# Patient Record
Sex: MALE | Race: WHITE | Employment: UNEMPLOYED
[De-identification: names, ages, dates, MRNs, and addresses within clinical notes are randomized per-mention and may not be internally consistent; named-entity substitution may affect disease eponyms.]

---

## 2017-02-03 DIAGNOSIS — M19.90 OSTEOARTHRITIS, UNSPECIFIED OSTEOARTHRITIS TYPE, UNSPECIFIED SITE: ICD-10-CM

## 2017-02-03 RX ORDER — GUAIFENESIN 600 MG/1
600 TABLET, EXTENDED RELEASE ORAL 2 TIMES DAILY PRN
Qty: 60 TABLET | Refills: 0 | Status: SHIPPED | OUTPATIENT
Start: 2017-02-03 | End: 2017-09-08 | Stop reason: SDUPTHER

## 2017-02-03 RX ORDER — AZITHROMYCIN 250 MG/1
TABLET, FILM COATED ORAL
Qty: 6 TABLET | Refills: 0 | Status: SHIPPED | OUTPATIENT
Start: 2017-02-03 | End: 2017-02-13

## 2017-02-03 RX ORDER — IBUPROFEN 800 MG/1
TABLET ORAL
Qty: 90 TABLET | Refills: 1 | Status: SHIPPED | OUTPATIENT
Start: 2017-02-03 | End: 2017-09-08 | Stop reason: SDUPTHER

## 2017-02-05 DIAGNOSIS — I10 ESSENTIAL HYPERTENSION: ICD-10-CM

## 2017-02-06 RX ORDER — LISINOPRIL 20 MG/1
TABLET ORAL
Qty: 30 TABLET | Refills: 5 | Status: SHIPPED | OUTPATIENT
Start: 2017-02-06 | End: 2017-02-28 | Stop reason: SDUPTHER

## 2017-02-28 ENCOUNTER — OFFICE VISIT (OUTPATIENT)
Dept: FAMILY MEDICINE CLINIC | Facility: CLINIC | Age: 57
End: 2017-02-28

## 2017-02-28 VITALS
BODY MASS INDEX: 32.89 KG/M2 | WEIGHT: 217 LBS | RESPIRATION RATE: 16 BRPM | HEIGHT: 68 IN | HEART RATE: 76 BPM | OXYGEN SATURATION: 90 % | SYSTOLIC BLOOD PRESSURE: 160 MMHG | DIASTOLIC BLOOD PRESSURE: 92 MMHG

## 2017-02-28 DIAGNOSIS — M62.08 DIASTASIS RECTI: ICD-10-CM

## 2017-02-28 DIAGNOSIS — K21.9 REFLUX LARYNGITIS: ICD-10-CM

## 2017-02-28 DIAGNOSIS — J44.9 CHRONIC OBSTRUCTIVE PULMONARY DISEASE, UNSPECIFIED COPD TYPE (HCC): ICD-10-CM

## 2017-02-28 DIAGNOSIS — J04.0 REFLUX LARYNGITIS: ICD-10-CM

## 2017-02-28 DIAGNOSIS — I10 ESSENTIAL HYPERTENSION: Primary | ICD-10-CM

## 2017-02-28 PROCEDURE — 99214 OFFICE O/P EST MOD 30 MIN: CPT | Performed by: FAMILY MEDICINE

## 2017-02-28 RX ORDER — LISINOPRIL 30 MG/1
30 TABLET ORAL DAILY
Qty: 30 TABLET | Refills: 5 | Status: SHIPPED | OUTPATIENT
Start: 2017-02-28 | End: 2017-06-02 | Stop reason: SDUPTHER

## 2017-02-28 ASSESSMENT — PATIENT HEALTH QUESTIONNAIRE - PHQ9
SUM OF ALL RESPONSES TO PHQ9 QUESTIONS 1 & 2: 0
1. LITTLE INTEREST OR PLEASURE IN DOING THINGS: 0
2. FEELING DOWN, DEPRESSED OR HOPELESS: 0
SUM OF ALL RESPONSES TO PHQ QUESTIONS 1-9: 0

## 2017-03-03 ENCOUNTER — OFFICE VISIT (OUTPATIENT)
Dept: PULMONOLOGY | Facility: CLINIC | Age: 57
End: 2017-03-03

## 2017-03-03 VITALS
OXYGEN SATURATION: 89 % | WEIGHT: 223.8 LBS | RESPIRATION RATE: 16 BRPM | HEIGHT: 68 IN | SYSTOLIC BLOOD PRESSURE: 155 MMHG | DIASTOLIC BLOOD PRESSURE: 102 MMHG | HEART RATE: 88 BPM | BODY MASS INDEX: 33.92 KG/M2

## 2017-03-03 DIAGNOSIS — G47.33 OSA ON CPAP: ICD-10-CM

## 2017-03-03 DIAGNOSIS — G47.33 CHRONIC INTERMITTENT HYPOXIA WITH OBSTRUCTIVE SLEEP APNEA: ICD-10-CM

## 2017-03-03 DIAGNOSIS — G47.34 CHRONIC INTERMITTENT HYPOXIA WITH OBSTRUCTIVE SLEEP APNEA: ICD-10-CM

## 2017-03-03 DIAGNOSIS — Z99.89 OSA ON CPAP: ICD-10-CM

## 2017-03-03 DIAGNOSIS — Z87.891 STOPPED SMOKING WITH GREATER THAN 40 PACK YEAR HISTORY: ICD-10-CM

## 2017-03-03 DIAGNOSIS — C32.9 LARYNGEAL CANCER (HCC): ICD-10-CM

## 2017-03-03 DIAGNOSIS — J44.9 MODERATE COPD (CHRONIC OBSTRUCTIVE PULMONARY DISEASE) (HCC): Primary | ICD-10-CM

## 2017-03-03 PROCEDURE — 99213 OFFICE O/P EST LOW 20 MIN: CPT | Performed by: INTERNAL MEDICINE

## 2017-03-03 ASSESSMENT — ENCOUNTER SYMPTOMS
BACK PAIN: 1
SHORTNESS OF BREATH: 1
WHEEZING: 1
COUGH: 1
EYES NEGATIVE: 1
APNEA: 1

## 2017-05-08 PROBLEM — K52.9 COLITIS: Status: ACTIVE | Noted: 2017-05-08

## 2017-05-24 ENCOUNTER — TELEPHONE (OUTPATIENT)
Dept: CASE MANAGEMENT | Age: 57
End: 2017-05-24

## 2017-05-30 ENCOUNTER — TELEPHONE (OUTPATIENT)
Dept: CASE MANAGEMENT | Age: 57
End: 2017-05-30

## 2017-06-02 ENCOUNTER — OFFICE VISIT (OUTPATIENT)
Dept: FAMILY MEDICINE CLINIC | Age: 57
End: 2017-06-02
Payer: COMMERCIAL

## 2017-06-02 VITALS
DIASTOLIC BLOOD PRESSURE: 94 MMHG | HEART RATE: 72 BPM | WEIGHT: 217.2 LBS | RESPIRATION RATE: 16 BRPM | SYSTOLIC BLOOD PRESSURE: 153 MMHG | BODY MASS INDEX: 33.03 KG/M2

## 2017-06-02 DIAGNOSIS — J04.0 REFLUX LARYNGITIS: ICD-10-CM

## 2017-06-02 DIAGNOSIS — I10 ESSENTIAL HYPERTENSION: Primary | ICD-10-CM

## 2017-06-02 DIAGNOSIS — K52.9 COLITIS: ICD-10-CM

## 2017-06-02 DIAGNOSIS — K21.9 REFLUX LARYNGITIS: ICD-10-CM

## 2017-06-02 DIAGNOSIS — K76.0 HEPATIC STEATOSIS: ICD-10-CM

## 2017-06-02 PROCEDURE — 99214 OFFICE O/P EST MOD 30 MIN: CPT | Performed by: FAMILY MEDICINE

## 2017-06-02 RX ORDER — LISINOPRIL 20 MG/1
20 TABLET ORAL DAILY
Qty: 30 TABLET | Refills: 2 | Status: SHIPPED | OUTPATIENT
Start: 2017-06-02 | End: 2017-09-08 | Stop reason: SDUPTHER

## 2017-06-07 ENCOUNTER — HOSPITAL ENCOUNTER (OUTPATIENT)
Dept: CT IMAGING | Age: 57
Discharge: HOME | End: 2017-06-07
Payer: COMMERCIAL

## 2017-06-07 DIAGNOSIS — Z87.891 PERSONAL HISTORY OF TOBACCO USE: ICD-10-CM

## 2017-06-07 PROCEDURE — G0297 LDCT FOR LUNG CA SCREEN: HCPCS

## 2017-06-21 ENCOUNTER — HOSPITAL ENCOUNTER (OUTPATIENT)
Dept: RADIATION ONCOLOGY | Facility: MEDICAL CENTER | Age: 57
Discharge: HOME | End: 2017-06-21
Payer: COMMERCIAL

## 2017-06-21 PROCEDURE — 99212 OFFICE O/P EST SF 10 MIN: CPT | Performed by: RADIOLOGY

## 2017-08-11 ENCOUNTER — ANESTHESIA (OUTPATIENT)
Dept: OPERATING ROOM | Facility: CLINIC | Age: 57
End: 2017-08-11
Payer: COMMERCIAL

## 2017-08-11 ENCOUNTER — HOSPITAL ENCOUNTER (OUTPATIENT)
Facility: CLINIC | Age: 57
Setting detail: OUTPATIENT SURGERY
Discharge: HOME | End: 2017-08-11
Attending: SURGERY | Admitting: SURGERY
Payer: COMMERCIAL

## 2017-08-11 ENCOUNTER — ANESTHESIA EVENT (OUTPATIENT)
Dept: OPERATING ROOM | Facility: CLINIC | Age: 57
End: 2017-08-11
Payer: COMMERCIAL

## 2017-08-11 VITALS
OXYGEN SATURATION: 91 % | HEART RATE: 60 BPM | HEIGHT: 68 IN | BODY MASS INDEX: 32.89 KG/M2 | DIASTOLIC BLOOD PRESSURE: 72 MMHG | RESPIRATION RATE: 15 BRPM | SYSTOLIC BLOOD PRESSURE: 100 MMHG | WEIGHT: 217 LBS | TEMPERATURE: 96.8 F

## 2017-08-11 VITALS
RESPIRATION RATE: 19 BRPM | DIASTOLIC BLOOD PRESSURE: 51 MMHG | OXYGEN SATURATION: 85 % | SYSTOLIC BLOOD PRESSURE: 75 MMHG

## 2017-08-11 PROBLEM — Z98.890 S/P COLONOSCOPY WITH POLYPECTOMY: Status: ACTIVE | Noted: 2017-08-11

## 2017-08-11 PROBLEM — K62.1 RECTAL POLYP: Status: ACTIVE | Noted: 2017-08-11

## 2017-08-11 PROCEDURE — 2580000003 HC RX 258: Performed by: ANESTHESIOLOGY

## 2017-08-11 PROCEDURE — 2580000003 HC RX 258: Performed by: SPECIALIST

## 2017-08-11 PROCEDURE — 3700000001 HC ADD 15 MINUTES (ANESTHESIA): Performed by: SURGERY

## 2017-08-11 PROCEDURE — 88305 TISSUE EXAM BY PATHOLOGIST: CPT

## 2017-08-11 PROCEDURE — 7100000011 HC PHASE II RECOVERY - ADDTL 15 MIN: Performed by: SURGERY

## 2017-08-11 PROCEDURE — 7100000000 HC PACU RECOVERY - FIRST 15 MIN: Performed by: SURGERY

## 2017-08-11 PROCEDURE — 3700000000 HC ANESTHESIA ATTENDED CARE: Performed by: SURGERY

## 2017-08-11 PROCEDURE — 7100000010 HC PHASE II RECOVERY - FIRST 15 MIN: Performed by: SURGERY

## 2017-08-11 PROCEDURE — 6360000002 HC RX W HCPCS: Performed by: SPECIALIST

## 2017-08-11 PROCEDURE — 6360000002 HC RX W HCPCS: Performed by: ANESTHESIOLOGY

## 2017-08-11 PROCEDURE — 3609010400 HC COLONOSCOPY POLYPECTOMY HOT BIOPSY: Performed by: SURGERY

## 2017-08-11 RX ORDER — ALBUTEROL SULFATE 2.5 MG/3ML
2.5 SOLUTION RESPIRATORY (INHALATION) EVERY 6 HOURS PRN
Status: DISCONTINUED | OUTPATIENT
Start: 2017-08-11 | End: 2017-08-11 | Stop reason: HOSPADM

## 2017-08-11 RX ORDER — PROPOFOL 10 MG/ML
INJECTION, EMULSION INTRAVENOUS CONTINUOUS PRN
Status: DISCONTINUED | OUTPATIENT
Start: 2017-08-11 | End: 2017-08-11 | Stop reason: SDUPTHER

## 2017-08-11 RX ORDER — PROPOFOL 10 MG/ML
INJECTION, EMULSION INTRAVENOUS PRN
Status: DISCONTINUED | OUTPATIENT
Start: 2017-08-11 | End: 2017-08-11 | Stop reason: SDUPTHER

## 2017-08-11 RX ORDER — SODIUM CHLORIDE, SODIUM LACTATE, POTASSIUM CHLORIDE, CALCIUM CHLORIDE 600; 310; 30; 20 MG/100ML; MG/100ML; MG/100ML; MG/100ML
INJECTION, SOLUTION INTRAVENOUS CONTINUOUS
Status: DISCONTINUED | OUTPATIENT
Start: 2017-08-11 | End: 2017-08-11 | Stop reason: HOSPADM

## 2017-08-11 RX ORDER — ALBUTEROL SULFATE 2.5 MG/3ML
2.5 SOLUTION RESPIRATORY (INHALATION) ONCE
Status: COMPLETED | OUTPATIENT
Start: 2017-08-11 | End: 2017-08-11

## 2017-08-11 RX ORDER — SODIUM CHLORIDE, SODIUM LACTATE, POTASSIUM CHLORIDE, CALCIUM CHLORIDE 600; 310; 30; 20 MG/100ML; MG/100ML; MG/100ML; MG/100ML
INJECTION, SOLUTION INTRAVENOUS CONTINUOUS PRN
Status: DISCONTINUED | OUTPATIENT
Start: 2017-08-11 | End: 2017-08-11 | Stop reason: SDUPTHER

## 2017-08-11 RX ORDER — SODIUM CHLORIDE, SODIUM LACTATE, POTASSIUM CHLORIDE, CALCIUM CHLORIDE 600; 310; 30; 20 MG/100ML; MG/100ML; MG/100ML; MG/100ML
INJECTION, SOLUTION INTRAVENOUS CONTINUOUS
Status: CANCELLED | OUTPATIENT
Start: 2017-08-11

## 2017-08-11 RX ORDER — LIDOCAINE HYDROCHLORIDE 10 MG/ML
1 INJECTION, SOLUTION EPIDURAL; INFILTRATION; INTRACAUDAL; PERINEURAL
Status: CANCELLED | OUTPATIENT
Start: 2017-08-11 | End: 2017-08-11

## 2017-08-11 RX ADMIN — SODIUM CHLORIDE, POTASSIUM CHLORIDE, SODIUM LACTATE AND CALCIUM CHLORIDE: 600; 310; 30; 20 INJECTION, SOLUTION INTRAVENOUS at 08:00

## 2017-08-11 RX ADMIN — PROPOFOL 50 MG: 10 INJECTION, EMULSION INTRAVENOUS at 08:04

## 2017-08-11 RX ADMIN — PROPOFOL 20 MG: 10 INJECTION, EMULSION INTRAVENOUS at 08:07

## 2017-08-11 RX ADMIN — SODIUM CHLORIDE, POTASSIUM CHLORIDE, SODIUM LACTATE AND CALCIUM CHLORIDE: 600; 310; 30; 20 INJECTION, SOLUTION INTRAVENOUS at 07:29

## 2017-08-11 RX ADMIN — ALBUTEROL SULFATE 2.5 MG: 2.5 SOLUTION RESPIRATORY (INHALATION) at 07:15

## 2017-08-11 RX ADMIN — PROPOFOL 120 MCG/KG/MIN: 10 INJECTION, EMULSION INTRAVENOUS at 08:04

## 2017-08-11 ASSESSMENT — COPD QUESTIONNAIRES: CAT_SEVERITY: SEVERE

## 2017-08-11 ASSESSMENT — ENCOUNTER SYMPTOMS: SHORTNESS OF BREATH: 1

## 2017-08-11 ASSESSMENT — PAIN - FUNCTIONAL ASSESSMENT: PAIN_FUNCTIONAL_ASSESSMENT: 0-10

## 2017-08-15 PROBLEM — D36.9 ADENOMATOUS POLYP: Status: ACTIVE | Noted: 2017-08-15

## 2017-08-15 LAB — SURGICAL PATHOLOGY REPORT: NORMAL

## 2017-09-08 ENCOUNTER — OFFICE VISIT (OUTPATIENT)
Dept: FAMILY MEDICINE CLINIC | Age: 57
End: 2017-09-08
Payer: COMMERCIAL

## 2017-09-08 VITALS
SYSTOLIC BLOOD PRESSURE: 136 MMHG | RESPIRATION RATE: 16 BRPM | BODY MASS INDEX: 33.45 KG/M2 | DIASTOLIC BLOOD PRESSURE: 89 MMHG | HEART RATE: 68 BPM | WEIGHT: 220 LBS

## 2017-09-08 DIAGNOSIS — E78.00 HIGH CHOLESTEROL: ICD-10-CM

## 2017-09-08 DIAGNOSIS — I10 ESSENTIAL HYPERTENSION: Primary | ICD-10-CM

## 2017-09-08 DIAGNOSIS — K21.9 REFLUX LARYNGITIS: ICD-10-CM

## 2017-09-08 DIAGNOSIS — D36.9 ADENOMATOUS POLYP: ICD-10-CM

## 2017-09-08 DIAGNOSIS — M19.90 OSTEOARTHRITIS, UNSPECIFIED OSTEOARTHRITIS TYPE, UNSPECIFIED SITE: ICD-10-CM

## 2017-09-08 DIAGNOSIS — R53.83 FATIGUE, UNSPECIFIED TYPE: ICD-10-CM

## 2017-09-08 DIAGNOSIS — J04.0 REFLUX LARYNGITIS: ICD-10-CM

## 2017-09-08 PROCEDURE — 99214 OFFICE O/P EST MOD 30 MIN: CPT | Performed by: FAMILY MEDICINE

## 2017-09-08 RX ORDER — LISINOPRIL 20 MG/1
20 TABLET ORAL DAILY
Qty: 30 TABLET | Refills: 2 | Status: SHIPPED | OUTPATIENT
Start: 2017-09-08 | End: 2017-09-14 | Stop reason: SDUPTHER

## 2017-09-08 RX ORDER — GUAIFENESIN 600 MG/1
600 TABLET, EXTENDED RELEASE ORAL 2 TIMES DAILY PRN
Qty: 60 TABLET | Refills: 0 | Status: SHIPPED | OUTPATIENT
Start: 2017-09-08 | End: 2018-07-03 | Stop reason: SDUPTHER

## 2017-09-08 RX ORDER — IBUPROFEN 800 MG/1
TABLET ORAL
Qty: 90 TABLET | Refills: 1 | Status: SHIPPED | OUTPATIENT
Start: 2017-09-08 | End: 2017-12-12 | Stop reason: SDUPTHER

## 2017-09-14 ENCOUNTER — HOSPITAL ENCOUNTER (OUTPATIENT)
Age: 57
Discharge: HOME | End: 2017-09-14
Payer: COMMERCIAL

## 2017-09-14 ENCOUNTER — TELEPHONE (OUTPATIENT)
Dept: FAMILY MEDICINE CLINIC | Age: 57
End: 2017-09-14

## 2017-09-14 DIAGNOSIS — E78.00 HIGH CHOLESTEROL: ICD-10-CM

## 2017-09-14 DIAGNOSIS — R53.83 FATIGUE, UNSPECIFIED TYPE: ICD-10-CM

## 2017-09-14 DIAGNOSIS — I10 ESSENTIAL HYPERTENSION: ICD-10-CM

## 2017-09-14 LAB
ALBUMIN SERPL-MCNC: 4.4 G/DL (ref 3.5–5.2)
ALBUMIN/GLOBULIN RATIO: ABNORMAL (ref 1–2.5)
ALP BLD-CCNC: 83 U/L (ref 40–129)
ALT SERPL-CCNC: 33 U/L (ref 5–41)
ANION GAP SERPL CALCULATED.3IONS-SCNC: 12 MMOL/L (ref 9–17)
AST SERPL-CCNC: 25 U/L
BILIRUB SERPL-MCNC: 0.82 MG/DL (ref 0.3–1.2)
BUN BLDV-MCNC: 27 MG/DL (ref 6–20)
BUN/CREAT BLD: 24 (ref 9–20)
CALCIUM SERPL-MCNC: 9.6 MG/DL (ref 8.6–10.4)
CHLORIDE BLD-SCNC: 100 MMOL/L (ref 98–107)
CHOLESTEROL/HDL RATIO: 5
CHOLESTEROL: 189 MG/DL
CO2: 30 MMOL/L (ref 20–31)
CREAT SERPL-MCNC: 1.11 MG/DL (ref 0.7–1.2)
GFR AFRICAN AMERICAN: >60 ML/MIN
GFR NON-AFRICAN AMERICAN: >60 ML/MIN
GFR SERPL CREATININE-BSD FRML MDRD: ABNORMAL ML/MIN/{1.73_M2}
GFR SERPL CREATININE-BSD FRML MDRD: ABNORMAL ML/MIN/{1.73_M2}
GLUCOSE BLD-MCNC: 115 MG/DL (ref 70–99)
HCT VFR BLD CALC: 46 % (ref 41–53)
HDLC SERPL-MCNC: 38 MG/DL
HEMOGLOBIN: 15 G/DL (ref 13.5–17.5)
LDL CHOLESTEROL: 116 MG/DL (ref 0–130)
MCH RBC QN AUTO: 26.1 PG (ref 26–34)
MCHC RBC AUTO-ENTMCNC: 32.5 G/DL (ref 31–37)
MCV RBC AUTO: 80.4 FL (ref 80–100)
PDW BLD-RTO: 15.9 % (ref 11.5–14.5)
PLATELET # BLD: 182 K/UL (ref 130–400)
PMV BLD AUTO: 8.8 FL (ref 6–12)
POTASSIUM SERPL-SCNC: 4.5 MMOL/L (ref 3.7–5.3)
RBC # BLD: 5.72 M/UL (ref 4.5–5.9)
SODIUM BLD-SCNC: 142 MMOL/L (ref 135–144)
TOTAL PROTEIN: 7.4 G/DL (ref 6.4–8.3)
TRIGL SERPL-MCNC: 174 MG/DL
TSH SERPL DL<=0.05 MIU/L-ACNC: 1.81 MIU/L (ref 0.3–5)
VLDLC SERPL CALC-MCNC: ABNORMAL MG/DL (ref 1–30)
WBC # BLD: 5.7 K/UL (ref 3.5–11)

## 2017-09-14 PROCEDURE — 84443 ASSAY THYROID STIM HORMONE: CPT

## 2017-09-14 PROCEDURE — 80053 COMPREHEN METABOLIC PANEL: CPT

## 2017-09-14 PROCEDURE — 80061 LIPID PANEL: CPT

## 2017-09-14 PROCEDURE — 36415 COLL VENOUS BLD VENIPUNCTURE: CPT

## 2017-09-14 PROCEDURE — 85027 COMPLETE CBC AUTOMATED: CPT

## 2017-09-14 RX ORDER — LISINOPRIL 30 MG/1
30 TABLET ORAL DAILY
Qty: 90 TABLET | Refills: 1 | Status: SHIPPED | OUTPATIENT
Start: 2017-09-14 | End: 2017-12-12 | Stop reason: SDUPTHER

## 2017-09-15 ENCOUNTER — OFFICE VISIT (OUTPATIENT)
Dept: PULMONOLOGY | Age: 57
End: 2017-09-15
Payer: COMMERCIAL

## 2017-09-15 ENCOUNTER — TELEPHONE (OUTPATIENT)
Dept: PULMONOLOGY | Age: 57
End: 2017-09-15

## 2017-09-15 VITALS
OXYGEN SATURATION: 91 % | HEART RATE: 97 BPM | RESPIRATION RATE: 22 BRPM | SYSTOLIC BLOOD PRESSURE: 123 MMHG | HEIGHT: 71 IN | BODY MASS INDEX: 30.38 KG/M2 | DIASTOLIC BLOOD PRESSURE: 82 MMHG | WEIGHT: 217 LBS

## 2017-09-15 DIAGNOSIS — G47.33 CHRONIC INTERMITTENT HYPOXIA WITH OBSTRUCTIVE SLEEP APNEA: ICD-10-CM

## 2017-09-15 DIAGNOSIS — G47.34 CHRONIC INTERMITTENT HYPOXIA WITH OBSTRUCTIVE SLEEP APNEA: ICD-10-CM

## 2017-09-15 DIAGNOSIS — C32.9 LARYNGEAL CANCER (HCC): ICD-10-CM

## 2017-09-15 DIAGNOSIS — J44.9 MODERATE COPD (CHRONIC OBSTRUCTIVE PULMONARY DISEASE) (HCC): Primary | ICD-10-CM

## 2017-09-15 DIAGNOSIS — G47.33 OSA ON CPAP: ICD-10-CM

## 2017-09-15 DIAGNOSIS — F17.219 NICOTINE DEPENDENCE, CIGARETTES, WITH UNSPECIFIED NICOTINE-INDUCED DISORDERS: ICD-10-CM

## 2017-09-15 DIAGNOSIS — Z87.891 STOPPED SMOKING WITH GREATER THAN 40 PACK YEAR HISTORY: ICD-10-CM

## 2017-09-15 DIAGNOSIS — Z99.89 OSA ON CPAP: ICD-10-CM

## 2017-09-15 PROCEDURE — 99213 OFFICE O/P EST LOW 20 MIN: CPT | Performed by: INTERNAL MEDICINE

## 2017-09-15 PROCEDURE — G0296 VISIT TO DETERM LDCT ELIG: HCPCS | Performed by: INTERNAL MEDICINE

## 2017-09-15 RX ORDER — PANTOPRAZOLE SODIUM 40 MG/1
40 TABLET, DELAYED RELEASE ORAL DAILY
Refills: 0 | COMMUNITY
Start: 2017-09-12 | End: 2019-08-20 | Stop reason: SDUPTHER

## 2017-09-15 RX ORDER — ACETAMINOPHEN/DIPHENHYDRAMINE 500MG-25MG
TABLET ORAL
Refills: 1 | COMMUNITY
Start: 2017-09-13 | End: 2017-12-12 | Stop reason: SDUPTHER

## 2017-09-15 ASSESSMENT — ENCOUNTER SYMPTOMS
EYES NEGATIVE: 1
SHORTNESS OF BREATH: 1

## 2017-09-18 ENCOUNTER — TELEPHONE (OUTPATIENT)
Dept: CASE MANAGEMENT | Age: 57
End: 2017-09-18

## 2017-10-16 ENCOUNTER — TELEPHONE (OUTPATIENT)
Dept: FAMILY MEDICINE CLINIC | Age: 57
End: 2017-10-16

## 2017-10-16 NOTE — TELEPHONE ENCOUNTER
The patient indicated that he would like his brother to establish care in our office. He indicates that he was recently diagnosed with cancer. I indicated that I would be happy to be his primary care physician.

## 2017-12-12 ENCOUNTER — OFFICE VISIT (OUTPATIENT)
Dept: FAMILY MEDICINE CLINIC | Age: 57
End: 2017-12-12
Payer: COMMERCIAL

## 2017-12-12 ENCOUNTER — HOSPITAL ENCOUNTER (OUTPATIENT)
Age: 57
Discharge: HOME | End: 2017-12-12
Payer: COMMERCIAL

## 2017-12-12 VITALS
WEIGHT: 224 LBS | RESPIRATION RATE: 20 BRPM | DIASTOLIC BLOOD PRESSURE: 95 MMHG | HEART RATE: 76 BPM | BODY MASS INDEX: 31.24 KG/M2 | OXYGEN SATURATION: 93 % | SYSTOLIC BLOOD PRESSURE: 168 MMHG

## 2017-12-12 DIAGNOSIS — M19.90 OSTEOARTHRITIS, UNSPECIFIED OSTEOARTHRITIS TYPE, UNSPECIFIED SITE: ICD-10-CM

## 2017-12-12 DIAGNOSIS — J04.0 REFLUX LARYNGITIS: ICD-10-CM

## 2017-12-12 DIAGNOSIS — K21.9 REFLUX LARYNGITIS: ICD-10-CM

## 2017-12-12 DIAGNOSIS — I10 ESSENTIAL HYPERTENSION: Primary | ICD-10-CM

## 2017-12-12 DIAGNOSIS — I10 ESSENTIAL HYPERTENSION: ICD-10-CM

## 2017-12-12 LAB
ANION GAP SERPL CALCULATED.3IONS-SCNC: 10 MMOL/L (ref 9–17)
BUN BLDV-MCNC: 20 MG/DL (ref 6–20)
BUN/CREAT BLD: NORMAL (ref 9–20)
CALCIUM SERPL-MCNC: 8.8 MG/DL (ref 8.6–10.4)
CHLORIDE BLD-SCNC: 102 MMOL/L (ref 98–107)
CO2: 29 MMOL/L (ref 20–31)
CREAT SERPL-MCNC: 0.99 MG/DL (ref 0.7–1.2)
GFR AFRICAN AMERICAN: >60 ML/MIN
GFR NON-AFRICAN AMERICAN: >60 ML/MIN
GFR SERPL CREATININE-BSD FRML MDRD: NORMAL ML/MIN/{1.73_M2}
GFR SERPL CREATININE-BSD FRML MDRD: NORMAL ML/MIN/{1.73_M2}
GLUCOSE BLD-MCNC: 99 MG/DL (ref 70–99)
MAGNESIUM: 2.3 MG/DL (ref 1.6–2.6)
POTASSIUM SERPL-SCNC: 4.2 MMOL/L (ref 3.7–5.3)
SODIUM BLD-SCNC: 141 MMOL/L (ref 135–144)

## 2017-12-12 PROCEDURE — 3017F COLORECTAL CA SCREEN DOC REV: CPT | Performed by: FAMILY MEDICINE

## 2017-12-12 PROCEDURE — G8417 CALC BMI ABV UP PARAM F/U: HCPCS | Performed by: FAMILY MEDICINE

## 2017-12-12 PROCEDURE — 80048 BASIC METABOLIC PNL TOTAL CA: CPT

## 2017-12-12 PROCEDURE — 36415 COLL VENOUS BLD VENIPUNCTURE: CPT

## 2017-12-12 PROCEDURE — G8598 ASA/ANTIPLAT THER USED: HCPCS | Performed by: FAMILY MEDICINE

## 2017-12-12 PROCEDURE — 1036F TOBACCO NON-USER: CPT | Performed by: FAMILY MEDICINE

## 2017-12-12 PROCEDURE — G8482 FLU IMMUNIZE ORDER/ADMIN: HCPCS | Performed by: FAMILY MEDICINE

## 2017-12-12 PROCEDURE — 99214 OFFICE O/P EST MOD 30 MIN: CPT | Performed by: FAMILY MEDICINE

## 2017-12-12 PROCEDURE — 83735 ASSAY OF MAGNESIUM: CPT

## 2017-12-12 PROCEDURE — G8427 DOCREV CUR MEDS BY ELIG CLIN: HCPCS | Performed by: FAMILY MEDICINE

## 2017-12-12 RX ORDER — IBUPROFEN 800 MG/1
TABLET ORAL
Qty: 90 TABLET | Refills: 1 | Status: SHIPPED | OUTPATIENT
Start: 2017-12-12 | End: 2018-11-25 | Stop reason: SDUPTHER

## 2017-12-12 RX ORDER — LISINOPRIL 40 MG/1
40 TABLET ORAL DAILY
Qty: 30 TABLET | Refills: 5 | Status: SHIPPED | OUTPATIENT
Start: 2017-12-12 | End: 2018-06-04 | Stop reason: SDUPTHER

## 2017-12-12 RX ORDER — ACETAMINOPHEN/DIPHENHYDRAMINE 500MG-25MG
81 TABLET ORAL DAILY
Qty: 30 TABLET | Refills: 3 | Status: SHIPPED | OUTPATIENT
Start: 2017-12-12 | End: 2018-03-15 | Stop reason: SDUPTHER

## 2017-12-12 NOTE — PROGRESS NOTES
Subjective:  Joann Miller is in for continued evaluation and management. His chronic medical problems include the following; high blood pressure, reflux laryngitis, and osteoarthritis. He has high blood pressure. She is currently on lisinopril 30 mg daily. He denies any adverse effects associated with use of the medication. He denies any chest pain or pressure. He denies any shortness of breath. He suffers from acid reflux and reflux laryngitis. He is currently on pantoprazole. His symptoms are relatively well controlled on this medication. Additionally, he has osteoarthritis. He takes ibuprofen 800 mg on an as-needed basis. Review of systems per HPI, otherwise negative. Allergies; medications; past medical, surgical, family, and social history; and problem list reviewed as indicated in this encounter. Objective:  Vitals: Blood pressure (!) 168/95, pulse 76, resp. rate 20, weight 224 lb (101.6 kg), SpO2 93 %. Constitutional: He is oriented to person, place, and time. He appears well-developed and well-nourished and in no acute distress. Cardiovascular: Normal rate and regular rhythm, no murmur, rub, or gallop    Pulmonary/Chest: Effort normal and breath sounds normal. No rales or wheezes. No chest retraction. Extremities: no clubbing, cyanosis, or edema  Neurological:  CN II - XII grossly intact; no focal neurological deficits  Psychiatric:  Well groomed, well dressed. The patient maintains appropriate eye contact and does not appear to be responding to internal stimuli. No agitation      Assessment/ Plan / Medical Decision Making  1. Essential hypertension     2. Reflux laryngitis     3. Osteoarthritis, unspecified osteoarthritis type, unspecified site  ibuprofen (ADVIL;MOTRIN) 800 MG tablet       Medications, laboratory testing, imaging, consultation, and follow up as documented in this encounter. His blood pressure is less than optimally controlled.   Increase lisinopril to 40 mg daily.  Check labs as indicated. Continue current treatment for reflux laryngitis. Continue current treatment for osteoarthritis. Follow up in our office in 3 months or as needed. Ivone Feliz received counseling on the following healthy behaviors: medication adherence    Patient given educational materials on high blood pressure. Was a self-tracking handout given in paper form or via DNS:Nethart? No  If yes, see orders or list here. Discussed use, benefit, and side effects of prescribed medications. Barriers to medication compliance addressed. All patient questions answered. Pt voiced understanding. This note is created with the assistance of a speech-recognition program.  While intending to generate a document that actually reflects the content of the visit, no guarantees can be provided that every mistake has been identified and corrected by editing.

## 2017-12-12 NOTE — PATIENT INSTRUCTIONS
Patient Education        High Blood Pressure: Care Instructions  Your Care Instructions    If your blood pressure is usually above 140/90, you have high blood pressure, or hypertension. That means the top number is 140 or higher or the bottom number is 90 or higher, or both. Despite what a lot of people think, high blood pressure usually doesn't cause headaches or make you feel dizzy or lightheaded. It usually has no symptoms. But it does increase your risk for heart attack, stroke, and kidney or eye damage. The higher your blood pressure, the more your risk increases. Your doctor will give you a goal for your blood pressure. Your goal will be based on your health and your age. An example of a goal is to keep your blood pressure below 140/90. Lifestyle changes, such as eating healthy and being active, are always important to help lower blood pressure. You might also take medicine to reach your blood pressure goal.  Follow-up care is a key part of your treatment and safety. Be sure to make and go to all appointments, and call your doctor if you are having problems. It's also a good idea to know your test results and keep a list of the medicines you take. How can you care for yourself at home? Medical treatment  · If you stop taking your medicine, your blood pressure will go back up. You may take one or more types of medicine to lower your blood pressure. Be safe with medicines. Take your medicine exactly as prescribed. Call your doctor if you think you are having a problem with your medicine. · Talk to your doctor before you start taking aspirin every day. Aspirin can help certain people lower their risk of a heart attack or stroke. But taking aspirin isn't right for everyone, because it can cause serious bleeding. · See your doctor regularly. You may need to see the doctor more often at first or until your blood pressure comes down.   · If you are taking blood pressure medicine, talk to your doctor before you take decongestants or anti-inflammatory medicine, such as ibuprofen. Some of these medicines can raise blood pressure. · Learn how to check your blood pressure at home. Lifestyle changes  · Stay at a healthy weight. This is especially important if you put on weight around the waist. Losing even 10 pounds can help you lower your blood pressure. · If your doctor recommends it, get more exercise. Walking is a good choice. Bit by bit, increase the amount you walk every day. Try for at least 30 minutes on most days of the week. You also may want to swim, bike, or do other activities. · Avoid or limit alcohol. Talk to your doctor about whether you can drink any alcohol. · Try to limit how much sodium you eat to less than 2,300 milligrams (mg) a day. Your doctor may ask you to try to eat less than 1,500 mg a day. · Eat plenty of fruits (such as bananas and oranges), vegetables, legumes, whole grains, and low-fat dairy products. · Lower the amount of saturated fat in your diet. Saturated fat is found in animal products such as milk, cheese, and meat. Limiting these foods may help you lose weight and also lower your risk for heart disease. · Do not smoke. Smoking increases your risk for heart attack and stroke. If you need help quitting, talk to your doctor about stop-smoking programs and medicines. These can increase your chances of quitting for good. When should you call for help? Call 911 anytime you think you may need emergency care. This may mean having symptoms that suggest that your blood pressure is causing a serious heart or blood vessel problem. Your blood pressure may be over 180/110. ? For example, call 911 if:  ? · You have symptoms of a heart attack. These may include:  ¨ Chest pain or pressure, or a strange feeling in the chest.  ¨ Sweating. ¨ Shortness of breath. ¨ Nausea or vomiting.   ¨ Pain, pressure, or a strange feeling in the back, neck, jaw, or upper belly or in one or both shoulders or arms.  ¨ Lightheadedness or sudden weakness. ¨ A fast or irregular heartbeat. ? · You have symptoms of a stroke. These may include:  ¨ Sudden numbness, tingling, weakness, or loss of movement in your face, arm, or leg, especially on only one side of your body. ¨ Sudden vision changes. ¨ Sudden trouble speaking. ¨ Sudden confusion or trouble understanding simple statements. ¨ Sudden problems with walking or balance. ¨ A sudden, severe headache that is different from past headaches. ? · You have severe back or belly pain. ?Do not wait until your blood pressure comes down on its own. Get help right away. ?Call your doctor now or seek immediate care if:  ? · Your blood pressure is much higher than normal (such as 180/110 or higher), but you don't have symptoms. ? · You think high blood pressure is causing symptoms, such as:  ¨ Severe headache. ¨ Blurry vision. ? Watch closely for changes in your health, and be sure to contact your doctor if:  ? · Your blood pressure measures 140/90 or higher at least 2 times. That means the top number is 140 or higher or the bottom number is 90 or higher, or both. ? · You think you may be having side effects from your blood pressure medicine. ? · Your blood pressure is usually normal, but it goes above normal at least 2 times. Where can you learn more? Go to https://MobileForce SoftwarepeBoxee.[email or web address] org and sign in to your [employer] account. Enter U263 in the MePIN [employer] box to learn more about \"High Blood Pressure: Care Instructions. \"     If you do not have an account, please click on the \"Sign Up Now\" link. Current as of: September 21, 2016  Content Version: 11.4  © 5870-6076 [employer]. Care instructions adapted under license by [facility] ([facility]).  If you have questions about a medical condition or this instruction, always ask your healthcare professional. Lauren Candelaria any warranty or liability for your use of this

## 2017-12-12 NOTE — PROGRESS NOTES
HYPERTENSION visit     BP Readings from Last 3 Encounters:   17 (!) 168/95   09/15/17 123/82   17 136/89       LDL Cholesterol (mg/dL)   Date Value   2017 116     HDL (mg/dL)   Date Value   2017 38 (L)     BUN (mg/dL)   Date Value   2017 27 (H)     CREATININE (mg/dL)   Date Value   2017 1.11     Glucose (mg/dL)   Date Value   2017 115 (H)   2012 160 (H)              Have you changed or started any medications since your last visit including any over-the-counter medicines, vitamins, or herbal medicines? no   Have you stopped taking any of your medications? Is so, why? -  no  Are you having any side effects from any of your medications? - no    Have you seen any other physician or provider since your last visit? yes - Dr. Cruzito Dash   Have you had any other diagnostic tests since your last visit? yes - labs   Have you been seen in the emergency room and/or had an admission in a hospital since we last saw you?  no   Have you had your routine dental cleaning in the past 6 months?  no     Do you have an active MyChart account? If no, what is the barrier?   No: declined    Patient Care Team:  Susan Hunt MD as PCP - General (Family Medicine)  Harley Nicole RN as   Laquita Valencia,  as Consulting Physician (Pulmonology)  Zoe Philip MD as Surgeon (Radiation Oncology)    Medical History Review  Past Medical, Family, and Social History reviewed and does contribute to the patient presenting condition    Health Maintenance   Topic Date Due    DTaP/Tdap/Td vaccine (1 - Tdap) 1977    Diabetes screen  2015    Lipid screen  2022    Colon cancer screen colonoscopy  2027    Flu vaccine  Completed    Pneumococcal med risk  Completed    Hepatitis C screen  Completed    HIV screen  Completed

## 2018-03-02 ENCOUNTER — OFFICE VISIT (OUTPATIENT)
Dept: PULMONOLOGY | Age: 58
End: 2018-03-02
Payer: COMMERCIAL

## 2018-03-02 VITALS
OXYGEN SATURATION: 93 % | HEIGHT: 68 IN | BODY MASS INDEX: 33.8 KG/M2 | HEART RATE: 76 BPM | TEMPERATURE: 97.1 F | SYSTOLIC BLOOD PRESSURE: 119 MMHG | WEIGHT: 223 LBS | RESPIRATION RATE: 16 BRPM | DIASTOLIC BLOOD PRESSURE: 71 MMHG

## 2018-03-02 DIAGNOSIS — Z99.89 OSA ON CPAP: Primary | ICD-10-CM

## 2018-03-02 DIAGNOSIS — G47.33 OSA ON CPAP: Primary | ICD-10-CM

## 2018-03-02 DIAGNOSIS — G47.34 CHRONIC INTERMITTENT HYPOXIA WITH OBSTRUCTIVE SLEEP APNEA: ICD-10-CM

## 2018-03-02 DIAGNOSIS — J44.9 CHRONIC OBSTRUCTIVE PULMONARY DISEASE, UNSPECIFIED COPD TYPE (HCC): ICD-10-CM

## 2018-03-02 DIAGNOSIS — C32.9 LARYNGEAL CANCER (HCC): ICD-10-CM

## 2018-03-02 DIAGNOSIS — G47.33 CHRONIC INTERMITTENT HYPOXIA WITH OBSTRUCTIVE SLEEP APNEA: ICD-10-CM

## 2018-03-02 PROCEDURE — G8926 SPIRO NO PERF OR DOC: HCPCS | Performed by: NURSE PRACTITIONER

## 2018-03-02 PROCEDURE — 1036F TOBACCO NON-USER: CPT | Performed by: NURSE PRACTITIONER

## 2018-03-02 PROCEDURE — G8482 FLU IMMUNIZE ORDER/ADMIN: HCPCS | Performed by: NURSE PRACTITIONER

## 2018-03-02 PROCEDURE — G8598 ASA/ANTIPLAT THER USED: HCPCS | Performed by: NURSE PRACTITIONER

## 2018-03-02 PROCEDURE — 3017F COLORECTAL CA SCREEN DOC REV: CPT | Performed by: NURSE PRACTITIONER

## 2018-03-02 PROCEDURE — 99213 OFFICE O/P EST LOW 20 MIN: CPT | Performed by: NURSE PRACTITIONER

## 2018-03-02 PROCEDURE — 3023F SPIROM DOC REV: CPT | Performed by: NURSE PRACTITIONER

## 2018-03-02 PROCEDURE — G8427 DOCREV CUR MEDS BY ELIG CLIN: HCPCS | Performed by: NURSE PRACTITIONER

## 2018-03-02 PROCEDURE — G8417 CALC BMI ABV UP PARAM F/U: HCPCS | Performed by: NURSE PRACTITIONER

## 2018-03-15 ENCOUNTER — OFFICE VISIT (OUTPATIENT)
Dept: FAMILY MEDICINE CLINIC | Age: 58
End: 2018-03-15
Payer: COMMERCIAL

## 2018-03-15 VITALS
BODY MASS INDEX: 34.12 KG/M2 | SYSTOLIC BLOOD PRESSURE: 138 MMHG | RESPIRATION RATE: 20 BRPM | HEART RATE: 77 BPM | OXYGEN SATURATION: 92 % | DIASTOLIC BLOOD PRESSURE: 88 MMHG | WEIGHT: 224.4 LBS

## 2018-03-15 DIAGNOSIS — R73.01 IMPAIRED FASTING GLUCOSE: ICD-10-CM

## 2018-03-15 DIAGNOSIS — J04.0 REFLUX LARYNGITIS: ICD-10-CM

## 2018-03-15 DIAGNOSIS — J44.9 CHRONIC OBSTRUCTIVE PULMONARY DISEASE, UNSPECIFIED COPD TYPE (HCC): ICD-10-CM

## 2018-03-15 DIAGNOSIS — K21.9 REFLUX LARYNGITIS: ICD-10-CM

## 2018-03-15 DIAGNOSIS — I10 ESSENTIAL HYPERTENSION: Primary | ICD-10-CM

## 2018-03-15 PROCEDURE — G8427 DOCREV CUR MEDS BY ELIG CLIN: HCPCS | Performed by: FAMILY MEDICINE

## 2018-03-15 PROCEDURE — 3017F COLORECTAL CA SCREEN DOC REV: CPT | Performed by: FAMILY MEDICINE

## 2018-03-15 PROCEDURE — 99214 OFFICE O/P EST MOD 30 MIN: CPT | Performed by: FAMILY MEDICINE

## 2018-03-15 PROCEDURE — 3023F SPIROM DOC REV: CPT | Performed by: FAMILY MEDICINE

## 2018-03-15 PROCEDURE — 1036F TOBACCO NON-USER: CPT | Performed by: FAMILY MEDICINE

## 2018-03-15 PROCEDURE — G8598 ASA/ANTIPLAT THER USED: HCPCS | Performed by: FAMILY MEDICINE

## 2018-03-15 PROCEDURE — G8482 FLU IMMUNIZE ORDER/ADMIN: HCPCS | Performed by: FAMILY MEDICINE

## 2018-03-15 PROCEDURE — G8926 SPIRO NO PERF OR DOC: HCPCS | Performed by: FAMILY MEDICINE

## 2018-03-15 PROCEDURE — G8417 CALC BMI ABV UP PARAM F/U: HCPCS | Performed by: FAMILY MEDICINE

## 2018-03-15 ASSESSMENT — PATIENT HEALTH QUESTIONNAIRE - PHQ9
SUM OF ALL RESPONSES TO PHQ9 QUESTIONS 1 & 2: 0
SUM OF ALL RESPONSES TO PHQ QUESTIONS 1-9: 0
1. LITTLE INTEREST OR PLEASURE IN DOING THINGS: 0
2. FEELING DOWN, DEPRESSED OR HOPELESS: 0

## 2018-03-15 NOTE — PATIENT INSTRUCTIONS
get more details on the specific medicines your doctor prescribes. When should you call for help? Watch closely for changes in your health, and be sure to contact your doctor if:  ? · You have any symptoms of diabetes. These may include:  ¨ Being thirsty more often. ¨ Urinating more. ¨ Being hungrier. ¨ Losing weight. ¨ Being very tired. ¨ Having blurry vision. ? · You have a wound that will not heal.   ? · You have an infection that will not go away. ? · You have problems with your blood pressure. ? · You want more information about diabetes and how you can keep from getting it. Where can you learn more? Go to [email or web address]://[email or web address].[email or web address] org and sign in to your [employer] account. Enter I222 in the "[employer]" box to learn more about \"Prediabetes: Care Instructions. \"     If you do not have an account, please click on the \"Sign Up Now\" link. Current as of: March 13, 2017  Content Version: 11.5  © 3866-3170 [employer]wise[employer] Inc[employer]. Care instructions adapted under license by [facility] ([facility]). If you have questions about a medical condition or this instruction, always ask your healthcare professional. [address] any warranty or liability for your use of this information.

## 2018-03-15 NOTE — PROGRESS NOTES
Subjective:  Julio Lambert is in for continued evaluation and management. His chronic medical problems include the following; high blood pressure, COPD,  reflux laryngitis, and impaired fasting glucose. He has high blood pressure. He is currently on lisinopril. He is doing well on the medication. He denies any adverse effects associated with these medications. He denies any chest pain or pressure. He denies any shortness of breath. He has COPD. He is on no medication for this at this time. Subsequent to his last visit in our office, he was evaluated by pulmonology. He denies any wheezing or shortness of breath. He has acid reflux. He is currently on pantoprazole. He is doing well on the medication. He denies any symptoms suggestive of dyspepsia. He has impaired fasting glucose. He denies any symptoms suggestive of hyper or hypoglycemia. I have recommended checking a hemoglobin A1c. Review of systems per HPI, otherwise negative. Allergies; medications; past medical, surgical, family, and social history; and problem list reviewed as indicated in this encounter. Objective:  Vitals: Blood pressure 138/88, pulse 77, resp. rate 20, weight 224 lb 6.4 oz (101.8 kg), SpO2 92 %. Constitutional: He is oriented to person, place, and time. He appears well-developed and well-nourished and in no acute distress. Cardiovascular: Normal rate and regular rhythm, no murmur, rub, or gallop    Pulmonary/Chest: Effort normal with diminished breath sounds. No rales or wheezes. No chest retraction. Extremities: no clubbing, cyanosis, or edema  Neurological:  CN II - XII grossly intact; no focal neurological deficits  Psychiatric:  Well groomed, well dressed. The patient maintains appropriate eye contact and does not appear to be responding to internal stimuli. No agitation      Assessment/ Plan / Medical Decision Making  1. Essential hypertension     2.  Chronic obstructive pulmonary disease, unspecified COPD type ([facility] [address]     3. Reflux laryngitis     4. Impaired fasting glucose         Medications, laboratory testing, imaging, consultation, and follow up as documented in this encounter. His blood pressure is relatively well controlled. Continue current treatment. Continue supplemental nocturnal oxygen and CPAP. Continue pantoprazole for reflux laryngitis. I have recommended checking a hemoglobin A1c for his impaired fasting glucose. Follow up in our office in 4 months or as needed. Shirin Salgado received counseling on the following healthy behaviors: medication adherence    Patient given educational materials on prediabetes. Was a self-tracking handout given in paper form or via [employer]? No  If yes, see orders or list here. Discussed use, benefit, and side effects of prescribed medications. Barriers to medication compliance addressed. All patient questions answered. Pt voiced understanding. This note is created with the assistance of a speech-recognition program.  While intending to generate a document that actually reflects the content of the visit, no guarantees can be provided that every mistake has been identified and corrected by editing.

## 2018-03-15 NOTE — PROGRESS NOTES
HYPERTENSION visit     BP Readings from Last 3 Encounters:   03/02/18 119/71   12/12/17 (!) 168/95   09/15/17 123/82       LDL Cholesterol (mg/dL)   Date Value   09/14/2017 116     HDL (mg/dL)   Date Value   09/14/2017 38 (L)     BUN (mg/dL)   Date Value   12/12/2017 20     CREATININE (mg/dL)   Date Value   12/12/2017 0.99     Glucose (mg/dL)   Date Value   12/12/2017 99   05/11/2012 160 (H)              Have you changed or started any medications since your last visit including any over-the-counter medicines, vitamins, or herbal medicines? no   Have you stopped taking any of your medications? Is so, why? -  no  Are you having any side effects from any of your medications? - no    Have you seen any other physician or provider since your last visit?  no   Have you had any other diagnostic tests since your last visit?  no   Have you been seen in the emergency room and/or had an admission in a hospital since we last saw you?  no   Have you had your routine dental cleaning in the past 6 months?  no     Do you have an active MyChart account? If no, what is the barrier?   No: Declined    Patient Care Team:  Taisha Robison MD as PCP - General (Family Medicine)  Rafa Palm RN as   Chacorta Lindsay DO as Consulting Physician (Pulmonology)  Jahaira Mariano MD as Surgeon (Radiation Oncology)    Medical History Review  Past Medical, Family, and Social History reviewed and does contribute to the patient presenting condition    Health Maintenance   Topic Date Due    DTaP/Tdap/Td vaccine (1 - Tdap) 02/25/1977    Shingles Vaccine (1 of 2 - 2 Dose Series) 02/25/2008    A1C test (Diabetic or Prediabetic)  11/07/2013    Potassium monitoring  12/12/2018    Creatinine monitoring  12/12/2018    Lipid screen  09/14/2022    Colon cancer screen colonoscopy  08/11/2027    Flu vaccine  Completed    Pneumococcal med risk  Completed    Hepatitis C screen  Completed    HIV screen  Completed

## 2018-03-28 ENCOUNTER — TELEPHONE (OUTPATIENT)
Dept: PULMONOLOGY | Age: 58
End: 2018-03-28

## 2018-03-28 NOTE — TELEPHONE ENCOUNTER
I received a fax from [facility] that they need records for a PA for this patients CPAP machine. All records were faxed. They are scanned into media.

## 2018-04-18 RX ORDER — ACETAMINOPHEN/DIPHENHYDRAMINE 500MG-25MG
TABLET ORAL
Qty: 30 TABLET | Refills: 1 | Status: SHIPPED | OUTPATIENT
Start: 2018-04-18 | End: 2018-06-14 | Stop reason: SDUPTHER

## 2018-05-17 ENCOUNTER — TELEPHONE (OUTPATIENT)
Dept: PRIMARY CARE CLINIC | Age: 58
End: 2018-05-17

## 2018-06-04 DIAGNOSIS — I10 ESSENTIAL HYPERTENSION: ICD-10-CM

## 2018-06-04 RX ORDER — LISINOPRIL 40 MG/1
40 TABLET ORAL DAILY
Qty: 30 TABLET | Refills: 5 | Status: SHIPPED | OUTPATIENT
Start: 2018-06-04 | End: 2018-12-04 | Stop reason: SDUPTHER

## 2018-06-14 RX ORDER — ASPIRIN 81 MG/1
81 TABLET ORAL DAILY
Qty: 30 TABLET | Refills: 3 | Status: SHIPPED | OUTPATIENT
Start: 2018-06-14 | End: 2018-10-08 | Stop reason: SDUPTHER

## 2018-07-03 NOTE — TELEPHONE ENCOUNTER
Last OV 3/15/18    Health Maintenance   Topic Date Due    DTaP/Tdap/Td vaccine (1 - Tdap) 02/25/1977    Shingles Vaccine (1 of 2 - 2 Dose Series) 02/25/2008    A1C test (Diabetic or Prediabetic)  11/07/2013    Flu vaccine (1) 09/01/2018    Potassium monitoring  12/12/2018    Creatinine monitoring  12/12/2018    Lipid screen  09/14/2022    Colon cancer screen colonoscopy  08/11/2027    Pneumococcal med risk  Completed    Hepatitis C screen  Completed    HIV screen  Completed             (applicable per patient's age: Cancer Screenings, Depression Screening, Fall Risk Screening, Immunizations)    Hemoglobin A1C (%)   Date Value   11/07/2012 5.3     LDL Cholesterol (mg/dL)   Date Value   09/14/2017 116     AST (U/L)   Date Value   09/14/2017 25     ALT (U/L)   Date Value   09/14/2017 33     BUN (mg/dL)   Date Value   12/12/2017 20      (goal A1C is < 7)   (goal LDL is <100) need 30-50% reduction from baseline     BP Readings from Last 3 Encounters:   03/15/18 138/88   03/02/18 119/71   12/12/17 (!) 168/95    (goal /80)      All Future Testing planned in [facility]:  Lab Frequency Next Occurrence   CT Lung Screening Once 09/18/2018   Hemoglobin A1C Once 04/23/2018       Next Visit Date:  Future Appointments  Date Time Provider Spike Carl   7/20/2018 9:20 AM Donta Quiñonez MD [facility]   9/14/2018 9:30 AM STA CT SCAN RM 1 [facility] CT SCAN [facility]   9/21/2018 4:00 PM Judith Harper DO [facility]            Patient Active Problem List:     Laryngeal cancer ([address]     Osteoarthritis     Heart burn     Colon polyp     COPD (chronic obstructive pulmonary disease) (HCC)     Headache     Neck pain     HTN (hypertension)     Atypical chest pain     Obstructive sleep apnea     Vocal cord polyp     Reflux laryngitis     Dysphagia     Coronary artery disease     High cholesterol     Chronic neck pain     Chronic headache     Hip arthritis     Chronic intermittent hypoxia with obstructive

## 2018-07-13 ENCOUNTER — HOSPITAL ENCOUNTER (OUTPATIENT)
Age: 58
Discharge: HOME | End: 2018-07-13
Payer: COMMERCIAL

## 2018-07-13 DIAGNOSIS — R73.01 IMPAIRED FASTING GLUCOSE: ICD-10-CM

## 2018-07-13 PROCEDURE — 36415 COLL VENOUS BLD VENIPUNCTURE: CPT

## 2018-07-13 PROCEDURE — 83036 HEMOGLOBIN GLYCOSYLATED A1C: CPT

## 2018-07-15 LAB
ESTIMATED AVERAGE GLUCOSE: 128 MG/DL
HBA1C MFR BLD: 6.1 % (ref 4–6)

## 2018-07-16 ENCOUNTER — TELEPHONE (OUTPATIENT)
Dept: ONCOLOGY | Age: 58
End: 2018-07-16

## 2018-07-20 ENCOUNTER — OFFICE VISIT (OUTPATIENT)
Dept: PRIMARY CARE CLINIC | Age: 58
End: 2018-07-20
Payer: COMMERCIAL

## 2018-07-20 VITALS
HEART RATE: 68 BPM | DIASTOLIC BLOOD PRESSURE: 88 MMHG | WEIGHT: 212 LBS | HEIGHT: 68 IN | BODY MASS INDEX: 32.13 KG/M2 | OXYGEN SATURATION: 92 % | SYSTOLIC BLOOD PRESSURE: 136 MMHG

## 2018-07-20 DIAGNOSIS — J04.0 REFLUX LARYNGITIS: ICD-10-CM

## 2018-07-20 DIAGNOSIS — I10 ESSENTIAL HYPERTENSION: ICD-10-CM

## 2018-07-20 DIAGNOSIS — R73.01 IMPAIRED FASTING GLUCOSE: Primary | ICD-10-CM

## 2018-07-20 DIAGNOSIS — K21.9 REFLUX LARYNGITIS: ICD-10-CM

## 2018-07-20 PROCEDURE — G8427 DOCREV CUR MEDS BY ELIG CLIN: HCPCS | Performed by: FAMILY MEDICINE

## 2018-07-20 PROCEDURE — 99214 OFFICE O/P EST MOD 30 MIN: CPT | Performed by: FAMILY MEDICINE

## 2018-07-20 PROCEDURE — 3017F COLORECTAL CA SCREEN DOC REV: CPT | Performed by: FAMILY MEDICINE

## 2018-07-20 PROCEDURE — G8417 CALC BMI ABV UP PARAM F/U: HCPCS | Performed by: FAMILY MEDICINE

## 2018-07-20 PROCEDURE — 1036F TOBACCO NON-USER: CPT | Performed by: FAMILY MEDICINE

## 2018-07-20 PROCEDURE — G8598 ASA/ANTIPLAT THER USED: HCPCS | Performed by: FAMILY MEDICINE

## 2018-09-21 ENCOUNTER — OFFICE VISIT (OUTPATIENT)
Dept: PULMONOLOGY | Age: 58
End: 2018-09-21
Payer: COMMERCIAL

## 2018-09-21 VITALS
RESPIRATION RATE: 14 BRPM | HEART RATE: 73 BPM | WEIGHT: 204 LBS | BODY MASS INDEX: 30.92 KG/M2 | HEIGHT: 68 IN | OXYGEN SATURATION: 90 % | SYSTOLIC BLOOD PRESSURE: 150 MMHG | DIASTOLIC BLOOD PRESSURE: 97 MMHG

## 2018-09-21 DIAGNOSIS — Z99.89 OSA ON CPAP: ICD-10-CM

## 2018-09-21 DIAGNOSIS — Z87.891 STOPPED SMOKING WITH GREATER THAN 40 PACK YEAR HISTORY: ICD-10-CM

## 2018-09-21 DIAGNOSIS — G47.33 OSA ON CPAP: ICD-10-CM

## 2018-09-21 DIAGNOSIS — J44.9 MODERATE COPD (CHRONIC OBSTRUCTIVE PULMONARY DISEASE) (HCC): Primary | ICD-10-CM

## 2018-09-21 DIAGNOSIS — C32.9 LARYNGEAL CANCER (HCC): ICD-10-CM

## 2018-09-21 PROCEDURE — 1036F TOBACCO NON-USER: CPT | Performed by: INTERNAL MEDICINE

## 2018-09-21 PROCEDURE — 99213 OFFICE O/P EST LOW 20 MIN: CPT | Performed by: INTERNAL MEDICINE

## 2018-09-21 PROCEDURE — G8926 SPIRO NO PERF OR DOC: HCPCS | Performed by: INTERNAL MEDICINE

## 2018-09-21 PROCEDURE — 3017F COLORECTAL CA SCREEN DOC REV: CPT | Performed by: INTERNAL MEDICINE

## 2018-09-21 PROCEDURE — 3023F SPIROM DOC REV: CPT | Performed by: INTERNAL MEDICINE

## 2018-09-21 PROCEDURE — G8598 ASA/ANTIPLAT THER USED: HCPCS | Performed by: INTERNAL MEDICINE

## 2018-09-21 PROCEDURE — G8427 DOCREV CUR MEDS BY ELIG CLIN: HCPCS | Performed by: INTERNAL MEDICINE

## 2018-09-21 PROCEDURE — G8417 CALC BMI ABV UP PARAM F/U: HCPCS | Performed by: INTERNAL MEDICINE

## 2018-09-21 ASSESSMENT — ENCOUNTER SYMPTOMS
EYES NEGATIVE: 1
RESPIRATORY NEGATIVE: 1

## 2018-09-22 NOTE — PROGRESS NOTES
Subjective:      Patient ID: Severiano Booker is a 59 y.o. male. HPI  Turns for follow-up of COPD and laryngeal cancer. Remains in remission. Low-dose screening CT scan of the chest done in June was negative (category 1). No new abnormalities. Remains off cigarettes. Actually on a diet. Has lost about 40 pounds. Feels great. His no bronchodilators. Very compliant with CPAP. Uses every night for the entire night. Reports refreshing and restorative sleep. Denies excessive daytime sleepiness. Believes he is benefiting greatly. Needs new mask tubing and supplies. Review of Systems   Constitutional: Negative. HENT: Negative. Eyes: Negative. Respiratory: Negative. Cardiovascular: Negative. All other systems reviewed and are negative. Objective:     Physical Exam   Constitutional: He is oriented to person, place, and time. He appears well-developed and well-nourished. HENT:   Nose: Nose normal.   Mouth/Throat: Oropharynx is clear and moist. No oropharyngeal exudate. Eyes: Conjunctivae are normal. No scleral icterus. Neck: Neck supple. No JVD present. No tracheal deviation present. No thyromegaly present. Cardiovascular: Normal rate, regular rhythm and normal heart sounds. Exam reveals no gallop. No murmur heard. Pulmonary/Chest: Effort normal. No respiratory distress. He has no wheezes. He has no rales. He exhibits no tenderness. AP diameter of chest increased. Thoracic expansion and diaphragmatic excursion diminished. BS diminished and expiratory phase prolonged. No dullness to percussion or tenderness to palpation. Abdominal: Soft. There is no tenderness. Musculoskeletal: He exhibits no edema. Lymphadenopathy:     He has no cervical adenopathy. Neurological: He is alert and oriented to person, place, and time. Skin: Skin is warm and dry. Nursing note and vitals reviewed.       Wt Readings from Last 3 Encounters:   09/21/18 204 lb (92.5 kg)   07/20/18

## 2018-10-08 RX ORDER — ACETAMINOPHEN/DIPHENHYDRAMINE 500MG-25MG
81 TABLET ORAL DAILY
Qty: 30 TABLET | Refills: 3 | Status: SHIPPED | OUTPATIENT
Start: 2018-10-08 | End: 2019-01-25 | Stop reason: SDUPTHER

## 2018-10-29 ENCOUNTER — TELEPHONE (OUTPATIENT)
Dept: PRIMARY CARE CLINIC | Age: 58
End: 2018-10-29

## 2018-10-29 NOTE — TELEPHONE ENCOUNTER
Patient called \"to get ball rolling\" for surgery clearance, having left hip by Dr Sang Lopes. States he spoke with you this morning. Surgery is not scheduled yet. Last seen in July, does he need appt?

## 2018-11-05 ENCOUNTER — TELEPHONE (OUTPATIENT)
Dept: PULMONOLOGY | Age: 58
End: 2018-11-05

## 2018-11-05 PROBLEM — E78.2 MIXED HYPERLIPIDEMIA: Status: ACTIVE | Noted: 2018-11-05

## 2018-11-06 ENCOUNTER — HOSPITAL ENCOUNTER (OUTPATIENT)
Age: 58
Discharge: HOME | End: 2018-11-06
Payer: COMMERCIAL

## 2018-11-06 DIAGNOSIS — E78.2 MIXED HYPERLIPIDEMIA: ICD-10-CM

## 2018-11-06 DIAGNOSIS — I25.10 CORONARY ARTERY DISEASE INVOLVING NATIVE CORONARY ARTERY OF NATIVE HEART WITHOUT ANGINA PECTORIS: ICD-10-CM

## 2018-11-06 LAB
ABSOLUTE EOS #: 0.2 K/UL (ref 0–0.44)
ABSOLUTE IMMATURE GRANULOCYTE: 0.03 K/UL (ref 0–0.3)
ABSOLUTE LYMPH #: 1.84 K/UL (ref 1.1–3.7)
ABSOLUTE MONO #: 0.6 K/UL (ref 0.1–1.2)
ALBUMIN SERPL-MCNC: 4.1 G/DL (ref 3.5–5.2)
ALBUMIN/GLOBULIN RATIO: 1.3 (ref 1–2.5)
ALP BLD-CCNC: 66 U/L (ref 40–129)
ALT SERPL-CCNC: 27 U/L (ref 5–41)
ANION GAP SERPL CALCULATED.3IONS-SCNC: 7 MMOL/L (ref 9–17)
AST SERPL-CCNC: 23 U/L
BASOPHILS # BLD: 1 % (ref 0–2)
BASOPHILS ABSOLUTE: 0.03 K/UL (ref 0–0.2)
BILIRUB SERPL-MCNC: 0.81 MG/DL (ref 0.3–1.2)
BUN BLDV-MCNC: 22 MG/DL (ref 8–23)
BUN/CREAT BLD: ABNORMAL (ref 9–20)
CALCIUM SERPL-MCNC: 9.8 MG/DL (ref 8.6–10.4)
CHLORIDE BLD-SCNC: 105 MMOL/L (ref 98–107)
CHOLESTEROL/HDL RATIO: 4.7
CHOLESTEROL: 196 MG/DL
CO2: 30 MMOL/L (ref 20–31)
CREAT SERPL-MCNC: 0.97 MG/DL (ref 0.7–1.2)
DIFFERENTIAL TYPE: ABNORMAL
EOSINOPHILS RELATIVE PERCENT: 4 % (ref 1–4)
GFR AFRICAN AMERICAN: >60 ML/MIN
GFR NON-AFRICAN AMERICAN: >60 ML/MIN
GFR SERPL CREATININE-BSD FRML MDRD: ABNORMAL ML/MIN/{1.73_M2}
GFR SERPL CREATININE-BSD FRML MDRD: ABNORMAL ML/MIN/{1.73_M2}
GLUCOSE BLD-MCNC: 115 MG/DL (ref 70–99)
HCT VFR BLD CALC: 49.9 % (ref 40.7–50.3)
HDLC SERPL-MCNC: 42 MG/DL
HEMOGLOBIN: 14.9 G/DL (ref 13–17)
IMMATURE GRANULOCYTES: 1 %
LDL CHOLESTEROL: 126 MG/DL (ref 0–130)
LYMPHOCYTES # BLD: 34 % (ref 24–43)
MAGNESIUM: 2.4 MG/DL (ref 1.6–2.6)
MCH RBC QN AUTO: 25.3 PG (ref 25.2–33.5)
MCHC RBC AUTO-ENTMCNC: 29.9 G/DL (ref 28.4–34.8)
MCV RBC AUTO: 84.6 FL (ref 82.6–102.9)
MONOCYTES # BLD: 11 % (ref 3–12)
NRBC AUTOMATED: 0 PER 100 WBC
PDW BLD-RTO: 17.2 % (ref 11.8–14.4)
PLATELET # BLD: 138 K/UL (ref 138–453)
PLATELET ESTIMATE: ABNORMAL
PMV BLD AUTO: 11.8 FL (ref 8.1–13.5)
POTASSIUM SERPL-SCNC: 4.8 MMOL/L (ref 3.7–5.3)
RBC # BLD: 5.9 M/UL (ref 4.21–5.77)
RBC # BLD: ABNORMAL 10*6/UL
SEG NEUTROPHILS: 49 % (ref 36–65)
SEGMENTED NEUTROPHILS ABSOLUTE COUNT: 2.79 K/UL (ref 1.5–8.1)
SODIUM BLD-SCNC: 142 MMOL/L (ref 135–144)
TOTAL PROTEIN: 7.3 G/DL (ref 6.4–8.3)
TRIGL SERPL-MCNC: 140 MG/DL
VLDLC SERPL CALC-MCNC: NORMAL MG/DL (ref 1–30)
WBC # BLD: 5.5 K/UL (ref 3.5–11.3)
WBC # BLD: ABNORMAL 10*3/UL

## 2018-11-06 PROCEDURE — 83735 ASSAY OF MAGNESIUM: CPT

## 2018-11-06 PROCEDURE — 80053 COMPREHEN METABOLIC PANEL: CPT

## 2018-11-06 PROCEDURE — 80061 LIPID PANEL: CPT

## 2018-11-06 PROCEDURE — 85025 COMPLETE CBC W/AUTO DIFF WBC: CPT

## 2018-11-06 PROCEDURE — 36415 COLL VENOUS BLD VENIPUNCTURE: CPT

## 2018-11-25 DIAGNOSIS — M19.90 OSTEOARTHRITIS, UNSPECIFIED OSTEOARTHRITIS TYPE, UNSPECIFIED SITE: ICD-10-CM

## 2018-11-26 RX ORDER — IBUPROFEN 800 MG/1
TABLET ORAL
Qty: 90 TABLET | Refills: 1 | Status: SHIPPED | OUTPATIENT
Start: 2018-11-26 | End: 2019-03-26 | Stop reason: SDUPTHER

## 2018-11-28 ENCOUNTER — TELEPHONE (OUTPATIENT)
Dept: PRIMARY CARE CLINIC | Age: 58
End: 2018-11-28

## 2018-11-28 RX ORDER — AZITHROMYCIN 250 MG/1
TABLET, FILM COATED ORAL
Qty: 6 TABLET | Refills: 0 | Status: SHIPPED | OUTPATIENT
Start: 2018-11-28 | End: 2018-12-08

## 2018-12-04 DIAGNOSIS — I10 ESSENTIAL HYPERTENSION: ICD-10-CM

## 2018-12-04 RX ORDER — LISINOPRIL 40 MG/1
TABLET ORAL
Qty: 30 TABLET | Refills: 5 | Status: SHIPPED | OUTPATIENT
Start: 2018-12-04 | End: 2019-06-28 | Stop reason: SDUPTHER

## 2018-12-04 NOTE — TELEPHONE ENCOUNTER
Last ov  11/05/18    Health Maintenance   Topic Date Due    DTaP/Tdap/Td vaccine (1 - Tdap) 02/25/1977    Shingles Vaccine (1 of 2 - 2 Dose Series) 02/25/2008    Low dose CT lung screening  06/07/2018    Flu vaccine (1) 09/01/2018    A1C test (Diabetic or Prediabetic)  07/13/2019    Potassium monitoring  11/06/2019    Creatinine monitoring  11/06/2019    Lipid screen  11/06/2023    Colon cancer screen colonoscopy  08/11/2027    Pneumococcal med risk  Completed    Hepatitis C screen  Completed    HIV screen  Completed             (applicable per patient's age: Cancer Screenings, Depression Screening, Fall Risk Screening, Immunizations)    Hemoglobin A1C (%)   Date Value   07/13/2018 6.1 (H)   11/07/2012 5.3     LDL Cholesterol (mg/dL)   Date Value   11/06/2018 126     AST (U/L)   Date Value   11/06/2018 23     ALT (U/L)   Date Value   11/06/2018 27     BUN (mg/dL)   Date Value   11/06/2018 22      (goal A1C is < 7)   (goal LDL is <100) need 30-50% reduction from baseline     BP Readings from Last 3 Encounters:   11/05/18 136/88   09/21/18 (!) 150/97   07/20/18 136/88    (goal /80)      All Future Testing planned in [facility]:  Lab Frequency Next Occurrence   CT Lung Screening Once 04/15/2019   ECHO Complete 2D W Doppler W Color Once 11/12/2018       Next Visit Date:  Future Appointments  Date Time Provider Spike Canalesisti   1/25/2019 9:40 AM Lisa Plaza MD [facility]   9/27/2019 1:30 PM DO Mouna Millan [facility]            Patient Active Problem List:     Laryngeal cancer [facility])     Osteoarthritis     Heart burn     Colon polyp     COPD (chronic obstructive pulmonary disease) ([address]     Headache     Neck pain     HTN (hypertension)     Atypical chest pain     Obstructive sleep apnea     Vocal cord polyp     Reflux laryngitis     Dysphagia     Coronary artery disease     High cholesterol     Chronic neck pain     Chronic headache     Hip arthritis     Chronic intermittent hypoxia with obstructive sleep apnea     Impaired fasting glucose     Leukoplakia of vocal cords     Colitis     Hepatic steatosis     S/P colonoscopy with polypectomy     Rectal polyp     Adenomatous polyp     Mixed hyperlipidemia

## 2019-01-03 ENCOUNTER — HOSPITAL ENCOUNTER (OUTPATIENT)
Dept: VASCULAR LAB | Age: 59
Discharge: HOME | End: 2019-01-03
Payer: COMMERCIAL

## 2019-01-03 PROCEDURE — 93971 EXTREMITY STUDY: CPT

## 2019-01-25 ENCOUNTER — OFFICE VISIT (OUTPATIENT)
Dept: PRIMARY CARE CLINIC | Age: 59
End: 2019-01-25
Payer: COMMERCIAL

## 2019-01-25 VITALS
SYSTOLIC BLOOD PRESSURE: 136 MMHG | OXYGEN SATURATION: 98 % | RESPIRATION RATE: 16 BRPM | WEIGHT: 211.5 LBS | HEART RATE: 62 BPM | BODY MASS INDEX: 32.16 KG/M2 | DIASTOLIC BLOOD PRESSURE: 82 MMHG

## 2019-01-25 DIAGNOSIS — K21.9 REFLUX LARYNGITIS: ICD-10-CM

## 2019-01-25 DIAGNOSIS — I10 ESSENTIAL HYPERTENSION: ICD-10-CM

## 2019-01-25 DIAGNOSIS — J44.9 CHRONIC OBSTRUCTIVE PULMONARY DISEASE, UNSPECIFIED COPD TYPE (HCC): Primary | ICD-10-CM

## 2019-01-25 DIAGNOSIS — J04.0 REFLUX LARYNGITIS: ICD-10-CM

## 2019-01-25 PROCEDURE — G8417 CALC BMI ABV UP PARAM F/U: HCPCS | Performed by: FAMILY MEDICINE

## 2019-01-25 PROCEDURE — G8484 FLU IMMUNIZE NO ADMIN: HCPCS | Performed by: FAMILY MEDICINE

## 2019-01-25 PROCEDURE — G8926 SPIRO NO PERF OR DOC: HCPCS | Performed by: FAMILY MEDICINE

## 2019-01-25 PROCEDURE — 1036F TOBACCO NON-USER: CPT | Performed by: FAMILY MEDICINE

## 2019-01-25 PROCEDURE — G8598 ASA/ANTIPLAT THER USED: HCPCS | Performed by: FAMILY MEDICINE

## 2019-01-25 PROCEDURE — 99214 OFFICE O/P EST MOD 30 MIN: CPT | Performed by: FAMILY MEDICINE

## 2019-01-25 PROCEDURE — 3023F SPIROM DOC REV: CPT | Performed by: FAMILY MEDICINE

## 2019-01-25 PROCEDURE — 3017F COLORECTAL CA SCREEN DOC REV: CPT | Performed by: FAMILY MEDICINE

## 2019-01-25 PROCEDURE — G8427 DOCREV CUR MEDS BY ELIG CLIN: HCPCS | Performed by: FAMILY MEDICINE

## 2019-01-25 RX ORDER — ACETAMINOPHEN/DIPHENHYDRAMINE 500MG-25MG
81 TABLET ORAL DAILY
Qty: 30 TABLET | Refills: 3 | Status: SHIPPED | OUTPATIENT
Start: 2019-01-25 | End: 2019-06-11 | Stop reason: SDUPTHER

## 2019-03-25 ENCOUNTER — TELEPHONE (OUTPATIENT)
Dept: PRIMARY CARE CLINIC | Age: 59
End: 2019-03-25

## 2019-03-25 DIAGNOSIS — S90.31XA CONTUSION OF RIGHT FOOT, INITIAL ENCOUNTER: Primary | ICD-10-CM

## 2019-03-26 ENCOUNTER — TELEPHONE (OUTPATIENT)
Dept: PULMONOLOGY | Age: 59
End: 2019-03-26

## 2019-03-26 ENCOUNTER — HOSPITAL ENCOUNTER (OUTPATIENT)
Dept: GENERAL RADIOLOGY | Age: 59
Discharge: HOME | End: 2019-03-28
Payer: COMMERCIAL

## 2019-03-26 ENCOUNTER — HOSPITAL ENCOUNTER (OUTPATIENT)
Age: 59
Discharge: HOME | End: 2019-03-28
Payer: COMMERCIAL

## 2019-03-26 DIAGNOSIS — S90.31XA CONTUSION OF RIGHT FOOT, INITIAL ENCOUNTER: ICD-10-CM

## 2019-03-26 DIAGNOSIS — M19.90 OSTEOARTHRITIS, UNSPECIFIED OSTEOARTHRITIS TYPE, UNSPECIFIED SITE: ICD-10-CM

## 2019-03-26 DIAGNOSIS — J44.9 CHRONIC OBSTRUCTIVE PULMONARY DISEASE, UNSPECIFIED COPD TYPE (HCC): Primary | ICD-10-CM

## 2019-03-26 PROCEDURE — 73630 X-RAY EXAM OF FOOT: CPT

## 2019-03-26 RX ORDER — IBUPROFEN 800 MG/1
800 TABLET ORAL EVERY 8 HOURS PRN
Qty: 90 TABLET | Refills: 1 | Status: SHIPPED | OUTPATIENT
Start: 2019-03-26 | End: 2020-04-03 | Stop reason: SDUPTHER

## 2019-04-16 ENCOUNTER — TELEPHONE (OUTPATIENT)
Dept: PRIMARY CARE CLINIC | Age: 59
End: 2019-04-16

## 2019-04-16 NOTE — TELEPHONE ENCOUNTER
Pt called and would like to speak with you. I asked him if there was anything I could help with and he stated no. Asked if I could put a short reason for call and he also stated no.

## 2019-06-12 RX ORDER — ACETAMINOPHEN/DIPHENHYDRAMINE 500MG-25MG
TABLET ORAL
Qty: 30 TABLET | Refills: 3 | Status: SHIPPED | OUTPATIENT
Start: 2019-06-12 | End: 2019-10-23 | Stop reason: SDUPTHER

## 2019-06-12 NOTE — TELEPHONE ENCOUNTER
Last OV 01/25/2019    Health Maintenance   Topic Date Due    DTaP/Tdap/Td vaccine (1 - Tdap) 02/25/1977    Shingles Vaccine (1 of 2) 02/25/2008    Low dose CT lung screening  06/07/2018    A1C test (Diabetic or Prediabetic)  07/13/2019    Potassium monitoring  11/06/2019    Creatinine monitoring  11/06/2019    Lipid screen  11/06/2023    Colon cancer screen colonoscopy  08/11/2027    Flu vaccine  Completed    Pneumococcal 0-64 years Vaccine  Completed    Hepatitis C screen  Completed    HIV screen  Completed             (applicable per patient's age: Cancer Screenings, Depression Screening, Fall Risk Screening, Immunizations)    Hemoglobin A1C (%)   Date Value   07/13/2018 6.1 (H)   11/07/2012 5.3     LDL Cholesterol (mg/dL)   Date Value   11/06/2018 126     AST (U/L)   Date Value   11/06/2018 23     ALT (U/L)   Date Value   11/06/2018 27     BUN (mg/dL)   Date Value   11/06/2018 22      (goal A1C is < 7)   (goal LDL is <100) need 30-50% reduction from baseline     BP Readings from Last 3 Encounters:   01/25/19 136/82   11/05/18 136/88   09/21/18 (!) 150/97    (goal /80)      All Future Testing planned in [facility]:  Lab Frequency Next Occurrence   CT Lung Screening Once 04/15/2019       Next Visit Date:  Future Appointments   Date Time Provider Spike Carl   7/16/2019  9:20 AM Javier Durham MD [facility]   9/27/2019  1:30 PM DO Mouna Clark [facility]            Patient Active Problem List:     Laryngeal cancer [facility])     Osteoarthritis     Heart burn     Colon polyp     COPD (chronic obstructive pulmonary disease) ([address]     Headache     Neck pain     HTN (hypertension)     Atypical chest pain     Obstructive sleep apnea     Vocal cord polyp     Reflux laryngitis     Dysphagia     Coronary artery disease     High cholesterol     Chronic neck pain     Chronic headache     Hip arthritis     Chronic intermittent hypoxia with obstructive sleep apnea     Impaired fasting glucose     Leukoplakia of vocal cords     Colitis     Hepatic steatosis     S/P colonoscopy with polypectomy     Rectal polyp     Adenomatous polyp     Mixed hyperlipidemia

## 2019-06-28 DIAGNOSIS — I10 ESSENTIAL HYPERTENSION: ICD-10-CM

## 2019-06-28 RX ORDER — LISINOPRIL 40 MG/1
40 TABLET ORAL DAILY
Qty: 30 TABLET | Refills: 1 | Status: SHIPPED | OUTPATIENT
Start: 2019-06-28 | End: 2019-08-17 | Stop reason: SDUPTHER

## 2019-06-28 NOTE — TELEPHONE ENCOUNTER
Medication: lisinoprol  Last visit: 01/25/19  Next visit: 7/19/2019  Last refill: 12/04/18  [facility]

## 2019-07-11 ENCOUNTER — HOSPITAL ENCOUNTER (OUTPATIENT)
Age: 59
Discharge: HOME | End: 2019-07-11
Payer: COMMERCIAL

## 2019-07-11 ENCOUNTER — OFFICE VISIT (OUTPATIENT)
Dept: UROLOGY | Age: 59
End: 2019-07-11
Payer: COMMERCIAL

## 2019-07-11 VITALS
HEIGHT: 68 IN | HEART RATE: 58 BPM | SYSTOLIC BLOOD PRESSURE: 160 MMHG | TEMPERATURE: 98.1 F | DIASTOLIC BLOOD PRESSURE: 90 MMHG | BODY MASS INDEX: 32.89 KG/M2 | WEIGHT: 217 LBS

## 2019-07-11 DIAGNOSIS — N40.1 HYPERTROPHY OF PROSTATE WITH URINARY OBSTRUCTION: Primary | ICD-10-CM

## 2019-07-11 DIAGNOSIS — N32.81 OAB (OVERACTIVE BLADDER): ICD-10-CM

## 2019-07-11 DIAGNOSIS — N13.8 HYPERTROPHY OF PROSTATE WITH URINARY OBSTRUCTION: Primary | ICD-10-CM

## 2019-07-11 DIAGNOSIS — N13.8 HYPERTROPHY OF PROSTATE WITH URINARY OBSTRUCTION: ICD-10-CM

## 2019-07-11 DIAGNOSIS — N39.41 URGE INCONTINENCE OF URINE: ICD-10-CM

## 2019-07-11 DIAGNOSIS — N40.1 HYPERTROPHY OF PROSTATE WITH URINARY OBSTRUCTION: ICD-10-CM

## 2019-07-11 LAB — PROSTATE SPECIFIC ANTIGEN: 1.9 UG/L

## 2019-07-11 PROCEDURE — 1036F TOBACCO NON-USER: CPT | Performed by: UROLOGY

## 2019-07-11 PROCEDURE — 99204 OFFICE O/P NEW MOD 45 MIN: CPT | Performed by: UROLOGY

## 2019-07-11 PROCEDURE — 36415 COLL VENOUS BLD VENIPUNCTURE: CPT

## 2019-07-11 PROCEDURE — G8598 ASA/ANTIPLAT THER USED: HCPCS | Performed by: UROLOGY

## 2019-07-11 PROCEDURE — G8417 CALC BMI ABV UP PARAM F/U: HCPCS | Performed by: UROLOGY

## 2019-07-11 PROCEDURE — 3017F COLORECTAL CA SCREEN DOC REV: CPT | Performed by: UROLOGY

## 2019-07-11 PROCEDURE — 84153 ASSAY OF PSA TOTAL: CPT

## 2019-07-11 PROCEDURE — G8427 DOCREV CUR MEDS BY ELIG CLIN: HCPCS | Performed by: UROLOGY

## 2019-07-11 RX ORDER — TAMSULOSIN HYDROCHLORIDE 0.4 MG/1
0.4 CAPSULE ORAL DAILY
Qty: 30 CAPSULE | Refills: 0 | Status: SHIPPED | OUTPATIENT
Start: 2019-07-11 | End: 2019-10-23 | Stop reason: SDUPTHER

## 2019-07-11 ASSESSMENT — ENCOUNTER SYMPTOMS
EYE REDNESS: 0
COLOR CHANGE: 0
SHORTNESS OF BREATH: 0
VOMITING: 0
ABDOMINAL PAIN: 0
EYE PAIN: 0
COUGH: 0
NAUSEA: 0
WHEEZING: 0
BACK PAIN: 0

## 2019-08-17 DIAGNOSIS — I10 ESSENTIAL HYPERTENSION: ICD-10-CM

## 2019-08-19 RX ORDER — LISINOPRIL 40 MG/1
TABLET ORAL
Qty: 30 TABLET | Refills: 1 | Status: SHIPPED | OUTPATIENT
Start: 2019-08-19 | End: 2020-06-04 | Stop reason: SDUPTHER

## 2019-08-19 NOTE — TELEPHONE ENCOUNTER
Last OV 01/26/2019    Health Maintenance   Topic Date Due    DTaP/Tdap/Td vaccine (1 - Tdap) 02/25/1977    Shingles Vaccine (1 of 2) 02/25/2008    Low dose CT lung screening  06/07/2018    A1C test (Diabetic or Prediabetic)  07/13/2019    Flu vaccine (1) 09/01/2019    Potassium monitoring  11/06/2019    Creatinine monitoring  11/06/2019    Lipid screen  11/06/2023    Colon cancer screen colonoscopy  08/11/2027    Pneumococcal 0-64 years Vaccine  Completed    Hepatitis C screen  Completed    HIV screen  Completed             (applicable per patient's age: Cancer Screenings, Depression Screening, Fall Risk Screening, Immunizations)    Hemoglobin A1C (%)   Date Value   07/13/2018 6.1 (H)   11/07/2012 5.3     LDL Cholesterol (mg/dL)   Date Value   11/06/2018 126     AST (U/L)   Date Value   11/06/2018 23     ALT (U/L)   Date Value   11/06/2018 27     BUN (mg/dL)   Date Value   11/06/2018 22      (goal A1C is < 7)   (goal LDL is <100) need 30-50% reduction from baseline     BP Readings from Last 3 Encounters:   07/11/19 (!) 160/90   01/25/19 136/82   11/05/18 136/88    (goal /80)      All Future Testing planned in [facility]:  Lab Frequency Next Occurrence       Next Visit Date:  Future Appointments   Date Time Provider Spike Carl   8/20/2019 10:00 AM Rosamaria Pack MD [facility]   8/29/2019 10:20 AM Danie Ansari MD [facility]   9/27/2019  1:30 PM DO Mouna Cano [facility]            Patient Active Problem List:     Laryngeal cancer ([facility]     Osteoarthritis     Heart burn     Colon polyp     COPD (chronic obstructive pulmonary disease) ([address]     Headache     Neck pain     HTN (hypertension)     Atypical chest pain     Obstructive sleep apnea     Vocal cord polyp     Reflux laryngitis     Dysphagia     Coronary artery disease     High cholesterol     Chronic neck pain     Chronic headache     Hip arthritis     Chronic intermittent hypoxia with obstructive sleep

## 2019-08-20 ENCOUNTER — OFFICE VISIT (OUTPATIENT)
Dept: PRIMARY CARE CLINIC | Age: 59
End: 2019-08-20
Payer: COMMERCIAL

## 2019-08-20 VITALS
OXYGEN SATURATION: 98 % | SYSTOLIC BLOOD PRESSURE: 150 MMHG | BODY MASS INDEX: 32.93 KG/M2 | WEIGHT: 216.6 LBS | RESPIRATION RATE: 16 BRPM | HEART RATE: 55 BPM | DIASTOLIC BLOOD PRESSURE: 84 MMHG

## 2019-08-20 DIAGNOSIS — J44.9 CHRONIC OBSTRUCTIVE PULMONARY DISEASE, UNSPECIFIED COPD TYPE (HCC): ICD-10-CM

## 2019-08-20 DIAGNOSIS — G47.33 CHRONIC INTERMITTENT HYPOXIA WITH OBSTRUCTIVE SLEEP APNEA: ICD-10-CM

## 2019-08-20 DIAGNOSIS — N40.1 BENIGN PROSTATIC HYPERPLASIA WITH WEAK URINARY STREAM: ICD-10-CM

## 2019-08-20 DIAGNOSIS — R39.12 BENIGN PROSTATIC HYPERPLASIA WITH WEAK URINARY STREAM: ICD-10-CM

## 2019-08-20 DIAGNOSIS — R73.01 IMPAIRED FASTING GLUCOSE: ICD-10-CM

## 2019-08-20 DIAGNOSIS — G47.34 CHRONIC INTERMITTENT HYPOXIA WITH OBSTRUCTIVE SLEEP APNEA: ICD-10-CM

## 2019-08-20 DIAGNOSIS — K21.9 REFLUX LARYNGITIS: ICD-10-CM

## 2019-08-20 DIAGNOSIS — Z99.89 OSA ON CPAP: ICD-10-CM

## 2019-08-20 DIAGNOSIS — I10 ESSENTIAL HYPERTENSION: ICD-10-CM

## 2019-08-20 DIAGNOSIS — G47.33 OSA ON CPAP: ICD-10-CM

## 2019-08-20 DIAGNOSIS — J04.0 REFLUX LARYNGITIS: ICD-10-CM

## 2019-08-20 DIAGNOSIS — F17.211 CIGARETTE NICOTINE DEPENDENCE IN REMISSION: ICD-10-CM

## 2019-08-20 DIAGNOSIS — Z76.89 ENCOUNTER TO ESTABLISH CARE WITH NEW DOCTOR: Primary | ICD-10-CM

## 2019-08-20 DIAGNOSIS — Z87.891 PERSONAL HISTORY OF TOBACCO USE: ICD-10-CM

## 2019-08-20 PROCEDURE — 99204 OFFICE O/P NEW MOD 45 MIN: CPT | Performed by: INTERNAL MEDICINE

## 2019-08-20 PROCEDURE — G8427 DOCREV CUR MEDS BY ELIG CLIN: HCPCS | Performed by: INTERNAL MEDICINE

## 2019-08-20 PROCEDURE — 3023F SPIROM DOC REV: CPT | Performed by: INTERNAL MEDICINE

## 2019-08-20 PROCEDURE — 1036F TOBACCO NON-USER: CPT | Performed by: INTERNAL MEDICINE

## 2019-08-20 PROCEDURE — G8926 SPIRO NO PERF OR DOC: HCPCS | Performed by: INTERNAL MEDICINE

## 2019-08-20 PROCEDURE — G8417 CALC BMI ABV UP PARAM F/U: HCPCS | Performed by: INTERNAL MEDICINE

## 2019-08-20 PROCEDURE — G0296 VISIT TO DETERM LDCT ELIG: HCPCS | Performed by: INTERNAL MEDICINE

## 2019-08-20 PROCEDURE — G8598 ASA/ANTIPLAT THER USED: HCPCS | Performed by: INTERNAL MEDICINE

## 2019-08-20 PROCEDURE — 3017F COLORECTAL CA SCREEN DOC REV: CPT | Performed by: INTERNAL MEDICINE

## 2019-08-20 RX ORDER — PANTOPRAZOLE SODIUM 40 MG/1
40 TABLET, DELAYED RELEASE ORAL DAILY
Qty: 90 TABLET | Refills: 1 | Status: SHIPPED | OUTPATIENT
Start: 2019-08-20 | End: 2019-10-23 | Stop reason: SDUPTHER

## 2019-08-20 RX ORDER — CHLORTHALIDONE 25 MG/1
25 TABLET ORAL DAILY
Qty: 30 TABLET | Refills: 0 | Status: SHIPPED | OUTPATIENT
Start: 2019-08-20 | End: 2019-09-17 | Stop reason: SDUPTHER

## 2019-08-20 ASSESSMENT — PATIENT HEALTH QUESTIONNAIRE - PHQ9
1. LITTLE INTEREST OR PLEASURE IN DOING THINGS: 0
SUM OF ALL RESPONSES TO PHQ QUESTIONS 1-9: 0
SUM OF ALL RESPONSES TO PHQ QUESTIONS 1-9: 0
SUM OF ALL RESPONSES TO PHQ9 QUESTIONS 1 & 2: 0
2. FEELING DOWN, DEPRESSED OR HOPELESS: 0

## 2019-08-20 NOTE — PROGRESS NOTES
History:   Procedure Laterality Date    CATARACT REMOVAL Bilateral     MULTIPLE MORE ON LT    COLONOSCOPY      5 Polyps removed    COLONOSCOPY  2017    with biopsy    HIP ARTHROPLASTY Right 14    LARYNGOSCOPY  10/17/2016    with biopsies    MICROLARYNGOSCOPY W BIOPSY      [place] COLSC FLX W/REMOVAL LESION BY HOT BX FORCEPS N/A 2017    COLONOSCOPY POLYPECTOMY HOT BIOPSY performed by Ken Jernigan MD at [facility] History   Problem Relation Age of Onset    Stomach Cancer Mother     Lung Cancer Mother     Lung Cancer Brother     Esophageal Cancer Brother        Social History     Tobacco Use    Smoking status: Former Smoker     Packs/day: 2.00     Years: 45.00     Pack years: 90.00     Types: Cigarettes     Start date: 10/4/1970     Last attempt to quit: 2013     Years since quittin.2    Smokeless tobacco: Never Used    Tobacco comment: quit 3 yrs ago   Substance Use Topics    Alcohol use: No     Alcohol/week: 0.0 standard drinks      Current Outpatient Medications   Medication Sig Dispense Refill    chlorthalidone (HYGROTON) 25 MG tablet Take 1 tablet by mouth daily 30 tablet 0    pantoprazole (PROTONIX) 40 MG tablet Take 1 tablet by mouth daily 90 tablet 1    lisinopril (PRINIVIL;ZESTRIL) 40 MG tablet take 1 tablet by mouth once daily 30 tablet 1    tamsulosin (FLOMAX) 0.4 MG capsule Take 1 capsule by mouth daily Take one capsule daily to facilitate passage of ureteral stone 30 capsule 0    RA ASPIRIN EC 81 MG EC tablet take 1 tablet by mouth once daily 30 tablet 3    ibuprofen (ADVIL;MOTRIN) 800 MG tablet Take 1 tablet by mouth every 8 hours as needed for Pain 90 tablet 1    MUCINEX 600 MG extended release tablet take 1 tablet by mouth twice a day if needed for congestion 60 tablet 0     No current facility-administered medications for this visit.       No Known Allergies    Health Maintenance   Topic Date Due    Shingles Vaccine (1 of 2) patients may require invasive procedures. Over diagnosis risk - 10% to 12% of screen-detected lung cancer cases are over diagnosed--that is, the cancer would not have been detected in the patient's lifetime without the screening. Need for follow up screens annually to continue lung cancer screening effectiveness     Risks associated with radiation from annual LDCT- Radiation exposure is about the same as for a mammogram, which is about 1/3 of the annual background radiation exposure from everyday life. Starting screening at age 54 is not likely to increase cancer risk from radiation exposure. Patients with comorbidities resulting in life expectancy of < 10 years, or that would preclude treatment of an abnormality identified on CT, should not be screened due to lack of benefit.     To obtain maximal benefit from this screening, smoking cessation and long-term abstinence from smoking is critical

## 2019-08-22 ASSESSMENT — ENCOUNTER SYMPTOMS
SORE THROAT: 0
VOMITING: 0
WHEEZING: 0
COUGH: 0
BACK PAIN: 0
CONSTIPATION: 0
ABDOMINAL PAIN: 0
SHORTNESS OF BREATH: 0
NAUSEA: 0
ABDOMINAL DISTENTION: 0
SINUS PRESSURE: 0

## 2019-08-26 DIAGNOSIS — I10 ESSENTIAL HYPERTENSION: ICD-10-CM

## 2019-08-26 RX ORDER — LISINOPRIL 40 MG/1
TABLET ORAL
Qty: 90 TABLET | Refills: 1 | Status: SHIPPED | OUTPATIENT
Start: 2019-08-26 | End: 2019-10-23 | Stop reason: SDUPTHER

## 2019-08-26 NOTE — TELEPHONE ENCOUNTER
Coronary artery disease     High cholesterol     Chronic neck pain     Chronic headache     Hip arthritis     Chronic intermittent hypoxia with obstructive sleep apnea     Impaired fasting glucose     Leukoplakia of vocal cords     Colitis     Hepatic steatosis     S/P colonoscopy with polypectomy     Rectal polyp     Adenomatous polyp     Mixed hyperlipidemia

## 2019-08-29 ENCOUNTER — OFFICE VISIT (OUTPATIENT)
Dept: UROLOGY | Age: 59
End: 2019-08-29
Payer: COMMERCIAL

## 2019-08-29 VITALS
SYSTOLIC BLOOD PRESSURE: 145 MMHG | BODY MASS INDEX: 33.04 KG/M2 | HEART RATE: 90 BPM | DIASTOLIC BLOOD PRESSURE: 84 MMHG | HEIGHT: 68 IN | WEIGHT: 218 LBS

## 2019-08-29 DIAGNOSIS — R35.1 BENIGN PROSTATIC HYPERPLASIA WITH NOCTURIA: Primary | ICD-10-CM

## 2019-08-29 DIAGNOSIS — N40.1 BENIGN PROSTATIC HYPERPLASIA WITH NOCTURIA: Primary | ICD-10-CM

## 2019-08-29 DIAGNOSIS — R39.15 URGENCY OF URINATION: ICD-10-CM

## 2019-08-29 PROCEDURE — G8427 DOCREV CUR MEDS BY ELIG CLIN: HCPCS | Performed by: UROLOGY

## 2019-08-29 PROCEDURE — G8417 CALC BMI ABV UP PARAM F/U: HCPCS | Performed by: UROLOGY

## 2019-08-29 PROCEDURE — 99213 OFFICE O/P EST LOW 20 MIN: CPT | Performed by: UROLOGY

## 2019-08-29 PROCEDURE — 1036F TOBACCO NON-USER: CPT | Performed by: UROLOGY

## 2019-08-29 PROCEDURE — G8598 ASA/ANTIPLAT THER USED: HCPCS | Performed by: UROLOGY

## 2019-08-29 PROCEDURE — 3017F COLORECTAL CA SCREEN DOC REV: CPT | Performed by: UROLOGY

## 2019-08-29 ASSESSMENT — ENCOUNTER SYMPTOMS
CONSTIPATION: 0
SHORTNESS OF BREATH: 0
BACK PAIN: 0
EYE PAIN: 0
VOMITING: 0
WHEEZING: 0
COUGH: 0
ABDOMINAL PAIN: 0
DIARRHEA: 0
EYE REDNESS: 0
NAUSEA: 0

## 2019-08-29 NOTE — PATIENT INSTRUCTIONS
urine s/s are better- wants to stay off Flomax     Call with worsening s/s.      AFTAB- normal     Repeat PSA and have AFTAB next year.

## 2019-09-04 ENCOUNTER — TELEPHONE (OUTPATIENT)
Dept: RADIATION ONCOLOGY | Facility: MEDICAL CENTER | Age: 59
End: 2019-09-04

## 2019-09-05 ENCOUNTER — HOSPITAL ENCOUNTER (OUTPATIENT)
Dept: CT IMAGING | Age: 59
Discharge: HOME | End: 2019-09-07
Payer: COMMERCIAL

## 2019-09-05 ENCOUNTER — HOSPITAL ENCOUNTER (OUTPATIENT)
Age: 59
Discharge: HOME | End: 2019-09-05
Payer: COMMERCIAL

## 2019-09-05 DIAGNOSIS — R73.01 IMPAIRED FASTING GLUCOSE: ICD-10-CM

## 2019-09-05 DIAGNOSIS — Z87.891 PERSONAL HISTORY OF TOBACCO USE: ICD-10-CM

## 2019-09-05 LAB
ESTIMATED AVERAGE GLUCOSE: 134 MG/DL
HBA1C MFR BLD: 6.3 % (ref 4–6)

## 2019-09-05 PROCEDURE — G0297 LDCT FOR LUNG CA SCREEN: HCPCS

## 2019-09-05 PROCEDURE — 83036 HEMOGLOBIN GLYCOSYLATED A1C: CPT

## 2019-09-05 PROCEDURE — 36415 COLL VENOUS BLD VENIPUNCTURE: CPT

## 2019-09-06 DIAGNOSIS — R73.03 PREDIABETES: ICD-10-CM

## 2019-09-06 RX ORDER — METFORMIN HYDROCHLORIDE 500 MG/1
500 TABLET, EXTENDED RELEASE ORAL
Qty: 90 TABLET | Refills: 1 | Status: SHIPPED | OUTPATIENT
Start: 2019-09-06 | End: 2019-10-23 | Stop reason: SDUPTHER

## 2019-09-10 ENCOUNTER — TELEPHONE (OUTPATIENT)
Dept: PULMONOLOGY | Age: 59
End: 2019-09-10

## 2019-09-10 DIAGNOSIS — J44.9 CHRONIC OBSTRUCTIVE PULMONARY DISEASE, UNSPECIFIED COPD TYPE (HCC): Primary | ICD-10-CM

## 2019-09-13 DIAGNOSIS — C32.9 LARYNGEAL CANCER (HCC): ICD-10-CM

## 2019-09-13 DIAGNOSIS — J44.9 CHRONIC OBSTRUCTIVE PULMONARY DISEASE, UNSPECIFIED COPD TYPE (HCC): Primary | ICD-10-CM

## 2019-09-13 PROCEDURE — 99999 PR DURABLE MEDICAL EQUIPMENT MI: CPT | Performed by: INTERNAL MEDICINE

## 2019-09-13 NOTE — TELEPHONE ENCOUNTER
I can, but generally a long form and most pts do not get approved with diagnosis of SOB/COPD. I will do DME orders.

## 2019-09-17 DIAGNOSIS — I10 ESSENTIAL HYPERTENSION: ICD-10-CM

## 2019-09-17 RX ORDER — CHLORTHALIDONE 25 MG/1
TABLET ORAL
Qty: 90 TABLET | Refills: 1 | Status: SHIPPED | OUTPATIENT
Start: 2019-09-17 | End: 2020-02-21

## 2019-09-27 ENCOUNTER — OFFICE VISIT (OUTPATIENT)
Dept: PULMONOLOGY | Age: 59
End: 2019-09-27
Payer: COMMERCIAL

## 2019-09-27 VITALS
RESPIRATION RATE: 20 BRPM | WEIGHT: 213 LBS | OXYGEN SATURATION: 96 % | BODY MASS INDEX: 32.28 KG/M2 | SYSTOLIC BLOOD PRESSURE: 135 MMHG | HEART RATE: 80 BPM | DIASTOLIC BLOOD PRESSURE: 85 MMHG | HEIGHT: 68 IN

## 2019-09-27 DIAGNOSIS — G47.33 OSA ON CPAP: ICD-10-CM

## 2019-09-27 DIAGNOSIS — J44.9 MODERATE COPD (CHRONIC OBSTRUCTIVE PULMONARY DISEASE) (HCC): Primary | ICD-10-CM

## 2019-09-27 DIAGNOSIS — Z87.891 PERSONAL HISTORY OF TOBACCO USE: ICD-10-CM

## 2019-09-27 DIAGNOSIS — Z87.891 STOPPED SMOKING WITH GREATER THAN 40 PACK YEAR HISTORY: ICD-10-CM

## 2019-09-27 DIAGNOSIS — Z99.89 OSA ON CPAP: ICD-10-CM

## 2019-09-27 DIAGNOSIS — C32.9 LARYNGEAL CANCER (HCC): ICD-10-CM

## 2019-09-27 PROCEDURE — 99213 OFFICE O/P EST LOW 20 MIN: CPT | Performed by: INTERNAL MEDICINE

## 2019-09-27 PROCEDURE — 3017F COLORECTAL CA SCREEN DOC REV: CPT | Performed by: INTERNAL MEDICINE

## 2019-09-27 PROCEDURE — G0296 VISIT TO DETERM LDCT ELIG: HCPCS | Performed by: INTERNAL MEDICINE

## 2019-09-27 PROCEDURE — 1036F TOBACCO NON-USER: CPT | Performed by: INTERNAL MEDICINE

## 2019-09-27 PROCEDURE — G8598 ASA/ANTIPLAT THER USED: HCPCS | Performed by: INTERNAL MEDICINE

## 2019-09-27 PROCEDURE — G8427 DOCREV CUR MEDS BY ELIG CLIN: HCPCS | Performed by: INTERNAL MEDICINE

## 2019-09-27 PROCEDURE — G8926 SPIRO NO PERF OR DOC: HCPCS | Performed by: INTERNAL MEDICINE

## 2019-09-27 PROCEDURE — G8417 CALC BMI ABV UP PARAM F/U: HCPCS | Performed by: INTERNAL MEDICINE

## 2019-09-27 PROCEDURE — 3023F SPIROM DOC REV: CPT | Performed by: INTERNAL MEDICINE

## 2019-09-27 ASSESSMENT — ENCOUNTER SYMPTOMS
EYES NEGATIVE: 1
BACK PAIN: 1
SHORTNESS OF BREATH: 1

## 2019-09-27 NOTE — PROGRESS NOTES
Subjective:      Patient ID: Margo Kaur is a 62 y.o. male. HPI  Patient returns for follow up of sleep apnea. Since last visit 12 months ago, patient has been very compliant with CPAP. Uses every night, for the entire night. Reports refreshing and restorative sleep. Denies snoring. Reports normal daytime alertness. Believes benefiting greatly. Compliance download from 6/21/19 to 9/18/19 shows 98% compliance for average of 9.5hrs per night. Residual AHI 1.5. Mean atadjezi22.5 cm H2O. Low-dose screening CT scan of the chest done on 9/5/2019 category 1. No suspicious nodules. Patient is asking for a motorized wheelchair. Advised the patient that his pulmonary function studies do not support the diagnosis of severe COPD. Review of Systems   Constitutional: Negative. HENT: Negative. Eyes: Negative. Respiratory: Positive for shortness of breath. Cardiovascular: Negative. Musculoskeletal: Positive for back pain and gait problem. All other systems reviewed and are negative. Objective:     Physical Exam   Constitutional: He is oriented to person, place, and time. He appears well-developed and well-nourished. HENT:   Large tongue , low hanging soft palate and large uvula. Overall pharyngeal orifice moderately decreased     Eyes: Conjunctivae are normal. No scleral icterus. Neck: Neck supple. No JVD present. No tracheal deviation present. No thyromegaly present. Cardiovascular: Normal rate, regular rhythm and normal heart sounds. Exam reveals no gallop. No murmur heard. Pulmonary/Chest: Effort normal. No respiratory distress. He has no wheezes. He has no rales. He exhibits no tenderness. Abdominal: Soft. There is no tenderness. Musculoskeletal: He exhibits no edema. Lymphadenopathy:     He has no cervical adenopathy. Neurological: He is alert and oriented to person, place, and time. Skin: Skin is warm and dry. Nursing note and vitals reviewed.       Wt Readings from this screening, smoking cessation and long-term abstinence from smoking is critical

## 2019-09-27 NOTE — PATIENT INSTRUCTIONS
What is lung cancer screening? Lung cancer screening is a way in which doctors check the lungs for early signs of cancer in people who have no symptoms of lung cancer. A low-dose CT scan uses much less radiation than a normal CT scan and shows a more detailed image of the lungs than a standard X-ray. The goal of lung cancer screening is to find cancer early, before it has a chance to grow, spread, or cause problems. One large study found that smokers who were screened with low-dose CT scans were less likely to die of lung cancer than those who were screened with standard X-ray. Below is a summary of the things you need to know regarding screening for lung cancer with low-dose computed tomography (LDCT). This is a screening program that involves routine annual screening with LDCT studies of the lung. The LDCTs are done using low-dose radiation that is not thought to increase your cancer risk. If you have other serious medical conditions (other cancers, congestive heart failure) that limit your life expectancy to less than 10 years, you should not undergo lung cancer screening with LDCT. The chance is 20%-60% that the LDCT result will show abnormalities. This would require additional testing which could include repeat imaging or even invasive procedures. Most (about 95%) of \"abnormal\" LDCT results are false in the sense that no lung cancer is ultimately found. Additionally, some (about 10%) of the cancers found would not affect your life expectancy, even if undetected and untreated. If you are still smoking, the single most important thing that you can do to reduce your risk of dying of lung cancer is to quit. For this screening to be covered by Medicare and most other insurers, strict criteria must be met.   If you do not meet these criteria, but still wish to undergo LDCT testing, you will be required to sign a waiver indicating your willingness to pay for the scan.    9/27/19 - Faxed DME order to

## 2019-09-30 ENCOUNTER — TELEPHONE (OUTPATIENT)
Dept: PRIMARY CARE CLINIC | Age: 59
End: 2019-09-30

## 2019-10-23 ENCOUNTER — OFFICE VISIT (OUTPATIENT)
Dept: PRIMARY CARE CLINIC | Age: 59
End: 2019-10-23
Payer: COMMERCIAL

## 2019-10-23 ENCOUNTER — TELEPHONE (OUTPATIENT)
Dept: PULMONOLOGY | Age: 59
End: 2019-10-23

## 2019-10-23 VITALS
WEIGHT: 222.8 LBS | BODY MASS INDEX: 33.88 KG/M2 | HEART RATE: 73 BPM | DIASTOLIC BLOOD PRESSURE: 88 MMHG | RESPIRATION RATE: 16 BRPM | OXYGEN SATURATION: 94 % | SYSTOLIC BLOOD PRESSURE: 138 MMHG

## 2019-10-23 DIAGNOSIS — R39.12 BENIGN PROSTATIC HYPERPLASIA WITH WEAK URINARY STREAM: ICD-10-CM

## 2019-10-23 DIAGNOSIS — J04.0 REFLUX LARYNGITIS: ICD-10-CM

## 2019-10-23 DIAGNOSIS — R73.03 PREDIABETES: ICD-10-CM

## 2019-10-23 DIAGNOSIS — K21.9 REFLUX LARYNGITIS: ICD-10-CM

## 2019-10-23 DIAGNOSIS — N40.1 BENIGN PROSTATIC HYPERPLASIA WITH WEAK URINARY STREAM: ICD-10-CM

## 2019-10-23 DIAGNOSIS — G47.33 OBSTRUCTIVE SLEEP APNEA: Primary | ICD-10-CM

## 2019-10-23 DIAGNOSIS — M19.90 OSTEOARTHRITIS, UNSPECIFIED OSTEOARTHRITIS TYPE, UNSPECIFIED SITE: ICD-10-CM

## 2019-10-23 DIAGNOSIS — I25.10 CORONARY ARTERY DISEASE INVOLVING NATIVE CORONARY ARTERY OF NATIVE HEART WITHOUT ANGINA PECTORIS: ICD-10-CM

## 2019-10-23 DIAGNOSIS — I10 ESSENTIAL HYPERTENSION: ICD-10-CM

## 2019-10-23 PROCEDURE — 1036F TOBACCO NON-USER: CPT | Performed by: INTERNAL MEDICINE

## 2019-10-23 PROCEDURE — G8484 FLU IMMUNIZE NO ADMIN: HCPCS | Performed by: INTERNAL MEDICINE

## 2019-10-23 PROCEDURE — G8598 ASA/ANTIPLAT THER USED: HCPCS | Performed by: INTERNAL MEDICINE

## 2019-10-23 PROCEDURE — 99214 OFFICE O/P EST MOD 30 MIN: CPT | Performed by: INTERNAL MEDICINE

## 2019-10-23 PROCEDURE — G8417 CALC BMI ABV UP PARAM F/U: HCPCS | Performed by: INTERNAL MEDICINE

## 2019-10-23 PROCEDURE — G8427 DOCREV CUR MEDS BY ELIG CLIN: HCPCS | Performed by: INTERNAL MEDICINE

## 2019-10-23 PROCEDURE — 3017F COLORECTAL CA SCREEN DOC REV: CPT | Performed by: INTERNAL MEDICINE

## 2019-10-23 RX ORDER — TAMSULOSIN HYDROCHLORIDE 0.4 MG/1
0.4 CAPSULE ORAL DAILY
Qty: 90 CAPSULE | Refills: 1 | Status: SHIPPED | OUTPATIENT
Start: 2019-10-23 | End: 2020-03-23

## 2019-10-23 RX ORDER — LISINOPRIL 40 MG/1
TABLET ORAL
Qty: 90 TABLET | Refills: 1 | Status: SHIPPED | OUTPATIENT
Start: 2019-10-23 | End: 2020-10-06

## 2019-10-23 RX ORDER — METFORMIN HYDROCHLORIDE 500 MG/1
500 TABLET, EXTENDED RELEASE ORAL
Qty: 90 TABLET | Refills: 1 | Status: SHIPPED | OUTPATIENT
Start: 2019-10-23 | End: 2020-03-30

## 2019-10-23 RX ORDER — PANTOPRAZOLE SODIUM 40 MG/1
40 TABLET, DELAYED RELEASE ORAL DAILY
Qty: 90 TABLET | Refills: 1 | Status: SHIPPED | OUTPATIENT
Start: 2019-10-23 | End: 2020-08-04

## 2019-10-23 RX ORDER — ASPIRIN 81 MG/1
TABLET ORAL
Qty: 180 TABLET | Refills: 1 | Status: SHIPPED | OUTPATIENT
Start: 2019-10-23 | End: 2020-10-01

## 2019-10-27 ASSESSMENT — ENCOUNTER SYMPTOMS
SINUS PRESSURE: 0
ABDOMINAL PAIN: 0
COUGH: 0
CONSTIPATION: 0
VOMITING: 0
ABDOMINAL DISTENTION: 0
SHORTNESS OF BREATH: 0
WHEEZING: 0
BACK PAIN: 0
NAUSEA: 0
SORE THROAT: 0

## 2019-11-26 ENCOUNTER — TELEPHONE (OUTPATIENT)
Dept: PULMONOLOGY | Age: 59
End: 2019-11-26

## 2020-02-13 ENCOUNTER — TELEPHONE (OUTPATIENT)
Dept: PULMONOLOGY | Age: 60
End: 2020-02-13

## 2020-02-13 NOTE — TELEPHONE ENCOUNTER
Patient needs a renewal for his handicap placard.  I put the order in, please sign ((while in office so it prints here))    Thank you

## 2020-02-18 ENCOUNTER — TELEPHONE (OUTPATIENT)
Dept: PRIMARY CARE CLINIC | Age: 60
End: 2020-02-18

## 2020-02-18 RX ORDER — AZITHROMYCIN 250 MG/1
TABLET, FILM COATED ORAL
Qty: 6 TABLET | Refills: 0 | Status: SHIPPED | OUTPATIENT
Start: 2020-02-18 | End: 2020-02-28

## 2020-02-21 RX ORDER — CHLORTHALIDONE 25 MG/1
TABLET ORAL
Qty: 90 TABLET | Refills: 1 | Status: SHIPPED | OUTPATIENT
Start: 2020-02-21 | End: 2020-03-16

## 2020-03-16 RX ORDER — CHLORTHALIDONE 25 MG/1
TABLET ORAL
Qty: 90 TABLET | Refills: 1 | Status: SHIPPED | OUTPATIENT
Start: 2020-03-16 | End: 2020-06-04

## 2020-03-16 NOTE — TELEPHONE ENCOUNTER
(hypertension)     Atypical chest pain     Obstructive sleep apnea     Vocal cord polyp     Reflux laryngitis     Dysphagia     Coronary artery disease     High cholesterol     Chronic neck pain     Chronic headache     Hip arthritis     Chronic intermittent hypoxia with obstructive sleep apnea     Impaired fasting glucose     Leukoplakia of vocal cords     Colitis     Hepatic steatosis     S/P colonoscopy with polypectomy     Rectal polyp     Adenomatous polyp     Mixed hyperlipidemia     Prediabetes

## 2020-03-17 ENCOUNTER — TELEPHONE (OUTPATIENT)
Dept: PRIMARY CARE CLINIC | Age: 60
End: 2020-03-17

## 2020-03-17 RX ORDER — AZITHROMYCIN 250 MG/1
TABLET, FILM COATED ORAL
Qty: 6 TABLET | Refills: 0 | Status: SHIPPED | OUTPATIENT
Start: 2020-03-17 | End: 2021-11-17 | Stop reason: SDUPTHER

## 2020-03-23 RX ORDER — TAMSULOSIN HYDROCHLORIDE 0.4 MG/1
CAPSULE ORAL
Qty: 90 CAPSULE | Refills: 1 | Status: SHIPPED | OUTPATIENT
Start: 2020-03-23 | End: 2020-04-13

## 2020-03-30 RX ORDER — METFORMIN HYDROCHLORIDE 500 MG/1
500 TABLET, EXTENDED RELEASE ORAL
Qty: 90 TABLET | Refills: 1 | Status: SHIPPED | OUTPATIENT
Start: 2020-03-30 | End: 2020-04-21

## 2020-03-30 NOTE — TELEPHONE ENCOUNTER
LOV 10/23/2019    Health Maintenance   Topic Date Due    Shingles Vaccine (1 of 2) 02/25/2008    Potassium monitoring  11/06/2019    Creatinine monitoring  11/06/2019    DTaP/Tdap/Td vaccine (1 - Tdap) 08/20/2020 (Originally 2/25/1977)    A1C test (Diabetic or Prediabetic)  09/05/2020    Low dose CT lung screening  09/05/2020    Lipid screen  11/06/2023    Colon cancer screen colonoscopy  08/11/2027    Flu vaccine  Completed    Pneumococcal 0-64 years Vaccine  Completed    Hepatitis C screen  Completed    HIV screen  Completed    Hepatitis A vaccine  Aged Out    Hepatitis B vaccine  Aged Out    Hib vaccine  Aged Out    Meningococcal (ACWY) vaccine  Aged Out             (applicable per patient's age: Cancer Screenings, Depression Screening, Fall Risk Screening, Immunizations)    Hemoglobin A1C (%)   Date Value   09/05/2019 6.3 (H)   07/13/2018 6.1 (H)   11/07/2012 5.3     LDL Cholesterol (mg/dL)   Date Value   11/06/2018 126     AST (U/L)   Date Value   11/06/2018 23     ALT (U/L)   Date Value   11/06/2018 27     BUN (mg/dL)   Date Value   11/06/2018 22      (goal A1C is < 7)   (goal LDL is <100) need 30-50% reduction from baseline     BP Readings from Last 3 Encounters:   10/23/19 138/88   09/27/19 135/85   08/29/19 (!) 145/84    (goal /80)      All Future Testing planned in [facility]:  Lab Frequency Next Occurrence   PSA, Diagnostic Once 08/20/2020   CT LUNG SCREENING Once 09/27/2020   CT Lung Screen (Annual) Once 09/10/2020       Next Visit Date:  Future Appointments   Date Time Provider Spike Carl   4/8/2020 10:20 AM Adam Rubi MD [facility]   9/7/2020  8:30 AM STA CT SCAN RM 1 STAZ CT SCAN [facility]   9/25/2020  1:30 PM DO oMuna Shah [facility]            Patient Active Problem List:     Laryngeal cancer ([facility] [address]     Osteoarthritis     Heart burn     Colon polyp     COPD (chronic obstructive pulmonary disease) ([facility] [address]     Headache     Neck pain     HTN (hypertension)     Atypical chest pain     Obstructive sleep apnea     Vocal cord polyp     Reflux laryngitis     Dysphagia     Coronary artery disease     High cholesterol     Chronic neck pain     Chronic headache     Hip arthritis     Chronic intermittent hypoxia with obstructive sleep apnea     Impaired fasting glucose     Leukoplakia of vocal cords     Colitis     Hepatic steatosis     S/P colonoscopy with polypectomy     Rectal polyp     Adenomatous polyp     Mixed hyperlipidemia     Prediabetes

## 2020-04-03 RX ORDER — IBUPROFEN 800 MG/1
800 TABLET ORAL EVERY 8 HOURS PRN
Qty: 90 TABLET | Refills: 1 | Status: SHIPPED | OUTPATIENT
Start: 2020-04-03 | End: 2020-06-04 | Stop reason: SDUPTHER

## 2020-04-07 ENCOUNTER — TELEPHONE (OUTPATIENT)
Dept: PULMONOLOGY | Age: 60
End: 2020-04-07

## 2020-04-13 RX ORDER — TAMSULOSIN HYDROCHLORIDE 0.4 MG/1
CAPSULE ORAL
Qty: 90 CAPSULE | Refills: 1 | Status: SHIPPED | OUTPATIENT
Start: 2020-04-13 | End: 2020-07-31 | Stop reason: SDUPTHER

## 2020-04-14 ENCOUNTER — OFFICE VISIT (OUTPATIENT)
Dept: PULMONOLOGY | Age: 60
End: 2020-04-14
Payer: COMMERCIAL

## 2020-04-14 VITALS
BODY MASS INDEX: 32.43 KG/M2 | WEIGHT: 214 LBS | DIASTOLIC BLOOD PRESSURE: 105 MMHG | OXYGEN SATURATION: 92 % | SYSTOLIC BLOOD PRESSURE: 160 MMHG | HEIGHT: 68 IN | HEART RATE: 80 BPM | RESPIRATION RATE: 20 BRPM

## 2020-04-14 PROCEDURE — 99213 OFFICE O/P EST LOW 20 MIN: CPT | Performed by: NURSE PRACTITIONER

## 2020-04-14 PROCEDURE — 94618 PULMONARY STRESS TESTING: CPT | Performed by: INTERNAL MEDICINE

## 2020-04-14 PROCEDURE — 3017F COLORECTAL CA SCREEN DOC REV: CPT | Performed by: NURSE PRACTITIONER

## 2020-04-14 PROCEDURE — 3023F SPIROM DOC REV: CPT | Performed by: NURSE PRACTITIONER

## 2020-04-14 PROCEDURE — G8417 CALC BMI ABV UP PARAM F/U: HCPCS | Performed by: NURSE PRACTITIONER

## 2020-04-14 PROCEDURE — G8926 SPIRO NO PERF OR DOC: HCPCS | Performed by: NURSE PRACTITIONER

## 2020-04-14 PROCEDURE — G8427 DOCREV CUR MEDS BY ELIG CLIN: HCPCS | Performed by: NURSE PRACTITIONER

## 2020-04-14 PROCEDURE — 1036F TOBACCO NON-USER: CPT | Performed by: NURSE PRACTITIONER

## 2020-04-14 ASSESSMENT — ENCOUNTER SYMPTOMS
ALLERGIC/IMMUNOLOGIC NEGATIVE: 1
GASTROINTESTINAL NEGATIVE: 1
EYES NEGATIVE: 1

## 2020-04-14 NOTE — PROGRESS NOTES
Subjective:      Patient ID: Rachel Andersen is a 56 y.o. male. HPI    Patient is here for follow-up for sleep apnea, COPD and history of laryngeal cancer. He completed a 6-minute walk test today to requalify for O2 as he is transitioning to [facility] for his future DME needs. Patient was last seen in the office in September 2019 per Dr. Norma Walls. CT lung screening completed 9/5/2019: Negative for discrete nodule or acute processes. Category 1 repeat CT imaging in 1 year, will be due in September 2020. Patient quit smoking 7 years ago with a 90-pack-year history. 6-minute walk test was completed in office today: The patient walked for 6 minutes at a normal pace. He walked for 100 ft. on room air and his oxygen saturation 76%. He was started an oxygen at flow of 2 with a conserving device. He was titrated to a flow of 3 pulse dose of oxygen to keep his saturation above 90%. He  did not exhibit signs of dyspnea and did not complain. Patient has been using and benefiting from his oxygen. He is on portable oxygen concentrator as well as stationary oxygen concentrator bleed and for his CPAP. Last PFT 8/25/2015: FVC 2.61 (66% predicted 3.96), FEV1 was 1.62 (56% predicted 2.89), FEV1/FVC 61.89 (85% predicted 73.05) RV 3.44 (174% predicted 1.98), TLC 6.35 (109% predicted 5.94) Dsb 19.34 (74% predicted 26.09) Moderate COPD no significant bronchospastic component, mild decreased in diffusion capacity. Last compliance report avail for review looked at 6/21/2019 thru 9/18/2019 and noted 88/90 days compliant with 87 days for more than 4 hours on auto titrating CPAP 14-20 cmH20 with average use of 9 hours and 23 min. AHI 1.5    Medications:  No pulmonary medications.     Vaccines:   Influenza: 8/26/2019  PNA 23: 11/6/2013  Prevnar 13: 9/26/2016    Sleep Medicine 8/23/2016   Sitting and reading 0   Watching TV 0   Sitting, inactive in a public place (e.g. a theatre or a meeting) 0   As a passenger in a car for an hour without a break 0   Lying down to rest in the afternoon when circumstances permit 0   Sitting and talking to someone 0   Sitting quietly after a lunch without alcohol 0   In a car, while stopped for a few minutes in traffic 0   Total score 0       BP (!) 160/105 (Site: Right Lower Arm)   Pulse 80   Resp 20   Ht 5' 8\" (1.727 m)   Wt 214 lb (97.1 kg)   SpO2 92% Comment: Room Air at rest  BMI 32.54 kg/m²     Past Medical History:   Diagnosis Date    Chronic intermittent hypoxia with obstructive sleep apnea     COPD [facility] obstructive [facility] disease) ([facility] [address] [facility]     Full dentures     Upper & Lower    Heart burn     GERD    Hyperlipidemia     Hypertension     On Meds    Larynx cancer ([facility] [address] 2007    On Rad.  Tx.    Migraines     On home oxygen therapy 2013    2L/NC nightly    Osteoarthritis     rt. hip pain    Sleep apnea 2013    CPAP with Oxygen nightly    Wears eyeglasses      Past Surgical History:   Procedure Laterality Date    CATARACT REMOVAL Bilateral     MULTIPLE MORE ON LT    COLONOSCOPY  2011    5 Polyps removed    COLONOSCOPY  08/11/2017    with biopsy    HIP ARTHROPLASTY Right 11/7/14    LARYNGOSCOPY  10/17/2016    with biopsies    MICROLARYNGOSCOPY W BIOPSY      [place] COLSC FLX W/REMOVAL LESION BY HOT BX FORCEPS N/A 8/11/2017    COLONOSCOPY POLYPECTOMY HOT BIOPSY performed by Ivy Tucker MD at [address] [place] [address]        Family History   Problem Relation Age of Onset    Stomach Cancer Mother     Lung Cancer Mother     Lung Cancer Brother     Esophageal Cancer Brother        Social History     Socioeconomic History    Marital status: Single     Spouse name: Not on file    Number of children: 2    Years of education: Not on file    Highest education level: Not on file   Occupational History    Occupation: SSI   Social Needs    Financial resource strain: Not on file    Food insecurity     Worry: Not on file     Inability: Not on file    Transportation needs     Medical: Not on

## 2020-04-21 RX ORDER — METFORMIN HYDROCHLORIDE 500 MG/1
500 TABLET, EXTENDED RELEASE ORAL
Qty: 90 TABLET | Refills: 1 | Status: SHIPPED | OUTPATIENT
Start: 2020-04-21 | End: 2020-06-04

## 2020-06-04 ENCOUNTER — OFFICE VISIT (OUTPATIENT)
Dept: PRIMARY CARE CLINIC | Age: 60
End: 2020-06-04
Payer: COMMERCIAL

## 2020-06-04 VITALS
BODY MASS INDEX: 32.72 KG/M2 | WEIGHT: 215.2 LBS | DIASTOLIC BLOOD PRESSURE: 72 MMHG | HEART RATE: 70 BPM | RESPIRATION RATE: 16 BRPM | OXYGEN SATURATION: 96 % | TEMPERATURE: 97.4 F | SYSTOLIC BLOOD PRESSURE: 132 MMHG

## 2020-06-04 PROCEDURE — 3023F SPIROM DOC REV: CPT | Performed by: INTERNAL MEDICINE

## 2020-06-04 PROCEDURE — G8427 DOCREV CUR MEDS BY ELIG CLIN: HCPCS | Performed by: INTERNAL MEDICINE

## 2020-06-04 PROCEDURE — 99214 OFFICE O/P EST MOD 30 MIN: CPT | Performed by: INTERNAL MEDICINE

## 2020-06-04 PROCEDURE — 1036F TOBACCO NON-USER: CPT | Performed by: INTERNAL MEDICINE

## 2020-06-04 PROCEDURE — G8417 CALC BMI ABV UP PARAM F/U: HCPCS | Performed by: INTERNAL MEDICINE

## 2020-06-04 PROCEDURE — G8926 SPIRO NO PERF OR DOC: HCPCS | Performed by: INTERNAL MEDICINE

## 2020-06-04 PROCEDURE — 3017F COLORECTAL CA SCREEN DOC REV: CPT | Performed by: INTERNAL MEDICINE

## 2020-06-04 RX ORDER — GUAIFENESIN 600 MG/1
TABLET, EXTENDED RELEASE ORAL
Qty: 60 TABLET | Refills: 0 | Status: SHIPPED | OUTPATIENT
Start: 2020-06-04

## 2020-06-04 RX ORDER — IBUPROFEN 800 MG/1
800 TABLET ORAL EVERY 8 HOURS PRN
Qty: 90 TABLET | Refills: 1 | Status: SHIPPED | OUTPATIENT
Start: 2020-06-04 | End: 2020-12-11 | Stop reason: ALTCHOICE

## 2020-06-04 ASSESSMENT — PATIENT HEALTH QUESTIONNAIRE - PHQ9
2. FEELING DOWN, DEPRESSED OR HOPELESS: 0
SUM OF ALL RESPONSES TO PHQ9 QUESTIONS 1 & 2: 0
1. LITTLE INTEREST OR PLEASURE IN DOING THINGS: 0
SUM OF ALL RESPONSES TO PHQ QUESTIONS 1-9: 0
SUM OF ALL RESPONSES TO PHQ QUESTIONS 1-9: 0

## 2020-06-07 ASSESSMENT — ENCOUNTER SYMPTOMS
CONSTIPATION: 0
SORE THROAT: 0
VOMITING: 0
ABDOMINAL DISTENTION: 0
ABDOMINAL PAIN: 0
WHEEZING: 0
SINUS PRESSURE: 0
COUGH: 0
NAUSEA: 0
SHORTNESS OF BREATH: 0
BACK PAIN: 0

## 2020-06-07 NOTE — PROGRESS NOTES
with biopsies    MICROLARYNGOSCOPY W BIOPSY      [place] COLSC FLX W/REMOVAL LESION BY HOT BX FORCEPS N/A 2017    COLONOSCOPY POLYPECTOMY HOT BIOPSY performed by Denny Officer, MD at [address] [place] [address] History   Problem Relation Age of Onset    Stomach Cancer Mother     Lung Cancer Mother     Lung Cancer Brother     Esophageal Cancer Brother        Social History     Tobacco Use    Smoking status: Former Smoker     Packs/day: 2.00     Years: 45.00     Pack years: 90.00     Types: Cigarettes     Start date: 10/4/1970     Last attempt to quit: 2013     Years since quittin.0    Smokeless tobacco: Never Used    Tobacco comment: quit 3 yrs ago   Substance Use Topics    Alcohol use: No     Alcohol/week: 0.0 standard drinks      Current Outpatient Medications   Medication Sig Dispense Refill    guaiFENesin (MUCINEX) 600 MG extended release tablet take 1 tablet by mouth twice a day if needed for congestion 60 tablet 0    ibuprofen (ADVIL;MOTRIN) 800 MG tablet Take 1 tablet by mouth every 8 hours as needed for Pain 90 tablet 1    Handicap Placard MISC by Does not apply route Expires 2025  Diagnosis: COPD 1 each 0    aspirin (RA ASPIRIN EC) 81 MG EC tablet take 1 tablet by mouth once daily 180 tablet 1    pantoprazole (PROTONIX) 40 MG tablet Take 1 tablet by mouth daily 90 tablet 1    lisinopril (PRINIVIL;ZESTRIL) 40 MG tablet take 1 tablet by mouth once daily 90 tablet 1    tamsulosin (FLOMAX) 0.4 MG capsule take 1 capsule by mouth daily TO FACILITATE PASSGE OF URETERAL STONE (Patient not taking: Reported on 2020) 90 capsule 1     No current facility-administered medications for this visit.       No Known Allergies    Health Maintenance   Topic Date Due    Shingles Vaccine (1 of 2) 2008    Potassium monitoring  2019    Creatinine monitoring  2019    DTaP/Tdap/Td vaccine (1 - Tdap) 2020 (Originally 1979)    A1C test (Diabetic or Prediabetic)

## 2020-07-31 RX ORDER — TAMSULOSIN HYDROCHLORIDE 0.4 MG/1
0.4 CAPSULE ORAL DAILY
Qty: 90 CAPSULE | Refills: 1 | Status: SHIPPED | OUTPATIENT
Start: 2020-07-31 | End: 2020-09-04 | Stop reason: SDUPTHER

## 2020-07-31 NOTE — TELEPHONE ENCOUNTER
Patient calling asking if he can be put back on flomax, he said that previously he thought it was causing him gas so he stopped it but he has figured out that the metformin was what was actually causing the gas he would like a refill of the flomax because it helped with his prostate? He \"thinks\" this was the medication that he was previously on but not 925% certain because he threw away the bottle. please advise.

## 2020-08-04 RX ORDER — PANTOPRAZOLE SODIUM 40 MG/1
40 TABLET, DELAYED RELEASE ORAL DAILY
Qty: 90 TABLET | Refills: 1 | Status: SHIPPED | OUTPATIENT
Start: 2020-08-04 | End: 2021-01-29

## 2020-08-04 NOTE — TELEPHONE ENCOUNTER
HTN (hypertension)     Atypical chest pain     Obstructive sleep apnea     Vocal cord polyp     Reflux laryngitis     Dysphagia     Coronary artery disease     High cholesterol     Chronic neck pain     Chronic headache     Hip arthritis     Chronic intermittent hypoxia with obstructive sleep apnea     Impaired fasting glucose     Leukoplakia of vocal cords     Colitis     Hepatic steatosis     S/P colonoscopy with polypectomy     Rectal polyp     Adenomatous polyp     Mixed hyperlipidemia     Prediabetes

## 2020-08-31 ENCOUNTER — TELEPHONE (OUTPATIENT)
Dept: ONCOLOGY | Age: 60
End: 2020-08-31

## 2020-08-31 NOTE — LETTER
8/31/2020        [address]  [facility] [address]    Dear Vandana Hogan: Your healthcare provider has ordered a low dose CT scan of the chest for lung cancer screening. You will find enclosed, information about CT lung screening. Please review the statement of understanding, you will be asked to sign a copy of this at the time of your CT scan    If you have not already been contacted to make the appointment for your scan, please call our scheduling department at [phone]    Keep in mind that CT lung screening does not take the place of smoking cessation. If you are a current smoker, you will find enclosed smoking cessation resources. Please do not hesitate to contact me if you have any questions or concerns.     [address],      [address] [facility] Lung Screening Program  [phone]

## 2020-09-04 ENCOUNTER — HOSPITAL ENCOUNTER (OUTPATIENT)
Age: 60
Setting detail: SPECIMEN
Discharge: HOME | End: 2020-09-04
Payer: COMMERCIAL

## 2020-09-04 ENCOUNTER — OFFICE VISIT (OUTPATIENT)
Dept: PRIMARY CARE CLINIC | Age: 60
End: 2020-09-04
Payer: COMMERCIAL

## 2020-09-04 VITALS
BODY MASS INDEX: 33.24 KG/M2 | WEIGHT: 218.6 LBS | SYSTOLIC BLOOD PRESSURE: 128 MMHG | OXYGEN SATURATION: 95 % | TEMPERATURE: 98.2 F | HEART RATE: 79 BPM | RESPIRATION RATE: 16 BRPM | DIASTOLIC BLOOD PRESSURE: 82 MMHG

## 2020-09-04 LAB
ABSOLUTE EOS #: 0.18 K/UL (ref 0–0.44)
ABSOLUTE IMMATURE GRANULOCYTE: <0.03 K/UL (ref 0–0.3)
ABSOLUTE LYMPH #: 1.34 K/UL (ref 1.1–3.7)
ABSOLUTE MONO #: 0.69 K/UL (ref 0.1–1.2)
ALBUMIN SERPL-MCNC: 4.4 G/DL (ref 3.5–5.2)
ALBUMIN/GLOBULIN RATIO: 1.3 (ref 1–2.5)
ALP BLD-CCNC: 75 U/L (ref 40–129)
ALT SERPL-CCNC: 27 U/L (ref 5–41)
ANION GAP SERPL CALCULATED.3IONS-SCNC: 10 MMOL/L (ref 9–17)
AST SERPL-CCNC: 23 U/L
BASOPHILS # BLD: 1 % (ref 0–2)
BASOPHILS ABSOLUTE: 0.04 K/UL (ref 0–0.2)
BILIRUB SERPL-MCNC: 0.56 MG/DL (ref 0.3–1.2)
BUN BLDV-MCNC: 33 MG/DL (ref 8–23)
BUN/CREAT BLD: ABNORMAL (ref 9–20)
CALCIUM SERPL-MCNC: 10.1 MG/DL (ref 8.6–10.4)
CHLORIDE BLD-SCNC: 99 MMOL/L (ref 98–107)
CHOLESTEROL/HDL RATIO: 4.6
CHOLESTEROL: 187 MG/DL
CO2: 27 MMOL/L (ref 20–31)
CREAT SERPL-MCNC: 1.41 MG/DL (ref 0.7–1.2)
DIFFERENTIAL TYPE: ABNORMAL
EOSINOPHILS RELATIVE PERCENT: 3 % (ref 1–4)
ESTIMATED AVERAGE GLUCOSE: 148 MG/DL
FOLATE: 18.7 NG/ML
GFR AFRICAN AMERICAN: >60 ML/MIN
GFR NON-AFRICAN AMERICAN: 51 ML/MIN
GFR SERPL CREATININE-BSD FRML MDRD: ABNORMAL ML/MIN/{1.73_M2}
GFR SERPL CREATININE-BSD FRML MDRD: ABNORMAL ML/MIN/{1.73_M2}
GLUCOSE BLD-MCNC: 96 MG/DL (ref 70–99)
HBA1C MFR BLD: 6.8 % (ref 4–6)
HCT VFR BLD CALC: 48.7 % (ref 40.7–50.3)
HDLC SERPL-MCNC: 41 MG/DL
HEMOGLOBIN: 14.8 G/DL (ref 13–17)
IMMATURE GRANULOCYTES: 0 %
LDL CHOLESTEROL: 127 MG/DL (ref 0–130)
LYMPHOCYTES # BLD: 24 % (ref 24–43)
MCH RBC QN AUTO: 26 PG (ref 25.2–33.5)
MCHC RBC AUTO-ENTMCNC: 30.4 G/DL (ref 28.4–34.8)
MCV RBC AUTO: 85.4 FL (ref 82.6–102.9)
MONOCYTES # BLD: 12 % (ref 3–12)
NRBC AUTOMATED: 0 PER 100 WBC
PDW BLD-RTO: 16.5 % (ref 11.8–14.4)
PLATELET # BLD: 167 K/UL (ref 138–453)
PLATELET ESTIMATE: ABNORMAL
PMV BLD AUTO: 12.2 FL (ref 8.1–13.5)
POTASSIUM SERPL-SCNC: 5.1 MMOL/L (ref 3.7–5.3)
RBC # BLD: 5.7 M/UL (ref 4.21–5.77)
RBC # BLD: ABNORMAL 10*6/UL
SEG NEUTROPHILS: 60 % (ref 36–65)
SEGMENTED NEUTROPHILS ABSOLUTE COUNT: 3.43 K/UL (ref 1.5–8.1)
SODIUM BLD-SCNC: 136 MMOL/L (ref 135–144)
TOTAL PROTEIN: 7.7 G/DL (ref 6.4–8.3)
TRIGL SERPL-MCNC: 95 MG/DL
TSH SERPL DL<=0.05 MIU/L-ACNC: 2.04 MIU/L (ref 0.3–5)
VITAMIN B-12: 817 PG/ML (ref 232–1245)
VITAMIN D 25-HYDROXY: 46.7 NG/ML (ref 30–100)
VLDLC SERPL CALC-MCNC: NORMAL MG/DL (ref 1–30)
WBC # BLD: 5.7 K/UL (ref 3.5–11.3)
WBC # BLD: ABNORMAL 10*3/UL

## 2020-09-04 PROCEDURE — 99396 PREV VISIT EST AGE 40-64: CPT | Performed by: INTERNAL MEDICINE

## 2020-09-04 RX ORDER — TAMSULOSIN HYDROCHLORIDE 0.4 MG/1
0.4 CAPSULE ORAL DAILY
Qty: 90 CAPSULE | Refills: 1 | Status: SHIPPED | OUTPATIENT
Start: 2020-09-04 | End: 2020-12-11

## 2020-09-04 RX ORDER — AMLODIPINE BESYLATE 5 MG/1
5 TABLET ORAL DAILY
Qty: 30 TABLET | Refills: 1 | Status: SHIPPED | OUTPATIENT
Start: 2020-09-04 | End: 2020-10-27

## 2020-09-08 RX ORDER — ATORVASTATIN CALCIUM 20 MG/1
TABLET, FILM COATED ORAL
Qty: 90 TABLET | Refills: 0 | Status: SHIPPED | OUTPATIENT
Start: 2020-09-08 | End: 2020-12-01

## 2020-09-08 NOTE — TELEPHONE ENCOUNTER
Last OV 09/04/2020    Next OV 12/11/2020    Health Maintenance   Topic Date Due    Diabetic foot exam  02/25/1968    Diabetic retinal exam  02/25/1968    Diabetic microalbuminuria test  02/25/1976    Shingles Vaccine (1 of 2) 02/25/2008    Low dose CT lung screening  09/05/2020    DTaP/Tdap/Td vaccine (1 - Tdap) 09/04/2021 (Originally 2/25/1977)    Flu vaccine (1) 09/04/2021 (Originally 9/1/2020)    A1C test (Diabetic or Prediabetic)  09/04/2021    Lipid screen  09/04/2021    Potassium monitoring  09/04/2021    Creatinine monitoring  09/04/2021    Colon cancer screen colonoscopy  08/11/2027    Pneumococcal 0-64 years Vaccine  Completed    Hepatitis C screen  Completed    HIV screen  Completed    Hepatitis A vaccine  Aged Out    Hepatitis B vaccine  Aged Out    Hib vaccine  Aged Out    Meningococcal (ACWY) vaccine  Aged Out             (applicable per patient's age: Cancer Screenings, Depression Screening, Fall Risk Screening, Immunizations)    Hemoglobin A1C (%)   Date Value   09/04/2020 6.8 (H)   09/05/2019 6.3 (H)   07/13/2018 6.1 (H)     LDL Cholesterol (mg/dL)   Date Value   09/04/2020 127     AST (U/L)   Date Value   09/04/2020 23     ALT (U/L)   Date Value   09/04/2020 27     BUN (mg/dL)   Date Value   09/04/2020 33 (H)      (goal A1C is < 7)   (goal LDL is <100) need 30-50% reduction from baseline     BP Readings from Last 3 Encounters:   09/04/20 128/82   06/04/20 132/72   04/14/20 (!) 160/105    (goal /80)      All Future Testing planned in [facility]:  Lab Frequency Next Occurrence   CT LUNG SCREENING Once 09/27/2020   CT Lung Screen (Annual) Once 09/10/2020       Next Visit Date:  Future Appointments   Date Time Provider Spike Carl   9/9/2020 11:00 AM [facility] [place] [facility] RM 2 [facility] PB CT [facility] [place]   10/9/2020  1:15 PM Chang Ambrosio, DO Resp [address]   12/11/2020  9:45 AM Bronwyn Pablo MD [facility]            Patient Active Problem List:     Laryngeal cancer ([facility] [address]     Osteoarthritis     Heart burn     Colon polyp     COPD (chronic obstructive pulmonary disease) (HCC)     Headache     Neck pain     HTN (hypertension)     Atypical chest pain     Obstructive sleep apnea     Vocal cord polyp     Reflux laryngitis     Dysphagia     Coronary artery disease     High cholesterol     Chronic neck pain     Chronic headache     Hip arthritis     Chronic intermittent hypoxia with obstructive sleep apnea     Impaired fasting glucose     Leukoplakia of vocal cords     Colitis     Hepatic steatosis     S/P colonoscopy with polypectomy     Rectal polyp     Adenomatous polyp     Mixed hyperlipidemia     Prediabetes

## 2020-09-10 ENCOUNTER — HOSPITAL ENCOUNTER (OUTPATIENT)
Dept: CT IMAGING | Age: 60
Discharge: HOME | End: 2020-09-12
Payer: COMMERCIAL

## 2020-09-10 PROCEDURE — G0297 LDCT FOR LUNG CA SCREEN: HCPCS

## 2020-09-10 ASSESSMENT — ENCOUNTER SYMPTOMS
COUGH: 0
SORE THROAT: 0
WHEEZING: 0
SHORTNESS OF BREATH: 0
ABDOMINAL DISTENTION: 0
CONSTIPATION: 0
VOMITING: 0
ABDOMINAL PAIN: 0
NAUSEA: 0
SINUS PRESSURE: 0
BACK PAIN: 0

## 2020-09-10 NOTE — PROGRESS NOTES
[address] PRIMARY CARE  [facility] [address] [facility] [address]  [address] [address]  Dept: [phone]  Dept Fax: [phone]    Ling Asif is a 56 y.o. male who presents today for his medical conditions/complaints as noted below. Chief Complaint   Patient presents with    Follow-up     Routine, Discuss Medication       HPI:     This is a 25-year-old male who is here for annual physical.  He is due for blood work. He needs refills on his tamsulosin. Reviewed all his meds and updated the list.  Blood pressures well controlled. Hemoglobin A1C (%)   Date Value   2020 6.8 (H)   2019 6.3 (H)   2018 6.1 (H)             ( goal A1C is < 7)   No results found for: LABMICR  LDL Cholesterol (mg/dL)   Date Value   2020 127   2018 126   2017 116       (goal LDL is <100)   AST (U/L)   Date Value   2020 23     ALT (U/L)   Date Value   2020 27     BUN (mg/dL)   Date Value   2020 33 (H)     BP Readings from Last 3 Encounters:   20 128/82   20 132/72   20 (!) 160/105          (goal 120/80)    Past Medical History:   Diagnosis Date    Chronic intermittent hypoxia with obstructive sleep apnea     COPD (chronic obstructive pulmonary disease) (HCC)     Full dentures     Upper & Lower    Heart burn     GERD    Hyperlipidemia     Hypertension     On Meds    Larynx cancer ([address]     On Rad.  Tx.    Migraines     On home oxygen therapy 2013    2L/NC nightly    Osteoarthritis     rt. hip pain    Sleep apnea     CPAP with Oxygen nightly    Wears eyeglasses       Past Surgical History:   Procedure Laterality Date    CATARACT REMOVAL Bilateral     MULTIPLE MORE ON LT    COLONOSCOPY      5 Polyps removed    COLONOSCOPY  2017    with biopsy    HIP ARTHROPLASTY Right 14    LARYNGOSCOPY  10/17/2016    with biopsies    MICROLARYNGOSCOPY W BIOPSY      UT COLSC FLX W/REMOVAL LESION BY HOT BX FORCEPS N/A 2017    COLONOSCOPY POLYPECTOMY HOT BIOPSY performed by Pal Hanna MD at [address] History   Problem Relation Age of Onset    Stomach Cancer Mother     Lung Cancer Mother     Lung Cancer Brother     Esophageal Cancer Brother        Social History     Tobacco Use    Smoking status: Former Smoker     Packs/day: 2.00     Years: 45.00     Pack years: 90.00     Types: Cigarettes     Start date: 10/4/1970     Last attempt to quit: 2013     Years since quittin.3    Smokeless tobacco: Never Used    Tobacco comment: quit 3 yrs ago   Substance Use Topics    Alcohol use: No     Alcohol/week: 0.0 standard drinks      Current Outpatient Medications   Medication Sig Dispense Refill    tamsulosin (FLOMAX) 0.4 MG capsule Take 1 capsule by mouth daily 90 capsule 1    amLODIPine (NORVASC) 5 MG tablet Take 1 tablet by mouth daily 30 tablet 1    pantoprazole (PROTONIX) 40 MG tablet take 1 tablet by mouth daily 90 tablet 1    guaiFENesin (MUCINEX) 600 MG extended release tablet take 1 tablet by mouth twice a day if needed for congestion 60 tablet 0    ibuprofen (ADVIL;MOTRIN) 800 MG tablet Take 1 tablet by mouth every 8 hours as needed for Pain 90 tablet 1    Handicap Placard MISC by Does not apply route Expires 2025  Diagnosis: COPD 1 each 0    aspirin (RA ASPIRIN EC) 81 MG EC tablet take 1 tablet by mouth once daily 180 tablet 1    lisinopril (PRINIVIL;ZESTRIL) 40 MG tablet take 1 tablet by mouth once daily 90 tablet 1    atorvastatin (LIPITOR) 20 MG tablet take 1 tablet by mouth at bedtime -PT HAS BEEN TAKING  MEDS FROM PREVIOUS MD (2015. ..) 90 tablet 0     No current facility-administered medications for this visit.       No Known Allergies    Health Maintenance   Topic Date Due    Diabetic foot exam  1968    Diabetic retinal exam  1968    Diabetic microalbuminuria test  1976    Shingles Vaccine (1 of 2) 2008    Low dose CT lung screening  09/05/2020    DTaP/Tdap/Td vaccine (1 - Tdap) 09/04/2021 (Originally 2/25/1977)    Flu vaccine (1) 09/04/2021 (Originally 9/1/2020)    A1C test (Diabetic or Prediabetic)  09/04/2021    Lipid screen  09/04/2021    Potassium monitoring  09/04/2021    Creatinine monitoring  09/04/2021    Colon cancer screen colonoscopy  08/11/2027    Pneumococcal 0-64 years Vaccine  Completed    Hepatitis C screen  Completed    HIV screen  Completed    Hepatitis A vaccine  Aged Out    Hepatitis B vaccine  Aged Out    Hib vaccine  Aged Out    Meningococcal (ACWY) vaccine  Aged Out       Subjective:      Review of Systems   Constitutional: Negative for appetite change, chills, fatigue and fever. HENT: Negative for congestion, sinus pressure, sneezing and sore throat. Eyes: Negative for visual disturbance. Respiratory: Negative for cough, shortness of breath and wheezing. Cardiovascular: Negative for chest pain and palpitations. Gastrointestinal: Negative for abdominal distention, abdominal pain, constipation, nausea and vomiting. Endocrine: Negative for cold intolerance, heat intolerance, polydipsia, polyphagia and polyuria. Genitourinary: Negative for decreased urine volume, difficulty urinating and urgency. Musculoskeletal: Negative for back pain and joint swelling. Skin: Negative for rash. Neurological: Negative for dizziness and headaches. Psychiatric/Behavioral: Negative for sleep disturbance. The patient is not nervous/anxious. All other systems reviewed and are negative. Objective:     Physical Exam  Vitals signs and nursing note reviewed. Constitutional:       General: He is not in acute distress. Appearance: Normal appearance. He is well-developed. He is not diaphoretic. HENT:      Head: Normocephalic and atraumatic. Right Ear: Hearing normal.      Left Ear: Hearing normal.      Mouth/Throat:      Pharynx: Uvula midline.    Eyes:      General: No scleral Dispense: 90 capsule; Refill: 1    3. Essential hypertension    - amLODIPine (NORVASC) 5 MG tablet; Take 1 tablet by mouth daily  Dispense: 30 tablet; Refill: 1    4. Vitamin D deficiency    - Vitamin D 25 Hydroxy; Future            Diagnosis Orders   1. Annual physical exam  CBC Auto Differential    TSH with Reflex    Lipid Panel    Comprehensive Metabolic Panel    Vitamin B12 & Folate    Hemoglobin A1C   2. Benign prostatic hyperplasia with weak urinary stream  tamsulosin (FLOMAX) 0.4 MG capsule   3. Essential hypertension  amLODIPine (NORVASC) 5 MG tablet   4. Vitamin D deficiency  Vitamin D 25 Hydroxy           Plan:      Return in about 3 months (around 12/4/2020) for Routine follow-up. Orders Placed This Encounter   Procedures    CBC Auto Differential     Standing Status:   Future     Number of Occurrences:   1     Standing Expiration Date:   9/4/2021    TSH with Reflex     Standing Status:   Future     Number of Occurrences:   1     Standing Expiration Date:   9/4/2021    Lipid Panel     Standing Status:   Future     Number of Occurrences:   1     Standing Expiration Date:   12/4/2020     Order Specific Question:   Is Patient Fasting?/# of Hours     Answer:    Fast 8-10 hours    Comprehensive Metabolic Panel     Standing Status:   Future     Number of Occurrences:   1     Standing Expiration Date:   9/4/2021    Vitamin B12 & Folate     Standing Status:   Future     Number of Occurrences:   1     Standing Expiration Date:   9/4/2021    Vitamin D 25 Hydroxy     Standing Status:   Future     Number of Occurrences:   1     Standing Expiration Date:   9/4/2021    Hemoglobin A1C     Standing Status:   Future     Number of Occurrences:   1     Standing Expiration Date:   9/4/2021     Orders Placed This Encounter   Medications    tamsulosin (FLOMAX) 0.4 MG capsule     Sig: Take 1 capsule by mouth daily     Dispense:  90 capsule     Refill:  1    amLODIPine (NORVASC) 5 MG tablet     Sig: Take 1 tablet by mouth daily     Dispense:  30 tablet     Refill:  1         Patient given educational materials - see patient instructions. Discussed use, benefit, and side effects of prescribedmedications. All patient questions answered. Pt voiced understanding. Reviewed health maintenance. Instructed to continue current medications, diet and exercise. Patient agreed with treatment plan. Follow up as directed. I spent a total of 25 minutes face to face with this patient. Over 50% of that time was spent on counseling and care coordination. Please see assessment and plan for details. Electronically signed by Eva Velazquez MD on 9/10/2020 at 10:22 AM      Please note that this chart was generated using voice recognition Dragon dictation software. Although every effort was made to ensure the accuracy of this automatedtranscription, some errors in transcription may have occurred.

## 2020-10-01 RX ORDER — ASPIRIN 81 MG/1
TABLET ORAL
Qty: 90 TABLET | Refills: 0 | Status: SHIPPED | OUTPATIENT
Start: 2020-10-01 | End: 2021-01-05

## 2020-10-06 RX ORDER — LISINOPRIL 40 MG/1
TABLET ORAL
Qty: 90 TABLET | Refills: 1 | Status: SHIPPED | OUTPATIENT
Start: 2020-10-06 | End: 2021-03-19 | Stop reason: SDUPTHER

## 2020-10-06 NOTE — TELEPHONE ENCOUNTER
LOV 9/4/20  NOV 12/11/2020    Health Maintenance   Topic Date Due    Diabetic foot exam  02/25/1968    Diabetic retinal exam  02/25/1968    Diabetic microalbuminuria test  02/25/1976    Shingles Vaccine (1 of 2) 02/25/2008    DTaP/Tdap/Td vaccine (1 - Tdap) 09/04/2021 (Originally 2/25/1977)    Flu vaccine (1) 09/04/2021 (Originally 9/1/2020)    A1C test (Diabetic or Prediabetic)  09/04/2021    Lipid screen  09/04/2021    Potassium monitoring  09/04/2021    Creatinine monitoring  09/04/2021    Low dose CT lung screening  09/10/2021    Colon cancer screen colonoscopy  08/11/2027    Pneumococcal 0-64 years Vaccine  Completed    Hepatitis C screen  Completed    HIV screen  Completed    Hepatitis A vaccine  Aged Out    Hepatitis B vaccine  Aged Out    Hib vaccine  Aged Out    Meningococcal (ACWY) vaccine  Aged Out             (applicable per patient's age: Cancer Screenings, Depression Screening, Fall Risk Screening, Immunizations)    Hemoglobin A1C (%)   Date Value   09/04/2020 6.8 (H)   09/05/2019 6.3 (H)   07/13/2018 6.1 (H)     LDL Cholesterol (mg/dL)   Date Value   09/04/2020 127     AST (U/L)   Date Value   09/04/2020 23     ALT (U/L)   Date Value   09/04/2020 27     BUN (mg/dL)   Date Value   09/04/2020 33 (H)      (goal A1C is < 7)   (goal LDL is <100) need 30-50% reduction from baseline     BP Readings from Last 3 Encounters:   09/04/20 128/82   06/04/20 132/72   04/14/20 (!) 160/105    (goal /80)      All Future Testing planned in [facility]:  Lab Frequency Next Occurrence   CT Lung Screen (Annual) Once 09/10/2020       Next Visit Date:  Future Appointments   Date Time Provider Spike Carl   10/9/2020  1:15 PM Bessy Schmidt[address]   12/11/2020  9:45 AM Avinash Last MD [facility]            Patient Active Problem List:     Laryngeal cancer [facility])     Osteoarthritis     Heart burn     Colon polyp     COPD (chronic obstructive pulmonary disease) ([facility] [address] Headache     Neck pain     HTN (hypertension)     Atypical chest pain     Obstructive sleep apnea     Vocal cord polyp     Reflux laryngitis     Dysphagia     Coronary artery disease     High cholesterol     Chronic neck pain     Chronic headache     Hip arthritis     Chronic intermittent hypoxia with obstructive sleep apnea     Impaired fasting glucose     Leukoplakia of vocal cords     Colitis     Hepatic steatosis     S/P colonoscopy with polypectomy     Rectal polyp     Adenomatous polyp     Mixed hyperlipidemia     Prediabetes

## 2020-10-09 ENCOUNTER — OFFICE VISIT (OUTPATIENT)
Dept: PULMONOLOGY | Age: 60
End: 2020-10-09
Payer: COMMERCIAL

## 2020-10-09 VITALS
HEIGHT: 68 IN | RESPIRATION RATE: 16 BRPM | WEIGHT: 218 LBS | BODY MASS INDEX: 33.04 KG/M2 | OXYGEN SATURATION: 90 % | SYSTOLIC BLOOD PRESSURE: 139 MMHG | DIASTOLIC BLOOD PRESSURE: 89 MMHG | HEART RATE: 68 BPM

## 2020-10-09 PROCEDURE — 3017F COLORECTAL CA SCREEN DOC REV: CPT | Performed by: INTERNAL MEDICINE

## 2020-10-09 PROCEDURE — 3023F SPIROM DOC REV: CPT | Performed by: INTERNAL MEDICINE

## 2020-10-09 PROCEDURE — G8427 DOCREV CUR MEDS BY ELIG CLIN: HCPCS | Performed by: INTERNAL MEDICINE

## 2020-10-09 PROCEDURE — G8484 FLU IMMUNIZE NO ADMIN: HCPCS | Performed by: INTERNAL MEDICINE

## 2020-10-09 PROCEDURE — 1036F TOBACCO NON-USER: CPT | Performed by: INTERNAL MEDICINE

## 2020-10-09 PROCEDURE — G8926 SPIRO NO PERF OR DOC: HCPCS | Performed by: INTERNAL MEDICINE

## 2020-10-09 PROCEDURE — G8417 CALC BMI ABV UP PARAM F/U: HCPCS | Performed by: INTERNAL MEDICINE

## 2020-10-09 PROCEDURE — 99213 OFFICE O/P EST LOW 20 MIN: CPT | Performed by: INTERNAL MEDICINE

## 2020-10-09 ASSESSMENT — ENCOUNTER SYMPTOMS
RESPIRATORY NEGATIVE: 1
EYES NEGATIVE: 1

## 2020-10-09 NOTE — PROGRESS NOTES
Subjective:      Patient ID: Purvi Edmond is a 56 y.o. male. HPI  Patient returns for follow up of sleep apnea. Since last visit 12 months ago, patient has been very compliant with CPAP. Uses every night, for the entire night. Reports refreshing and restorative sleep. Denies snoring. Reports normal daytime alertness. Believes benefiting greatly. Compliance download from 6/27/20 to 9/24/20 shows 100% compliance for average of 9.8hrs per night. Residual AHI 1.5. Mean [id number] cm H2O. The patient's auto titrating CPAP is set on 14-20. I suggested that we decrease his lower pressure as his median is at this level. Patient already received his flu shot. Dose lung screening 9/5/2020 is category 1. Repeat in 1 year. Denies shortness of breath. Not currently on bronchodilators. Review of Systems   Constitutional: Negative. HENT: Negative. Eyes: Negative. Respiratory: Negative. Cardiovascular: Negative. All other systems reviewed and are negative. Objective:     Physical Exam  Vitals signs and nursing note reviewed. Constitutional:       Appearance: He is well-developed. He is obese. HENT:      Mouth/Throat:      Comments: Large tongue , low hanging soft palate and large uvula. Overall pharyngeal orifice moderately decreased    Eyes:      General: No scleral icterus. Conjunctiva/sclera: Conjunctivae normal.   Neck:      Musculoskeletal: Neck supple. Thyroid: No thyromegaly. Vascular: No JVD. Trachea: No tracheal deviation. Cardiovascular:      Rate and Rhythm: Normal rate and regular rhythm. Heart sounds: Normal heart sounds. No murmur. No gallop. Pulmonary:      Effort: Pulmonary effort is normal. No respiratory distress. Breath sounds: No wheezing or rales. Chest:      Chest wall: No tenderness. Abdominal:      Palpations: Abdomen is soft. Tenderness: There is no abdominal tenderness.    Lymphadenopathy:      Cervical: No cervical adenopathy. Skin:     General: Skin is warm and dry. Neurological:      Mental Status: He is alert and oriented to person, place, and time.          Wt Readings from Last 3 Encounters:   10/09/20 218 lb (98.9 kg)   09/04/20 218 lb 9.6 oz (99.2 kg)   06/04/20 215 lb 3.2 oz (97.6 kg)          Results for orders placed or performed during the hospital encounter of 09/04/20   Vitamin D 25 Hydroxy   Result Value Ref Range    Vit D, 25-Hydroxy 46.7 30.0 - 100.0 ng/mL   Vitamin B12 & Folate   Result Value Ref Range    Vitamin B-12 817 232 - 1245 pg/mL    Folate 18.7 >4.8 ng/mL   Comprehensive Metabolic Panel   Result Value Ref Range    Glucose 96 70 - 99 mg/dL    BUN 33 (H) 8 - 23 mg/dL    CREATININE 1.41 (H) 0.70 - 1.20 mg/dL    Bun/Cre Ratio NOT REPORTED 9 - 20    Calcium 10.1 8.6 - 10.4 mg/dL    Sodium 136 135 - 144 mmol/L    Potassium 5.1 3.7 - 5.3 mmol/L    Chloride 99 98 - 107 mmol/L    CO2 27 20 - 31 mmol/L    Anion Gap 10 9 - 17 mmol/L    Alkaline Phosphatase 75 40 - 129 U/L    ALT 27 5 - 41 U/L    AST 23 <40 U/L    Total Bilirubin 0.56 0.3 - 1.2 mg/dL    Total Protein 7.7 6.4 - 8.3 g/dL    Alb 4.4 3.5 - 5.2 g/dL    Albumin/Globulin Ratio 1.3 1.0 - 2.5    GFR Non- 51 (L) >60 mL/min    GFR African American >60 >60 mL/min    GFR Comment          GFR Staging NOT REPORTED    Lipid Panel   Result Value Ref Range    Cholesterol 187 <200 mg/dL    HDL 41 >40 mg/dL    LDL Cholesterol 127 0 - 130 mg/dL    Chol/HDL Ratio 4.6 <5    Triglycerides 95 <150 mg/dL    VLDL NOT REPORTED 1 - 30 mg/dL   TSH with Reflex   Result Value Ref Range    TSH 2.04 0.30 - 5.00 mIU/L   CBC Auto Differential   Result Value Ref Range    WBC 5.7 3.5 - 11.3 k/uL    RBC 5.70 4.21 - 5.77 m/uL    Hemoglobin 14.8 13.0 - 17.0 g/dL    Hematocrit 48.7 40.7 - 50.3 %    MCV 85.4 82.6 - 102.9 fL    MCH 26.0 25.2 - 33.5 pg    MCHC 30.4 28.4 - 34.8 g/dL    RDW 16.5 (H) 11.8 - 14.4 %    Platelets 503 365 - 833 k/uL    MPV 12.2 8.1 - 13.5 fL NRBC Automated 0.0 0.0 per 100 WBC    Differential Type NOT REPORTED     Seg Neutrophils 60 36 - 65 %    Lymphocytes 24 24 - 43 %    Monocytes 12 3 - 12 %    Eosinophils % 3 1 - 4 %    Basophils 1 0 - 2 %    Immature Granulocytes 0 0 %    Segs Absolute 3.43 1.50 - 8.10 k/uL    Absolute Lymph # 1.34 1.10 - 3.70 k/uL    Absolute Mono # 0.69 0.10 - 1.20 k/uL    Absolute Eos # 0.18 0.00 - 0.44 k/uL    Basophils Absolute 0.04 0.00 - 0.20 k/uL    Absolute Immature Granulocyte <0.03 0.00 - 0.30 k/uL    WBC Morphology NOT REPORTED     RBC Morphology ANISOCYTOSIS PRESENT     Platelet Estimate NOT REPORTED    Hemoglobin A1C   Result Value Ref Range    Hemoglobin A1C 6.8 (H) 4.0 - 6.0 %    Estimated Avg Glucose 148 mg/dL       Assessment:         1. SHAMAR on CPAP    2. Moderate COPD (chronic obstructive pulmonary disease) (HCC)    3. Personal history of tobacco use    4. Stopped smoking with greater than 40 pack year history    5. Laryngeal cancer ([facility] [address]    6. Erectile disorder due to medical condition in male          Plan:      1. Auto titrating CPAP 10/20.  2. Weight loss. 3. Avoid sedative hypnotics and alcohol at bedtime. 4. Discussed strategies to mitigate risk of COVID-19 infection. 5. Repeat low-dose screening CT scan of the chest next summer. 6. Patient already received his flu shot.   7. Return in 1

## 2020-10-09 NOTE — PATIENT INSTRUCTIONS
Office will fax Cpap order to your DME   ls   10/12/20 faxed order and office note to [address] [place] [address]

## 2020-10-27 RX ORDER — AMLODIPINE BESYLATE 5 MG/1
5 TABLET ORAL DAILY
Qty: 90 TABLET | Refills: 0 | Status: SHIPPED | OUTPATIENT
Start: 2020-10-27 | End: 2020-12-11 | Stop reason: SDUPTHER

## 2020-11-05 ENCOUNTER — TELEPHONE (OUTPATIENT)
Dept: UROLOGY | Age: 60
End: 2020-11-05

## 2020-11-05 NOTE — TELEPHONE ENCOUNTER
Pt was contacted for over due yearly check up. Pt stated that he has not taken his medication in over a year and would not like to be seen.

## 2020-11-29 ENCOUNTER — TELEPHONE (OUTPATIENT)
Dept: PRIMARY CARE CLINIC | Age: 60
End: 2020-11-29

## 2020-12-01 RX ORDER — ATORVASTATIN CALCIUM 20 MG/1
40 TABLET, FILM COATED ORAL DAILY
Qty: 90 TABLET | Refills: 0 | Status: SHIPPED | OUTPATIENT
Start: 2020-12-01 | End: 2021-01-28

## 2020-12-11 ENCOUNTER — OFFICE VISIT (OUTPATIENT)
Dept: PRIMARY CARE CLINIC | Age: 60
End: 2020-12-11
Payer: COMMERCIAL

## 2020-12-11 VITALS
TEMPERATURE: 97.2 F | HEART RATE: 76 BPM | SYSTOLIC BLOOD PRESSURE: 136 MMHG | WEIGHT: 226 LBS | DIASTOLIC BLOOD PRESSURE: 82 MMHG | BODY MASS INDEX: 34.36 KG/M2 | OXYGEN SATURATION: 97 % | RESPIRATION RATE: 16 BRPM

## 2020-12-11 LAB — HBA1C MFR BLD: 5.9 %

## 2020-12-11 PROCEDURE — 3044F HG A1C LEVEL LT 7.0%: CPT | Performed by: INTERNAL MEDICINE

## 2020-12-11 PROCEDURE — G8417 CALC BMI ABV UP PARAM F/U: HCPCS | Performed by: INTERNAL MEDICINE

## 2020-12-11 PROCEDURE — 3017F COLORECTAL CA SCREEN DOC REV: CPT | Performed by: INTERNAL MEDICINE

## 2020-12-11 PROCEDURE — 1036F TOBACCO NON-USER: CPT | Performed by: INTERNAL MEDICINE

## 2020-12-11 PROCEDURE — 99214 OFFICE O/P EST MOD 30 MIN: CPT | Performed by: INTERNAL MEDICINE

## 2020-12-11 PROCEDURE — 83036 HEMOGLOBIN GLYCOSYLATED A1C: CPT | Performed by: INTERNAL MEDICINE

## 2020-12-11 PROCEDURE — G8427 DOCREV CUR MEDS BY ELIG CLIN: HCPCS | Performed by: INTERNAL MEDICINE

## 2020-12-11 PROCEDURE — G8484 FLU IMMUNIZE NO ADMIN: HCPCS | Performed by: INTERNAL MEDICINE

## 2020-12-11 PROCEDURE — 2022F DILAT RTA XM EVC RTNOPTHY: CPT | Performed by: INTERNAL MEDICINE

## 2020-12-11 RX ORDER — AMLODIPINE BESYLATE 5 MG/1
5 TABLET ORAL DAILY
Qty: 90 TABLET | Refills: 0 | Status: SHIPPED | OUTPATIENT
Start: 2020-12-11 | End: 2021-03-19 | Stop reason: SDUPTHER

## 2020-12-11 RX ORDER — OXYCODONE HYDROCHLORIDE AND ACETAMINOPHEN 5; 325 MG/1; MG/1
1 TABLET ORAL EVERY 6 HOURS PRN
Qty: 12 TABLET | Refills: 0 | Status: SHIPPED | OUTPATIENT
Start: 2020-12-11 | End: 2020-12-26

## 2020-12-16 ENCOUNTER — HOSPITAL ENCOUNTER (OUTPATIENT)
Dept: ULTRASOUND IMAGING | Age: 60
Discharge: HOME | End: 2020-12-18
Payer: COMMERCIAL

## 2020-12-16 ENCOUNTER — HOSPITAL ENCOUNTER (OUTPATIENT)
Age: 60
End: 2020-12-16
Payer: COMMERCIAL

## 2020-12-16 PROCEDURE — 76705 ECHO EXAM OF ABDOMEN: CPT

## 2020-12-19 ASSESSMENT — ENCOUNTER SYMPTOMS
ABDOMINAL DISTENTION: 0
COUGH: 0
WHEEZING: 0
VOMITING: 0
SHORTNESS OF BREATH: 0
SORE THROAT: 0
ABDOMINAL PAIN: 1
NAUSEA: 0
BACK PAIN: 0
CONSTIPATION: 0
SINUS PRESSURE: 0

## 2020-12-19 NOTE — PROGRESS NOTES
[facility] PRIMARY CARE  [facility] [address] [facility] [address]  [address] [address]  Dept: [phone]  Dept Fax: [phone]    Marsha Stevenson is a 56 y.o. male who presents today for his medical conditions/complaints as noted below. Chief Complaint   Patient presents with    Follow-up       HPI:     This is a 68-year-old male who is here for regular follow-up. He has past medical history of essential hypertension, type 2 diabetes, right upper quadrant abdominal pain, GERD history of laryngeal cancer. Currently has been doing well and blood pressures well controlled. He needs refill on his Norvasc. His wife was recently diagnosed with H. pylori infection and he has been also having epigastric pain and discussed about getting tested as well. He has his gallbladder intact and discussed about getting a right upper quadrant ultrasound to rule out cholelithiasis. It usually associated with food no diarrhea or constipation. No other complaints. Hemoglobin A1C (%)   Date Value   2020 5.9   2020 6.8 (H)   2019 6.3 (H)             ( goal A1C is < 7)   No results found for: LABMICR  LDL Cholesterol (mg/dL)   Date Value   2020 127   2018 126   2017 116       (goal LDL is <100)   AST (U/L)   Date Value   2020 23     ALT (U/L)   Date Value   2020 27     BUN (mg/dL)   Date Value   2020 33 (H)     BP Readings from Last 3 Encounters:   20 136/82   10/09/20 139/89   20 128/82          (goal 120/80)    Past Medical History:   Diagnosis Date    Chronic intermittent hypoxia with obstructive sleep apnea     COPD (chronic obstructive pulmonary disease) (HCC)     Full dentures     Upper & Lower    Heart burn     GERD    Hyperlipidemia     Hypertension     On Meds    Larynx cancer ([address] 2007    On Rad.  Tx.    Migraines     On home oxygen therapy 2013    2L/NC nightly    Osteoarthritis     rt. hip pain  Sleep apnea     CPAP with Oxygen nightly    Wears eyeglasses       Past Surgical History:   Procedure Laterality Date    CATARACT REMOVAL Bilateral     MULTIPLE MORE ON LT    COLONOSCOPY      5 Polyps removed    COLONOSCOPY  2017    with biopsy    HIP ARTHROPLASTY Right 14    LARYNGOSCOPY  10/17/2016    with biopsies    MICROLARYNGOSCOPY W BIOPSY      SD COLSC FLX W/REMOVAL LESION BY HOT BX FORCEPS N/A 2017    COLONOSCOPY POLYPECTOMY HOT BIOPSY performed by Zehra Calhoun MD at [facility] History   Problem Relation Age of Onset    Stomach Cancer Mother     Lung Cancer Mother     Lung Cancer Brother     Esophageal Cancer Brother        Social History     Tobacco Use    Smoking status: Former Smoker     Packs/day: 2.00     Years: 45.00     Pack years: 90.00     Types: Cigarettes     Start date: 10/4/1970     Quit date: 2013     Years since quittin.5    Smokeless tobacco: Never Used    Tobacco comment: quit 3 yrs ago   Substance Use Topics    Alcohol use: No     Alcohol/week: 0.0 standard drinks      Current Outpatient Medications   Medication Sig Dispense Refill    amLODIPine (NORVASC) 5 MG tablet Take 1 tablet by mouth daily 90 tablet 0    oxyCODONE-acetaminophen (PERCOCET) 5-325 MG per tablet Take 1 tablet by mouth every 6 hours as needed for Pain for up to 15 days. Intended supply: 3 days.  Take lowest dose possible to manage pain 12 tablet 0    lisinopril (PRINIVIL;ZESTRIL) 40 MG tablet take 1 tablet by mouth once daily 90 tablet 1    aspirin (ASPIRIN LOW DOSE) 81 MG EC tablet take 1 tablet by mouth once daily 90 tablet 0    pantoprazole (PROTONIX) 40 MG tablet take 1 tablet by mouth daily 90 tablet 1    guaiFENesin (MUCINEX) 600 MG extended release tablet take 1 tablet by mouth twice a day if needed for congestion 60 tablet 0    Handicap Placard MISC by Does not apply route Expires 2025  Diagnosis: COPD 1 each 0 Constitutional:       General: He is not in acute distress. Appearance: Normal appearance. He is well-developed. He is not diaphoretic. HENT:      Head: Normocephalic and atraumatic. Right Ear: Hearing normal.      Left Ear: Hearing normal.      Mouth/Throat:      Pharynx: Uvula midline. Eyes:      General: No scleral icterus. Conjunctiva/sclera: Conjunctivae normal.      Pupils: Pupils are equal, round, and reactive to light. Neck:      Musculoskeletal: Full passive range of motion without pain and normal range of motion. Thyroid: No thyromegaly. Vascular: No JVD. Trachea: Phonation normal.   Cardiovascular:      Rate and Rhythm: Normal rate and regular rhythm. Pulses: Normal pulses. Carotid pulses are 2+ on the right side and 2+ on the left side. Radial pulses are 2+ on the right side and 2+ on the left side. Heart sounds: Normal heart sounds. No murmur. Pulmonary:      Effort: Pulmonary effort is normal. No accessory muscle usage or respiratory distress. Breath sounds: Normal breath sounds. No wheezing or rales. Abdominal:      General: Bowel sounds are normal. There is no distension. Palpations: Abdomen is soft. Tenderness: There is no abdominal tenderness. There is no rebound. Musculoskeletal: Normal range of motion. General: No deformity. Lymphadenopathy:      Cervical: No cervical adenopathy. Skin:     General: Skin is warm. Capillary Refill: Capillary refill takes less than 2 seconds. Findings: No rash. Nails: There is no clubbing. Neurological:      Mental Status: He is alert and oriented to person, place, and time. Sensory: No sensory deficit.    Psychiatric:         Speech: Speech normal.         Behavior: Behavior normal.       /82   Pulse 76   Temp 97.2 °F (36.2 °C) (Temporal)   Resp 16   Wt 226 lb (102.5 kg)   SpO2 97%   BMI 34.36 kg/m²     Assessment:          1. RUQ pain - oxyCODONE-acetaminophen (PERCOCET) 5-325 MG per tablet; Take 1 tablet by mouth every 6 hours as needed for Pain for up to 15 days. Intended supply: 3 days. Take lowest dose possible to manage pain  Dispense: 12 tablet; Refill: 0  - US GALLBLADDER RUQ; Future    2. Type 2 diabetes mellitus with other specified complication, unspecified whether long term insulin use (HCC)    - POCT glycosylated hemoglobin (Hb A1C)    3. Essential hypertension  - amLODIPine (NORVASC) 5 MG tablet; Take 1 tablet by mouth daily  Dispense: 90 tablet; Refill: 0    4. Gastroesophageal reflux disease, unspecified whether esophagitis present  - H. Pylori Breath Test; Future            Diagnosis Orders   1. RUQ pain  oxyCODONE-acetaminophen (PERCOCET) 5-325 MG per tablet    US GALLBLADDER RUQ   2. Type 2 diabetes mellitus with other specified complication, unspecified whether long term insulin use (HCC)  POCT glycosylated hemoglobin (Hb A1C)   3. Essential hypertension  amLODIPine (NORVASC) 5 MG tablet   4. Gastroesophageal reflux disease, unspecified whether esophagitis present  H. Pylori Breath Test           Plan:      Return in about 3 months (around 3/11/2021) for Routine follow-up. Orders Placed This Encounter   Procedures    US GALLBLADDER RUQ     Standing Status:   Future     Number of Occurrences:   1     Standing Expiration Date:   6/11/2021     Order Specific Question:   Reason for exam:     Answer:   RUQ pain consistent with biliary colic    H.  Pylori Breath Test     Standing Status:   Future     Standing Expiration Date:   12/11/2021    POCT glycosylated hemoglobin (Hb A1C)     Orders Placed This Encounter   Medications    amLODIPine (NORVASC) 5 MG tablet     Sig: Take 1 tablet by mouth daily     Dispense:  90 tablet     Refill:  0    oxyCODONE-acetaminophen (PERCOCET) 5-325 MG per tablet Sig: Take 1 tablet by mouth every 6 hours as needed for Pain for up to 15 days. Intended supply: 3 days. Take lowest dose possible to manage pain     Dispense:  12 tablet     Refill:  0     Reduce doses taken as pain becomes manageable         Patient given educational materials - see patient instructions. Discussed use, benefit, and side effects of prescribedmedications. All patient questions answered. Pt voiced understanding. Reviewed health maintenance. Instructed to continue current medications, diet and exercise. Patient agreed with treatment plan. Follow up as directed. I spent a total of 25 minutes face to face with this patient. Over 50% of that time was spent on counseling and care coordination. Please see assessment and plan for details. Electronically signed by Royal Tejal MD on 12/19/2020 at 9:57 AM      Please note that this chart was generated using voice recognition Dragon dictation software. Although every effort was made to ensure the accuracy of this automatedtranscription, some errors in transcription may have occurred.

## 2020-12-31 DIAGNOSIS — I25.10 CORONARY ARTERY DISEASE INVOLVING NATIVE CORONARY ARTERY OF NATIVE HEART WITHOUT ANGINA PECTORIS: ICD-10-CM

## 2020-12-31 NOTE — TELEPHONE ENCOUNTER
LOV 12/11/2020    Next appt 3/19/2021    Health Maintenance   Topic Date Due    Shingles Vaccine (1 of 2) 02/25/2008    DTaP/Tdap/Td vaccine (1 - Tdap) 09/04/2021 (Originally 2/25/1979)    Lipid screen  09/04/2021    Potassium monitoring  09/04/2021    Creatinine monitoring  09/04/2021    Low dose CT lung screening  09/10/2021    A1C test (Diabetic or Prediabetic)  12/11/2021    Colon cancer screen colonoscopy  08/11/2027    Flu vaccine  Completed    Pneumococcal 0-64 years Vaccine  Completed    Hepatitis C screen  Completed    HIV screen  Completed    Hepatitis A vaccine  Aged Out    Hepatitis B vaccine  Aged Out    Hib vaccine  Aged Out    Meningococcal (ACWY) vaccine  Aged Out             (applicable per patient's age: Cancer Screenings, Depression Screening, Fall Risk Screening, Immunizations)    Hemoglobin A1C (%)   Date Value   12/11/2020 5.9   09/04/2020 6.8 (H)   09/05/2019 6.3 (H)     LDL Cholesterol (mg/dL)   Date Value   09/04/2020 127     AST (U/L)   Date Value   09/04/2020 23     ALT (U/L)   Date Value   09/04/2020 27     BUN (mg/dL)   Date Value   09/04/2020 33 (H)      (goal A1C is < 7)   (goal LDL is <100) need 30-50% reduction from baseline     BP Readings from Last 3 Encounters:   12/11/20 136/82   10/09/20 139/89   09/04/20 128/82    (goal /80)      All Future Testing planned in [facility]:  Lab Frequency Next Occurrence   CT Lung Screen (Annual) Once 09/10/2020   H.  Pylori Breath Test Once 12/11/2020       Next Visit Date:  Future Appointments   Date Time Provider Spike Carl   3/19/2021  9:45 AM Pan Bobby MD [facility]   4/9/2021  1:00 PM Gerald Cormier DO Resp [address]            Patient Active Problem List:     Laryngeal cancer [facility])     Osteoarthritis     Heart burn     Colon polyp     COPD (chronic obstructive pulmonary disease) ([address]     Headache     Neck pain     HTN (hypertension)     Atypical chest pain     Obstructive sleep apnea Vocal cord polyp     Reflux laryngitis     Dysphagia     Coronary artery disease     High cholesterol     Chronic neck pain     Chronic headache     Hip arthritis     Chronic intermittent hypoxia with obstructive sleep apnea     Impaired fasting glucose     Leukoplakia of vocal cords     Colitis     Hepatic steatosis     S/P colonoscopy with polypectomy     Rectal polyp     Adenomatous polyp     Mixed hyperlipidemia     Prediabetes

## 2021-01-05 RX ORDER — ASPIRIN 81 MG/1
TABLET ORAL
Qty: 90 TABLET | Refills: 0 | Status: SHIPPED | OUTPATIENT
Start: 2021-01-05 | End: 2021-03-19 | Stop reason: SDUPTHER

## 2021-01-28 RX ORDER — ATORVASTATIN CALCIUM 20 MG/1
40 TABLET, FILM COATED ORAL DAILY
Qty: 180 TABLET | Refills: 1 | Status: SHIPPED | OUTPATIENT
Start: 2021-01-28 | End: 2021-03-19 | Stop reason: SDUPTHER

## 2021-01-28 NOTE — TELEPHONE ENCOUNTER
Last OV 12/11/20  Health Maintenance   Topic Date Due    Shingles Vaccine (1 of 2) 02/25/2008    DTaP/Tdap/Td vaccine (1 - Tdap) 09/04/2021 (Originally [birth year]25/1979)    Lipid screen  09/04/2021    Potassium monitoring  09/04/2021    Creatinine monitoring  09/04/2021    Low dose CT lung screening  09/10/2021    A1C test (Diabetic or Prediabetic)  12/11/2021    Colon cancer screen colonoscopy  08/11/2027    Flu vaccine  Completed    Pneumococcal 0-64 years Vaccine  Completed    Hepatitis C screen  Completed    HIV screen  Completed    Hepatitis A vaccine  Aged Out    Hepatitis B vaccine  Aged Out    Hib vaccine  Aged Out    Meningococcal (ACWY) vaccine  Aged Out             (applicable per patient's age: Cancer Screenings, Depression Screening, Fall Risk Screening, Immunizations)    Hemoglobin A1C (%)   Date Value   12/11/2020 5.9   09/04/2020 6.8 (H)   09/05/2019 6.3 (H)     LDL Cholesterol (mg/dL)   Date Value   09/04/2020 127     AST (U/L)   Date Value   09/04/2020 23     ALT (U/L)   Date Value   09/04/2020 27     BUN (mg/dL)   Date Value   09/04/2020 33 (H)      (goal A1C is < 7)   (goal LDL is <100) need 30-50% reduction from baseline     BP Readings from Last 3 Encounters:   12/11/20 136/82   10/09/20 139/89   09/04/20 128/82    (goal /80)      All Future Testing planned in [facility]:  Lab Frequency Next Occurrence   CT Lung Screen (Annual) Once 09/10/2020   H.  Pylori Breath Test Once 12/11/2020       Next Visit Date:  Future Appointments   Date Time Provider Spike Carl   3/19/2021  9:45 AM Zbigniew Troncoso MD [facility]   4/9/2021  1:00 PM Jv Gomez DO Resp [address]            Patient Active Problem List:     Laryngeal cancer [facility])     Osteoarthritis     Heart burn     Colon polyp     COPD (chronic obstructive pulmonary disease) ([facility] [address]     Headache     Neck pain     HTN (hypertension)     Atypical chest pain     Obstructive sleep apnea     Vocal cord polyp Reflux laryngitis     Dysphagia     Coronary artery disease     High cholesterol     Chronic neck pain     Chronic headache     Hip arthritis     Chronic intermittent hypoxia with obstructive sleep apnea     Impaired fasting glucose     Leukoplakia of vocal cords     Colitis     Hepatic steatosis     S/P colonoscopy with polypectomy     Rectal polyp     Adenomatous polyp     Mixed hyperlipidemia     Prediabetes

## 2021-01-29 DIAGNOSIS — K21.9 REFLUX LARYNGITIS: ICD-10-CM

## 2021-01-29 DIAGNOSIS — J04.0 REFLUX LARYNGITIS: ICD-10-CM

## 2021-01-29 RX ORDER — PANTOPRAZOLE SODIUM 40 MG/1
40 TABLET, DELAYED RELEASE ORAL DAILY
Qty: 90 TABLET | Refills: 1 | Status: SHIPPED | OUTPATIENT
Start: 2021-01-29 | End: 2021-12-08

## 2021-02-03 ENCOUNTER — TELEPHONE (OUTPATIENT)
Dept: PRIMARY CARE CLINIC | Age: 61
End: 2021-02-03

## 2021-02-03 DIAGNOSIS — J06.9 URTI (ACUTE UPPER RESPIRATORY INFECTION): Primary | ICD-10-CM

## 2021-02-03 NOTE — TELEPHONE ENCOUNTER
Patient calling stating his most recent script for atorvastatin that was sent says to take 2 tablets by mouth daily, he previously was only taking 1 tablet daily. Please advise correct dose patient should be taking.

## 2021-02-04 RX ORDER — AZITHROMYCIN 250 MG/1
TABLET, FILM COATED ORAL
Qty: 6 TABLET | Refills: 0 | Status: SHIPPED | OUTPATIENT
Start: 2021-02-04 | End: 2021-02-14

## 2021-02-04 NOTE — TELEPHONE ENCOUNTER
Patient doesn't understand why he has to take 40mg now when he has always been on the 20mg daily. Please advise. He is also asking for a Zpack, suffering from a cold right now, slight cough and mucus he states. Please advise on this as well.

## 2021-02-04 NOTE — TELEPHONE ENCOUNTER
Because it is correct intensity for hyperlipiemia but if he is not comfortable taking 40 he can go back to 20 mg as he is always been taking no issues with that.   Sent a Z-Carroll to pharmacy

## 2021-03-19 ENCOUNTER — OFFICE VISIT (OUTPATIENT)
Dept: PRIMARY CARE CLINIC | Age: 61
End: 2021-03-19
Payer: COMMERCIAL

## 2021-03-19 VITALS
HEART RATE: 85 BPM | WEIGHT: 233.6 LBS | BODY MASS INDEX: 35.52 KG/M2 | DIASTOLIC BLOOD PRESSURE: 98 MMHG | RESPIRATION RATE: 20 BRPM | SYSTOLIC BLOOD PRESSURE: 142 MMHG | OXYGEN SATURATION: 86 %

## 2021-03-19 DIAGNOSIS — E78.00 HIGH CHOLESTEROL: ICD-10-CM

## 2021-03-19 DIAGNOSIS — J44.9 CHRONIC OBSTRUCTIVE PULMONARY DISEASE, UNSPECIFIED COPD TYPE (HCC): ICD-10-CM

## 2021-03-19 DIAGNOSIS — I10 ESSENTIAL HYPERTENSION: Primary | ICD-10-CM

## 2021-03-19 DIAGNOSIS — I25.10 CORONARY ARTERY DISEASE INVOLVING NATIVE CORONARY ARTERY OF NATIVE HEART WITHOUT ANGINA PECTORIS: ICD-10-CM

## 2021-03-19 PROCEDURE — G8926 SPIRO NO PERF OR DOC: HCPCS | Performed by: INTERNAL MEDICINE

## 2021-03-19 PROCEDURE — G8427 DOCREV CUR MEDS BY ELIG CLIN: HCPCS | Performed by: INTERNAL MEDICINE

## 2021-03-19 PROCEDURE — G8482 FLU IMMUNIZE ORDER/ADMIN: HCPCS | Performed by: INTERNAL MEDICINE

## 2021-03-19 PROCEDURE — 3017F COLORECTAL CA SCREEN DOC REV: CPT | Performed by: INTERNAL MEDICINE

## 2021-03-19 PROCEDURE — 1036F TOBACCO NON-USER: CPT | Performed by: INTERNAL MEDICINE

## 2021-03-19 PROCEDURE — 3023F SPIROM DOC REV: CPT | Performed by: INTERNAL MEDICINE

## 2021-03-19 PROCEDURE — 99214 OFFICE O/P EST MOD 30 MIN: CPT | Performed by: INTERNAL MEDICINE

## 2021-03-19 PROCEDURE — G8417 CALC BMI ABV UP PARAM F/U: HCPCS | Performed by: INTERNAL MEDICINE

## 2021-03-19 RX ORDER — ATORVASTATIN CALCIUM 40 MG/1
40 TABLET, FILM COATED ORAL DAILY
Qty: 90 TABLET | Refills: 1 | Status: SHIPPED | OUTPATIENT
Start: 2021-03-19 | End: 2021-09-30

## 2021-03-19 RX ORDER — ASPIRIN 81 MG/1
TABLET ORAL
Qty: 90 TABLET | Refills: 0 | Status: SHIPPED | OUTPATIENT
Start: 2021-03-19 | End: 2021-06-18 | Stop reason: SDUPTHER

## 2021-03-19 RX ORDER — OXYCODONE HYDROCHLORIDE AND ACETAMINOPHEN 5; 325 MG/1; MG/1
TABLET ORAL
COMMUNITY
Start: 2020-12-11 | End: 2021-04-09 | Stop reason: SDUPTHER

## 2021-03-19 RX ORDER — LISINOPRIL 40 MG/1
TABLET ORAL
Qty: 90 TABLET | Refills: 1 | Status: SHIPPED | OUTPATIENT
Start: 2021-03-19 | End: 2021-05-13 | Stop reason: ALTCHOICE

## 2021-03-19 RX ORDER — AMLODIPINE BESYLATE 5 MG/1
5 TABLET ORAL DAILY
Qty: 90 TABLET | Refills: 0 | Status: SHIPPED | OUTPATIENT
Start: 2021-03-19 | End: 2021-04-29 | Stop reason: SDUPTHER

## 2021-03-19 ASSESSMENT — PATIENT HEALTH QUESTIONNAIRE - PHQ9
SUM OF ALL RESPONSES TO PHQ9 QUESTIONS 1 & 2: 0
2. FEELING DOWN, DEPRESSED OR HOPELESS: 0
SUM OF ALL RESPONSES TO PHQ QUESTIONS 1-9: 0
SUM OF ALL RESPONSES TO PHQ QUESTIONS 1-9: 0
1. LITTLE INTEREST OR PLEASURE IN DOING THINGS: 0

## 2021-03-20 ENCOUNTER — HOSPITAL ENCOUNTER (EMERGENCY)
Age: 61
Discharge: HOME | End: 2021-03-20
Attending: EMERGENCY MEDICINE
Payer: COMMERCIAL

## 2021-03-20 ENCOUNTER — APPOINTMENT (OUTPATIENT)
Dept: CT IMAGING | Age: 61
End: 2021-03-20
Payer: COMMERCIAL

## 2021-03-20 VITALS
BODY MASS INDEX: 35.31 KG/M2 | RESPIRATION RATE: 20 BRPM | SYSTOLIC BLOOD PRESSURE: 122 MMHG | WEIGHT: 233 LBS | HEART RATE: 93 BPM | HEIGHT: 68 IN | DIASTOLIC BLOOD PRESSURE: 81 MMHG | OXYGEN SATURATION: 92 % | TEMPERATURE: 98.7 F

## 2021-03-20 DIAGNOSIS — I10 ESSENTIAL HYPERTENSION: ICD-10-CM

## 2021-03-20 DIAGNOSIS — R51.9 NONINTRACTABLE EPISODIC HEADACHE, UNSPECIFIED HEADACHE TYPE: Primary | ICD-10-CM

## 2021-03-20 PROCEDURE — 96372 THER/PROPH/DIAG INJ SC/IM: CPT

## 2021-03-20 PROCEDURE — 6360000002 HC RX W HCPCS: Performed by: EMERGENCY MEDICINE

## 2021-03-20 PROCEDURE — 70450 CT HEAD/BRAIN W/O DYE: CPT

## 2021-03-20 PROCEDURE — 99283 EMERGENCY DEPT VISIT LOW MDM: CPT

## 2021-03-20 RX ORDER — HYDROCODONE BITARTRATE AND ACETAMINOPHEN 5; 325 MG/1; MG/1
1 TABLET ORAL EVERY 6 HOURS PRN
Qty: 9 TABLET | Refills: 0 | Status: SHIPPED | OUTPATIENT
Start: 2021-03-20 | End: 2021-03-23

## 2021-03-20 RX ORDER — MORPHINE SULFATE 10 MG/ML
10 INJECTION, SOLUTION INTRAMUSCULAR; INTRAVENOUS ONCE
Status: COMPLETED | OUTPATIENT
Start: 2021-03-20 | End: 2021-03-20

## 2021-03-20 RX ADMIN — MORPHINE SULFATE 10 MG: 10 INJECTION INTRAVENOUS at 18:50

## 2021-03-20 ASSESSMENT — PAIN DESCRIPTION - ORIENTATION: ORIENTATION: RIGHT

## 2021-03-20 ASSESSMENT — PAIN DESCRIPTION - LOCATION: LOCATION: HEAD

## 2021-03-20 ASSESSMENT — PAIN DESCRIPTION - PAIN TYPE: TYPE: ACUTE PAIN

## 2021-03-20 NOTE — ED PROVIDER NOTES
[address]      Pt Name: Aggie Perez  MRN: [id number]  Armsdominguezgfmavis 1960  Date of evaluation: 3/20/2021      CHIEF COMPLAINT       Chief Complaint   Patient presents with    Headache     for about 2 weeks, saw his family doctor yesterday, right occipital area throbbing pain, certain positions make it better         HISTORY OF PRESENT ILLNESS      The patient presents with a headache off and on for 2 weeks. He saw his doctor yesterday who told him he needed to get back on his other medication for blood pressure that he was no longer taking. He was taking lisinopril, but had stopped taking amlodipine on his own. The patient says the headache seems to coincide with his blood pressure being elevated. He is concerned and wanted to be evaluated. He says the pain is always in the back of his head on the right side. It seems to be there seems to be a little bit of tightness of the musculature and it seems to be almost tension type headache. He denies vision change. He denies focal weakness or numbness. He denies fever. He denies trauma. Nothing makes his symptoms better or worse otherwise. REVIEW OF SYSTEMS       All systems reviewed and negative unless noted in HPI. The patient denies fever or constitutional symptoms. Denies vision change. Denies any sore throat or rhinorrhea. Denies any neck pain or stiffness. Denies chest pain or shortness of breath. History of COPD. No nausea,  vomiting or diarrhea. Denies any dysuria. Denies urinary frequency or hematuria. Denies musculoskeletal injury or pain. Denies any weakness, numbness or focal neurologic deficit. Headache as noted in HPI. Denies any skin rash or edema. No recent psychiatric issues. No easy bruising or bleeding. Denies any polyuria, polydypsia or history of immunocompromise.        PAST MEDICAL HISTORY    has a past medical history of Chronic intermittent hypoxia with obstructive sleep apnea, COPD (chronic obstructive pulmonary disease) ([facility] [address] Full dentures, Heart burn, Hyperlipidemia, Hypertension, Larynx cancer ([facility] [address] Migraines, On home oxygen therapy, Osteoarthritis, Sleep apnea, and Wears eyeglasses. SURGICAL HISTORY      has a past surgical history that includes MICROLARYNGOSCOPY W BIOPSY; Colonoscopy (); Hip Arthroplasty (Right, 14); Cataract removal (Bilateral); laryngoscopy (10/17/2016); Colonoscopy (2017); and pr colsc flx w/removal lesion by hot bx forceps (N/A, 2017). CURRENT MEDICATIONS       Previous Medications    AMLODIPINE (NORVASC) 5 MG TABLET    Take 1 tablet by mouth daily    ASPIRIN (ASPIRIN LOW DOSE) 81 MG EC TABLET    take 1 tablet by mouth once daily    ATORVASTATIN (LIPITOR) 40 MG TABLET    Take 1 tablet by mouth daily    GUAIFENESIN (MUCINEX) 600 MG EXTENDED RELEASE TABLET    take 1 tablet by mouth twice a day if needed for congestion    HANDICAP PLACARD MISC    by Does not apply route Expires 2025  Diagnosis: COPD    LISINOPRIL (PRINIVIL;ZESTRIL) 40 MG TABLET    take 1 tablet by mouth once daily    OXYCODONE-ACETAMINOPHEN (PERCOCET) 5-325 MG PER TABLET    take 1 tablet by mouth every 6 hours if needed for pain for UP TO. ..  (REFER TO PRESCRIPTION NOTES). PANTOPRAZOLE (PROTONIX) 40 MG TABLET    take 1 tablet by mouth daily       ALLERGIES     has No Known Allergies. FAMILY HISTORY     He indicated that his mother is . He indicated that his father is . He indicated that his sister is alive. He indicated that one of his two brothers is . He indicated that his maternal grandmother is . He indicated that his maternal grandfather is . He indicated that his paternal grandmother is . He indicated that his paternal grandfather is .      family history includes Esophageal Cancer in his brother; [employer] in his brother and mother; Stomach Cancer in his mother. SOCIAL HISTORY      reports that he quit smoking about 7 years ago. His smoking use included cigarettes. He started smoking about 50 years ago. He has a 90.00 pack-year smoking history. He has never used smokeless tobacco. He reports that he does not drink alcohol or use drugs. PHYSICAL EXAM     INITIAL VITALS:  height is 5' 8\" (1.727 m) and weight is 105.7 kg (233 lb). His oral temperature is 98.7 °F (37.1 °C). His blood pressure is 148/99 (abnormal) and his pulse is 93. His respiration is 20 and oxygen saturation is 93%. The patient is alert and oriented, in no apparent distress. HEENT is atraumatic. Pupils are PERRL at 4 mm with normal extraocular motion. Mucous membranes moist.    Neck is supple with no lymphadenopathy. No JVD. No meningismus. Heart sounds regular rate and rhythm with no gallops, murmurs, or rubs. Lungs diminished bilat. Abdomen: soft, nontender with no pain to palpation. Musculoskeletal exam shows no evidence of trauma. Normal distal pulses in all extremities. Skin: trace edema in legs bilat. Neurological exam reveals cranial nerves 2 through 12 grossly intact. Patient has equal  and normal deep tendon reflexes. Psychiatric: no hallucinations or suicidal ideation. Lymphatics.:  No lymphadenopathy. DIFFERENTIAL DIAGNOSIS/ MDM:     Hypertension, CVA, hypoxia    DIAGNOSTIC RESULTS       RADIOLOGY:   I reviewed the radiologist interpretations:  CT HEAD WO CONTRAST   Final Result   No acute intracranial abnormality.               CT HEAD WO CONTRAST (Final result)  Result time 03/20/21 18:35:28  Final result by Jose Juan Torres MD (03/20/21 18:35:28)                Impression:    No acute intracranial abnormality.              Narrative:    EXAMINATION:   CT OF THE HEAD WITHOUT CONTRAST  3/20/2021 6:12 pm     TECHNIQUE:   CT of the head was performed without the administration of intravenous   contrast. Dose modulation, iterative reconstruction, and/or weight based   adjustment of the mA/kV was utilized to reduce the radiation dose to as low   as reasonably achievable. COMPARISON:   None. HISTORY:   ORDERING SYSTEM PROVIDED HISTORY: headache   TECHNOLOGIST PROVIDED HISTORY:     headache   Decision Support Exception->Emergency Medical Condition (MA)   Reason for Exam: Posterior headache per 2 weeks   Acuity: Acute   Type of Exam: Initial     FINDINGS:   BRAIN/VENTRICLES: There is no acute intracranial hemorrhage, mass effect or   midline shift.  No abnormal extra-axial fluid collection.  The gray-white   differentiation is maintained without evidence of an acute infarct.  There is   no evidence of hydrocephalus. ORBITS: The visualized portion of the orbits demonstrate no acute abnormality. SINUSES: The visualized paranasal sinuses and mastoid air cells demonstrate   no acute abnormality. SOFT TISSUES/SKULL:  No acute abnormality of the visualized skull or soft   tissues.                       EMERGENCY DEPARTMENT COURSE:   Vitals:    Vitals:    03/20/21 1726 03/20/21 1730   BP: (!) 181/95 (!) 148/99   Pulse: 93    Resp: 20    Temp: 98.7 °F (37.1 °C)    TempSrc: Oral    SpO2: (!) 80% 93%   Weight: 105.7 kg (233 lb)    Height: 5' 8\" (1.727 m)      -------------------------  BP: (!) 148/99, Temp: 98.7 °F (37.1 °C), Pulse: 93, Resp: 20      Re-evaluation Notes    The patient's oxygen saturation was low when he arrived because he supposed to be on oxygen when he exerts himself. We put him on nasal cannula oxygen and his sat has been fine. He has a history of COPD. The patient's headaches seem to coincide with his hypertension. He is asked to continue his blood pressure medicine. I see no evidence of CVA. I will write him for some medication for pain in the short-term. He is discharged in good condition. Controlled Substance Monitoring:    Acute and Chronic Pain Monitoring:   No flowsheet data found.           FINAL IMPRESSION      1. Nonintractable episodic headache, unspecified headache type    2. Essential hypertension          DISPOSITION/PLAN   DISPOSITION Decision To Discharge 03/20/2021 07:03:05 PM      Condition on Disposition    good    PATIENT REFERRED TO:  María Goncalves MD  [address] [address]  [phone]    In 3 days        DISCHARGE MEDICATIONS:  New Prescriptions    HYDROCODONE-ACETAMINOPHEN (NORCO) 5-325 MG PER TABLET    Take 1 tablet by mouth every 6 hours as needed for Pain for up to 3 days.        (Please note that portions of this note were completed with a voice recognition program.  Efforts were made to edit the dictations but occasionally words are mis-transcribed.)    Caballero MD   Attending Emergency Physician         Blane Rockwell MD  03/20/21 0885

## 2021-03-20 NOTE — ED NOTES
Patient provided with discharge instructions, prescriptions, and follow up information. Verbalized understanding. No IV access to discontinue. A&OX3. Steady gait noted at discharge. Wheelchair declined by patient.       Tessa Russell RN  03/20/21 [id number]

## 2021-03-22 ENCOUNTER — CARE COORDINATION (OUTPATIENT)
Dept: CARE COORDINATION | Age: 61
End: 2021-03-22

## 2021-03-26 ENCOUNTER — TELEPHONE (OUTPATIENT)
Dept: PRIMARY CARE CLINIC | Age: 61
End: 2021-03-26

## 2021-03-26 ASSESSMENT — ENCOUNTER SYMPTOMS
SHORTNESS OF BREATH: 0
CONSTIPATION: 0
BACK PAIN: 0
NAUSEA: 0
VOMITING: 0
SORE THROAT: 0
ABDOMINAL DISTENTION: 0
COUGH: 0
SINUS PRESSURE: 0
WHEEZING: 0
ABDOMINAL PAIN: 0

## 2021-03-26 NOTE — TELEPHONE ENCOUNTER
Patient called requesting Rx for wrist BP cuff. Would like faxed to [phone].  Equipment ANG#[phone]

## 2021-03-26 NOTE — PROGRESS NOTES
[address] PRIMARY CARE  [address] [facility] [address]  [address] [address]  Dept: [phone]  Dept Fax: [phone]    Iza Infante is a 59 y.o. male who presents today for his medical conditions/complaints as noted below. Chief Complaint   Patient presents with    Hypertension     3 mo f/u    Headache       HPI:     This is a 42-year-old male who is here for regular follow-up for essential hypertension, CAD, COPD. Reviewed all his meds and updated the list.  Blood pressure has been uncontrolled in the discussed about restarting his amlodipine. No other complaints or concerns follow-up with ENT for history of laryngeal cancer. Hemoglobin A1C (%)   Date Value   2020 5.9   2020 6.8 (H)   2019 6.3 (H)             ( goal A1C is < 7)   No results found for: LABMICR  LDL Cholesterol (mg/dL)   Date Value   2020 127   2018 126   2017 116       (goal LDL is <100)   AST (U/L)   Date Value   2020 23     ALT (U/L)   Date Value   2020 27     BUN (mg/dL)   Date Value   2020 33 (H)     BP Readings from Last 3 Encounters:   21 122/81   21 (!) 142/98   20 136/82          (goal 120/80)    Past Medical History:   Diagnosis Date    Chronic intermittent hypoxia with obstructive sleep apnea     COPD (chronic obstructive pulmonary disease) (HCC)     Full dentures     Upper & Lower    Heart burn     GERD    Hyperlipidemia     Hypertension     On Meds    Larynx cancer ([address] 2007    On Rad.  Tx.    Migraines     On home oxygen therapy     2L/NC nightly    Osteoarthritis     rt. hip pain    Sleep apnea 2013    CPAP with Oxygen nightly    Wears eyeglasses       Past Surgical History:   Procedure Laterality Date    CATARACT REMOVAL Bilateral     MULTIPLE MORE ON LT    COLONOSCOPY      5 Polyps removed    COLONOSCOPY  2017    with biopsy    HIP ARTHROPLASTY Right 14    LARYNGOSCOPY 10/17/2016    with biopsies    MICROLARYNGOSCOPY W BIOPSY      MO COLSC FLX W/REMOVAL LESION BY HOT BX FORCEPS N/A 2017    COLONOSCOPY POLYPECTOMY HOT BIOPSY performed by Rama Moran MD at [address] [place] [address] History   Problem Relation Age of Onset    Stomach Cancer Mother     Lung Cancer Mother     Lung Cancer Brother     Esophageal Cancer Brother        Social History     Tobacco Use    Smoking status: Former Smoker     Packs/day: 2.00     Years: 45.00     Pack years: 90.00     Types: Cigarettes     Start date: 10/4/1970     Quit date: 2013     Years since quittin.8    Smokeless tobacco: Never Used    Tobacco comment: quit 3 yrs ago   Substance Use Topics    Alcohol use: No     Alcohol/week: 0.0 standard drinks      Current Outpatient Medications   Medication Sig Dispense Refill    oxyCODONE-acetaminophen (PERCOCET) 5-325 MG per tablet take 1 tablet by mouth every 6 hours if needed for pain for UP TO. ..  (REFER TO PRESCRIPTION NOTES).  aspirin (ASPIRIN LOW DOSE) 81 MG EC tablet take 1 tablet by mouth once daily 90 tablet 0    lisinopril (PRINIVIL;ZESTRIL) 40 MG tablet take 1 tablet by mouth once daily 90 tablet 1    amLODIPine (NORVASC) 5 MG tablet Take 1 tablet by mouth daily (Patient taking differently: Take 10 mg by mouth daily ) 90 tablet 0    atorvastatin (LIPITOR) 40 MG tablet Take 1 tablet by mouth daily 90 tablet 1    pantoprazole (PROTONIX) 40 MG tablet take 1 tablet by mouth daily 90 tablet 1    guaiFENesin (MUCINEX) 600 MG extended release tablet take 1 tablet by mouth twice a day if needed for congestion 60 tablet 0    Handicap Placard MISC by Does not apply route Expires 2025  Diagnosis: COPD 1 each 0     No current facility-administered medications for this visit.       No Known Allergies    Health Maintenance   Topic Date Due    Shingles Vaccine (1 of 2) Never done    DTaP/Tdap/Td vaccine (1 - Tdap) 2021 (Originally 1979)   Alanis Shi COVID-19 Vaccine (2 - Moderna 2-dose series) 04/01/2021    Lipid screen  09/04/2021    Potassium monitoring  09/04/2021    Creatinine monitoring  09/04/2021    Low dose CT lung screening  09/10/2021    A1C test (Diabetic or Prediabetic)  12/11/2021    Colon cancer screen colonoscopy  08/11/2027    Flu vaccine  Completed    Pneumococcal 0-64 years Vaccine  Completed    Hepatitis C screen  Completed    HIV screen  Completed    Hepatitis A vaccine  Aged Out    Hepatitis B vaccine  Aged Out    Hib vaccine  Aged Out    Meningococcal (ACWY) vaccine  Aged Out       Subjective:      Review of Systems   Constitutional: Negative for appetite change, chills, fatigue and fever. HENT: Negative for congestion, sinus pressure, sneezing and sore throat. Eyes: Negative for visual disturbance. Respiratory: Negative for cough, shortness of breath and wheezing. Cardiovascular: Negative for chest pain and palpitations. Gastrointestinal: Negative for abdominal distention, abdominal pain, constipation, nausea and vomiting. Endocrine: Negative for cold intolerance, heat intolerance, polydipsia, polyphagia and polyuria. Genitourinary: Negative for decreased urine volume, difficulty urinating and urgency. Musculoskeletal: Negative for back pain and joint swelling. Skin: Negative for rash. Neurological: Negative for dizziness and headaches. Psychiatric/Behavioral: Negative for sleep disturbance. The patient is not nervous/anxious. All other systems reviewed and are negative. Objective:     Physical Exam  Vitals signs and nursing note reviewed. Constitutional:       General: He is not in acute distress. Appearance: Normal appearance. He is well-developed. He is not diaphoretic. HENT:      Head: Normocephalic and atraumatic. Right Ear: Hearing normal.      Left Ear: Hearing normal.      Mouth/Throat:      Pharynx: Uvula midline. Eyes:      General: No scleral icterus. Conjunctiva/sclera: Conjunctivae normal.      Pupils: Pupils are equal, round, and reactive to light. Neck:      Musculoskeletal: Full passive range of motion without pain and normal range of motion. Thyroid: No thyromegaly. Vascular: No JVD. Trachea: Phonation normal.   Cardiovascular:      Rate and Rhythm: Normal rate and regular rhythm. Pulses: Normal pulses. Carotid pulses are 2+ on the right side and 2+ on the left side. Radial pulses are 2+ on the right side and 2+ on the left side. Heart sounds: Normal heart sounds. No murmur. Pulmonary:      Effort: Pulmonary effort is normal. No accessory muscle usage or respiratory distress. Breath sounds: Normal breath sounds. No wheezing or rales. Abdominal:      General: Bowel sounds are normal. There is no distension. Palpations: Abdomen is soft. Tenderness: There is no abdominal tenderness. There is no rebound. Musculoskeletal: Normal range of motion. General: No deformity. Lymphadenopathy:      Cervical: No cervical adenopathy. Skin:     General: Skin is warm. Capillary Refill: Capillary refill takes less than 2 seconds. Findings: No rash. Nails: There is no clubbing. Neurological:      Mental Status: He is alert and oriented to person, place, and time. Sensory: No sensory deficit. Psychiatric:         Speech: Speech normal.         Behavior: Behavior normal.       BP (!) 142/98 (Site: Left Upper Arm, Position: Sitting)   Pulse 85   Resp 20   Wt 233 lb 9.6 oz (106 kg)   SpO2 (!) 86%   BMI 35.52 kg/m²     Assessment:          1. Essential hypertension    - lisinopril (PRINIVIL;ZESTRIL) 40 MG tablet; take 1 tablet by mouth once daily  Dispense: 90 tablet; Refill: 1  - amLODIPine (NORVASC) 5 MG tablet; Take 1 tablet by mouth daily (Patient taking differently: Take 10 mg by mouth daily )  Dispense: 90 tablet; Refill: 0    2.  Coronary artery disease involving native coronary artery of native heart without angina pectoris    - aspirin (ASPIRIN LOW DOSE) 81 MG EC tablet; take 1 tablet by mouth once daily  Dispense: 90 tablet; Refill: 0    3. Chronic obstructive pulmonary disease, unspecified COPD type ([facility]      4. High cholesterol    - atorvastatin (LIPITOR) 40 MG tablet; Take 1 tablet by mouth daily  Dispense: 90 tablet; Refill: 1            Diagnosis Orders   1. Essential hypertension  lisinopril (PRINIVIL;ZESTRIL) 40 MG tablet    amLODIPine (NORVASC) 5 MG tablet   2. Coronary artery disease involving native coronary artery of native heart without angina pectoris  aspirin (ASPIRIN LOW DOSE) 81 MG EC tablet   3. Chronic obstructive pulmonary disease, unspecified COPD type ([facility]     4. High cholesterol  atorvastatin (LIPITOR) 40 MG tablet           Plan:      No follow-ups on file. No orders of the defined types were placed in this encounter. Orders Placed This Encounter   Medications    aspirin (ASPIRIN LOW DOSE) 81 MG EC tablet     Sig: take 1 tablet by mouth once daily     Dispense:  90 tablet     Refill:  0    lisinopril (PRINIVIL;ZESTRIL) 40 MG tablet     Sig: take 1 tablet by mouth once daily     Dispense:  90 tablet     Refill:  1    amLODIPine (NORVASC) 5 MG tablet     Sig: Take 1 tablet by mouth daily     Dispense:  90 tablet     Refill:  0    atorvastatin (LIPITOR) 40 MG tablet     Sig: Take 1 tablet by mouth daily     Dispense:  90 tablet     Refill:  1         Patient given educational materials - see patient instructions. Discussed use, benefit, and side effects of prescribedmedications. All patient questions answered. Pt voiced understanding. Reviewed health maintenance. Instructed to continue current medications, diet and exercise. Patient agreed with treatment plan. Follow up as directed. I spent a total of 25 minutes face to face with this patient. Over 50% of that time was spent on counseling and care coordination.   Please see assessment and plan for details. Electronically signed by María Goncalves MD on 3/26/2021 at 1:41 PM      Please note that this chart was generated using voice recognition Dragon dictation software. Although every effort was made to ensure the accuracy of this automatedtranscription, some errors in transcription may have occurred.

## 2021-04-09 ENCOUNTER — OFFICE VISIT (OUTPATIENT)
Dept: PULMONOLOGY | Age: 61
End: 2021-04-09
Payer: COMMERCIAL

## 2021-04-09 VITALS
OXYGEN SATURATION: 91 % | SYSTOLIC BLOOD PRESSURE: 125 MMHG | RESPIRATION RATE: 15 BRPM | HEIGHT: 68 IN | DIASTOLIC BLOOD PRESSURE: 80 MMHG | BODY MASS INDEX: 33.95 KG/M2 | WEIGHT: 224 LBS | HEART RATE: 81 BPM

## 2021-04-09 DIAGNOSIS — J44.9 STAGE 4 VERY SEVERE COPD BY GOLD CLASSIFICATION (HCC): Primary | ICD-10-CM

## 2021-04-09 DIAGNOSIS — G44.1 OTHER VASCULAR HEADACHE: ICD-10-CM

## 2021-04-09 DIAGNOSIS — G47.33 OSA ON CPAP: ICD-10-CM

## 2021-04-09 DIAGNOSIS — J96.11 CHRONIC HYPOXEMIC RESPIRATORY FAILURE (HCC): ICD-10-CM

## 2021-04-09 DIAGNOSIS — Z99.89 OSA ON CPAP: ICD-10-CM

## 2021-04-09 PROCEDURE — 3017F COLORECTAL CA SCREEN DOC REV: CPT | Performed by: INTERNAL MEDICINE

## 2021-04-09 PROCEDURE — 1036F TOBACCO NON-USER: CPT | Performed by: INTERNAL MEDICINE

## 2021-04-09 PROCEDURE — G8417 CALC BMI ABV UP PARAM F/U: HCPCS | Performed by: INTERNAL MEDICINE

## 2021-04-09 PROCEDURE — G8427 DOCREV CUR MEDS BY ELIG CLIN: HCPCS | Performed by: INTERNAL MEDICINE

## 2021-04-09 PROCEDURE — 99214 OFFICE O/P EST MOD 30 MIN: CPT | Performed by: INTERNAL MEDICINE

## 2021-04-09 PROCEDURE — G8926 SPIRO NO PERF OR DOC: HCPCS | Performed by: INTERNAL MEDICINE

## 2021-04-09 PROCEDURE — 3023F SPIROM DOC REV: CPT | Performed by: INTERNAL MEDICINE

## 2021-04-09 RX ORDER — OXYCODONE HYDROCHLORIDE AND ACETAMINOPHEN 5; 325 MG/1; MG/1
1 TABLET ORAL EVERY 6 HOURS PRN
Qty: 12 TABLET | Refills: 0 | Status: SHIPPED | OUTPATIENT
Start: 2021-04-09 | End: 2021-04-12

## 2021-04-09 ASSESSMENT — SLEEP AND FATIGUE QUESTIONNAIRES
HOW LIKELY ARE YOU TO NOD OFF OR FALL ASLEEP WHILE SITTING AND TALKING TO SOMEONE: 0
HOW LIKELY ARE YOU TO NOD OFF OR FALL ASLEEP WHILE SITTING QUIETLY AFTER LUNCH WITHOUT ALCOHOL: 0
HOW LIKELY ARE YOU TO NOD OFF OR FALL ASLEEP IN A CAR, WHILE STOPPED FOR A FEW MINUTES IN TRAFFIC: 0
HOW LIKELY ARE YOU TO NOD OFF OR FALL ASLEEP WHILE WATCHING TV: 3
ESS TOTAL SCORE: 9
HOW LIKELY ARE YOU TO NOD OFF OR FALL ASLEEP WHILE LYING DOWN TO REST IN THE AFTERNOON WHEN CIRCUMSTANCES PERMIT: 3

## 2021-04-09 ASSESSMENT — ENCOUNTER SYMPTOMS
CHEST TIGHTNESS: 1
SHORTNESS OF BREATH: 1
WHEEZING: 1
EYES NEGATIVE: 1
BACK PAIN: 1

## 2021-04-09 NOTE — PROGRESS NOTES
Subjective:      Patient ID: Washington Garzon is a 59 y.o. male. HPI  Follow-up visit for sleep apnea and stage III COPD. Since his last visit 6 months ago he states that his shortness of breath is worse. He also reports exercise-induced hypoxemia. He states that his concentrator at home he uses 3 to 5 L. He states that when he gets up and walks he desaturates down into the 80s. Generally rebounds with rest.  Situation even worse with portable concentrator. States that he can keep his oxygen saturations above 90%. Patient believes he developed headaches because of hypoxemia. States that when his oxygen saturations are low he develops a throbbing which he localizes to the right occipital region. In fact, he was in the emergency department about a month ago at [facility] where he had a CT of his brain done which was negative. I reviewed the entry. It was felt that his that his headache was due to elevated blood pressure documented on the chart was 148/99. He was given 11 Percocets. He brings along an empty bottle and asks for a refill. He states \"I just use these when it gets really bad. \"  Denies history of headaches or migraines. His last low-dose screening CT scan of the chest done in October was category 1. Patient's include aspirin, lisinopril, amlodipine, Lipitor, Protonix, and Mucinex. Compliance download from 12/31/20203/30/2021 shows 95% compliance for an average of 9 hours and 38 minutes. Cameron on auto titrating CPAP 10-20 cm of water pressure. Median pressure 10.9. AHI 1.5. Believes he is benefiting greatly. Reports refreshing and restorative sleep. Review of Systems   Constitutional: Negative. HENT: Negative. Eyes: Negative. Respiratory: Positive for chest tightness, shortness of breath and wheezing. Cardiovascular: Negative. Musculoskeletal: Positive for back pain. Neurological: Positive for headaches.    All other systems reviewed and are negative. Objective:     Physical Exam  Vitals signs and nursing note reviewed. Constitutional:       Appearance: He is well-developed. He is obese. HENT:      Head: Normocephalic. Eyes:      General: No scleral icterus. Conjunctiva/sclera: Conjunctivae normal.   Neck:      Musculoskeletal: Neck supple. Thyroid: No thyromegaly. Vascular: No JVD. Trachea: No tracheal deviation. Cardiovascular:      Rate and Rhythm: Normal rate and regular rhythm. Heart sounds: Normal heart sounds. No murmur. No gallop. Pulmonary:      Effort: Pulmonary effort is normal. No respiratory distress. Breath sounds: No wheezing or rales. Chest:      Chest wall: No tenderness. Abdominal:      Palpations: Abdomen is soft. Tenderness: There is no abdominal tenderness. Lymphadenopathy:      Cervical: No cervical adenopathy. Skin:     General: Skin is warm and dry. Neurological:      Mental Status: He is alert and oriented to person, place, and time. Wt Readings from Last 3 Encounters:   04/09/21 224 lb (101.6 kg)   03/20/21 233 lb (105.7 kg)   03/19/21 233 lb 9.6 oz (106 kg)          Results for orders placed or performed in visit on 12/11/20   POCT glycosylated hemoglobin (Hb A1C)   Result Value Ref Range    Hemoglobin A1C 5.9 %       Assessment:         1. Stage 4 very severe COPD by GOLD classification ([address]    2. SHAMAR on CPAP    3. Chronic hypoxemic respiratory failure (HCC)    4. Other vascular headache           Plan:      1. 6-minute walk test to titrate oxygen. Patient may need a high capacity concentrator or continuous flow. Etiology of increased oxygen needs not completely clear nor correlation between hypoxemia and headache. 2. Chest x-ray PA and lateral.  Call patient with results. 3. Percocet dispense #12 no refills. I advised the patient that we will no longer be dispensing narcotic pain medication.   Moreover, this medication is only to be used for episodes of

## 2021-04-13 ENCOUNTER — OFFICE VISIT (OUTPATIENT)
Dept: PULMONOLOGY | Age: 61
End: 2021-04-13
Payer: COMMERCIAL

## 2021-04-13 VITALS — OXYGEN SATURATION: 84 % | HEART RATE: 81 BPM | HEIGHT: 68 IN | BODY MASS INDEX: 33.95 KG/M2 | WEIGHT: 224 LBS

## 2021-04-13 DIAGNOSIS — J44.9 STAGE 4 VERY SEVERE COPD BY GOLD CLASSIFICATION (HCC): Primary | ICD-10-CM

## 2021-04-13 PROCEDURE — 94618 PULMONARY STRESS TESTING: CPT | Performed by: INTERNAL MEDICINE

## 2021-04-13 NOTE — PROGRESS NOTES
[facility]  [facility], [facility].  [facility] [id number]    Oximetry Report  Testing Date: 04/13/21      Name: Ivone Mustafa    Age: 59 y.o. Gender: male   Diagnosis: No diagnosis found. Referring Physician: Brooke Curry   Weight: 224 lb                                                   Height: 68 in    Smoke HX:  reports that he quit smoking about 7 years ago. His smoking use included cigarettes. He started smoking about 50 years ago. He has a 90.00 pack-year smoking history. He has never used smokeless tobacco.                                                            Max - Target  Heart Rate 183 / 146  Inspired O2 SP02% Max Heart Rate Assist Device Activity Pace Distance Walked (ft) Time (min.)   R/A 84 81  Rest      R/A          N/C-2 86 95  Walk  Normal  250 1   N/C-3 88 101  Walk  Normal  500 2   N/C-5 87 109  Walk  Normal  700   3   R/A= Roomed Air, NC = Oxygen by Nasal Cannula    Distance Walk Predicted Distance LLN** Predicted % Duration (min) Duration of Stops Sec Leonie Dyspnea Leonie Fatigue   1450 1637 1135 88 6 30 sec 2 2   **LLN= Lower limits of normal **LLN= Lower limits of normal  (from [place] [address], [facility] of Respiratory and Critical Care Medicine;[id number])       Comments: The patient walked for 6 minutes at a slow normal pace. Per Dr. Vanesa Maza, wanted to see if patient would be better with a continuous flow rather then a pulse dose setting for patients oxygen. Patient on 5 pulse dose was 87% and patient on 5L continuous was also 87%. Patient is already set up with oxygen and has a POC. Per Dr. Vanesa Maza to keep patient with POC. He  did not exhibit signs of dyspnea and did not complain.

## 2021-04-29 ENCOUNTER — TELEPHONE (OUTPATIENT)
Dept: PRIMARY CARE CLINIC | Age: 61
End: 2021-04-29

## 2021-04-29 ENCOUNTER — OFFICE VISIT (OUTPATIENT)
Dept: PRIMARY CARE CLINIC | Age: 61
End: 2021-04-29
Payer: COMMERCIAL

## 2021-04-29 VITALS
HEART RATE: 78 BPM | BODY MASS INDEX: 34.97 KG/M2 | SYSTOLIC BLOOD PRESSURE: 158 MMHG | DIASTOLIC BLOOD PRESSURE: 86 MMHG | RESPIRATION RATE: 18 BRPM | OXYGEN SATURATION: 65 % | WEIGHT: 230 LBS

## 2021-04-29 DIAGNOSIS — M16.0 PRIMARY OSTEOARTHRITIS OF BOTH HIPS: ICD-10-CM

## 2021-04-29 DIAGNOSIS — I10 ESSENTIAL HYPERTENSION: Primary | ICD-10-CM

## 2021-04-29 PROCEDURE — 3017F COLORECTAL CA SCREEN DOC REV: CPT | Performed by: INTERNAL MEDICINE

## 2021-04-29 PROCEDURE — 99213 OFFICE O/P EST LOW 20 MIN: CPT | Performed by: INTERNAL MEDICINE

## 2021-04-29 PROCEDURE — G8427 DOCREV CUR MEDS BY ELIG CLIN: HCPCS | Performed by: INTERNAL MEDICINE

## 2021-04-29 PROCEDURE — G8417 CALC BMI ABV UP PARAM F/U: HCPCS | Performed by: INTERNAL MEDICINE

## 2021-04-29 PROCEDURE — 1036F TOBACCO NON-USER: CPT | Performed by: INTERNAL MEDICINE

## 2021-04-29 RX ORDER — OXYCODONE HYDROCHLORIDE AND ACETAMINOPHEN 5; 325 MG/1; MG/1
1 TABLET ORAL EVERY 4 HOURS PRN
Qty: 21 TABLET | Refills: 0 | Status: SHIPPED | OUTPATIENT
Start: 2021-04-29 | End: 2021-04-29

## 2021-04-29 RX ORDER — OXYCODONE HYDROCHLORIDE AND ACETAMINOPHEN 5; 325 MG/1; MG/1
1 TABLET ORAL EVERY 4 HOURS PRN
COMMUNITY
End: 2021-04-29 | Stop reason: SDUPTHER

## 2021-04-29 RX ORDER — OXYCODONE HYDROCHLORIDE AND ACETAMINOPHEN 5; 325 MG/1; MG/1
1 TABLET ORAL EVERY 4 HOURS PRN
Qty: 21 TABLET | Refills: 0 | Status: SHIPPED | OUTPATIENT
Start: 2021-04-29 | End: 2021-06-25 | Stop reason: SDUPTHER

## 2021-04-29 RX ORDER — AMLODIPINE BESYLATE 10 MG/1
10 TABLET ORAL NIGHTLY
Qty: 90 TABLET | Refills: 1 | Status: SHIPPED | OUTPATIENT
Start: 2021-04-29 | End: 2021-09-30 | Stop reason: ALTCHOICE

## 2021-04-29 RX ORDER — LISINOPRIL AND HYDROCHLOROTHIAZIDE 20; 12.5 MG/1; MG/1
2 TABLET ORAL DAILY
Qty: 180 TABLET | Refills: 0 | Status: SHIPPED | OUTPATIENT
Start: 2021-04-29 | End: 2021-07-21 | Stop reason: SDUPTHER

## 2021-04-29 NOTE — TELEPHONE ENCOUNTER
Pt called stating pharmacy received his scripts for lisinopril and amlodipine from today's visit, but they did not receive the script for oxycodone. Please fill to [facility].

## 2021-04-30 ENCOUNTER — TELEPHONE (OUTPATIENT)
Dept: PRIMARY CARE CLINIC | Age: 61
End: 2021-04-30

## 2021-04-30 DIAGNOSIS — I10 ESSENTIAL HYPERTENSION: Primary | ICD-10-CM

## 2021-04-30 RX ORDER — FUROSEMIDE 40 MG/1
40 TABLET ORAL DAILY
Qty: 60 TABLET | Refills: 3 | Status: SHIPPED | OUTPATIENT
Start: 2021-04-30 | End: 2021-12-21 | Stop reason: SDUPTHER

## 2021-04-30 NOTE — TELEPHONE ENCOUNTER
Kindly let the patient know that I am sending a prescription for Lasix and he needs to take it once a day. It is advisable to take it in the morning. Kindly let the patient know to get back with blood pressure readings. We will also place nephrology referral for uncontrolled hypertension if lasix fails to control the BP .

## 2021-04-30 NOTE — TELEPHONE ENCOUNTER
Patient states that he took the amlodipine 10 mg last night and this morning at 7:30a, he took #2 lisinopril-hctz. At 8am , his BP was 144/86 with a pulse of 81 and today at 12:30p, he started to get a headache and took his BP, it was 166/94 with a 92 pulse.

## 2021-05-03 NOTE — TELEPHONE ENCOUNTER
Writer returned patient's call and explained that he did not speak with me this morning. He agrees and says he spoke with someone up at the . He states after he took his BP medication and the lasix, his BP was 117/77 and he does not have a headache. However, he has been experiencing intermittent pain and spasms under his left breast by his rib cage. No pain is shooting down his arm. He feels it is more muscular and says it happens multiple times daily. Patient notified him Dr Rigoberto Valdez is not in the office on Mondays but we will call him back tomorrow. However, if pain increases and he becomes concerned, he was advised to go to the ER .  Patient verbalized understanding

## 2021-05-03 NOTE — TELEPHONE ENCOUNTER
Kindly let the patient know that any left-sided chest pain he needs to go to the ER to rule out acute coronary syndrome and it could masquerade as musculoskeletal chest pain as well.

## 2021-05-03 NOTE — TELEPHONE ENCOUNTER
Patient called back, writer tried to help him but information I gave him from Dr Titi Rodgers states he already spoke to Tay López about it this morning and she must be calling about something else.

## 2021-05-05 ASSESSMENT — ENCOUNTER SYMPTOMS
SINUS PRESSURE: 0
NAUSEA: 0
ABDOMINAL DISTENTION: 0
WHEEZING: 0
SHORTNESS OF BREATH: 0
CONSTIPATION: 0
BACK PAIN: 0
VOMITING: 0
COUGH: 0
ABDOMINAL PAIN: 0
SORE THROAT: 0

## 2021-05-05 NOTE — PROGRESS NOTES
[address] PRIMARY CARE  [address] [facility] [address]  [address] [address]  Dept: [phone]  Dept Fax: [phone]    Tony Carcamo is a 59 y.o. male who presents today for his medical conditions/complaints as noted below. Chief Complaint   Patient presents with    Headache     starts at base of skull and radiates to the front of head daily    Other     discuss medications       HPI:     Is a 49-year-old male who is here for complaints of headache and his blood pressure today is at 158/86 discussed in detail that we need to increase the dose of his medications and also do some changes in his regimen. Patient understands. Need to maintain a blood pressure log. Hemoglobin A1C (%)   Date Value   2020 5.9   2020 6.8 (H)   2019 6.3 (H)             ( goal A1C is < 7)   No results found for: LABMICR  LDL Cholesterol (mg/dL)   Date Value   2020 127   2018 126   2017 116       (goal LDL is <100)   AST (U/L)   Date Value   2020 23     ALT (U/L)   Date Value   2020 27     BUN (mg/dL)   Date Value   2020 33 (H)     BP Readings from Last 3 Encounters:   21 (!) 158/86   21 125/80   21 122/81          (goal 120/80)    Past Medical History:   Diagnosis Date    Adenomatous polyp 8/15/2017    Repeat colonoscopy recommended to patient in 2020.  Chronic headache 2014    Chronic intermittent hypoxia with obstructive sleep apnea     Chronic neck pain 2014    Colitis 2017    CT scan dated May 6, 2017 reviewed.  Colon polyp     COPD (chronic obstructive pulmonary disease) ([facility] [address]     Full dentures     Upper & Lower    Headache 2011    See's neurology.      Heart burn     GERD    Hepatic steatosis 2017    High cholesterol 2014    Hip arthritis 2014    Hyperlipidemia     Hypertension     On Meds    Impaired fasting glucose 12/3/2015    Monitor     Laryngeal cancer [facility])     Consultant: Dr. Gil Castillo dated June 11, 2015 from Dr. Evan Barron reviewed on 07/24/2015, see Media tab of Chart Review for more information. Correspondence dated September 23, 2015 from Dr. Martina Perez reviewed on 9/24/2015, see Media tab of Chart Review for more informatiion Correspondence dated October 4, 2016 from H&P from [facility] reviewed on 10/4/2016, see Media tab of Chart Review     Larynx cancer ([facility] [address] 2007    On Rad. Tx.   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 Martina Perez reviewed on 9/13/16, see Media tab of Chart Review for more information.  Migraines     Mixed hyperlipidemia 11/5/2018    Neck pain 4/16/2012    Obstructive sleep apnea 3/15/2013    Clinical Polysomnography dated January 29, 2013 and February 3, 2013 from Dr. Chris Alvarado reviewed on 03/15/2013 see Media tab of Chart Review for more information. Correspondence dated 4/30/13 from Dr. Addie Parnell reviewed on 05/02/2013, see Media tab of Chart Review for more information.  On home oxygen therapy 2013    2L/NC nightly    Osteoarthritis     rt. hip pain    Prediabetes 9/6/2019    Rectal polyp 8/11/2017    Repeat colonoscopy due on or around August 11, 2022    Reflux laryngitis 5/22/2013    Correspondence dated May 21, 2013 from Dr. Martina Perez reviewed on 05/22/2013, see Media tab of Chart Review for more information. Correspondence dated May 17, 2018 from Dr. Martina Perez reviewed on 5/17/18, see Media tab of Chart Review for more information.  S/P colonoscopy with polypectomy 8/11/2017    Sleep apnea 2013    CPAP with Oxygen nightly    Vocal cord polyp 4/19/2013    Correspondence dated 4/15/13 from Dr. Martina Perez reviewed on 04/19/2013 see Media tab of Chart Review for more information. Correspondence dated 4/23/13 from Dr. Martina Perez reviewed on 04/27/2013 see Media tab of Chart Review for more information.      Wears eyeglasses       Past Surgical History:   Procedure Laterality Date    CATARACT REMOVAL Bilateral     MULTIPLE MORE ON LT    COLONOSCOPY      5 Polyps removed    COLONOSCOPY  2017    with biopsy    HIP ARTHROPLASTY Right 14    LARYNGOSCOPY  10/17/2016    with biopsies    MICROLARYNGOSCOPY W BIOPSY      [place] COLSC FLX W/REMOVAL LESION BY HOT BX FORCEPS N/A 2017    COLONOSCOPY POLYPECTOMY HOT BIOPSY performed by Rudy Calderon MD at [address] [facility] History   Problem Relation Age of Onset    Stomach Cancer Mother     Lung Cancer Mother     Lung Cancer Brother     Esophageal Cancer Brother        Social History     Tobacco Use    Smoking status: Former Smoker     Packs/day: 2.00     Years: 45.00     Pack years: 90.00     Types: Cigarettes     Start date: 10/4/1970     Quit date: 2013     Years since quittin.9    Smokeless tobacco: Never Used    Tobacco comment: quit 3 yrs ago   Substance Use Topics    Alcohol use: No     Alcohol/week: 0.0 standard drinks      Current Outpatient Medications   Medication Sig Dispense Refill    amLODIPine (NORVASC) 10 MG tablet Take 1 tablet by mouth nightly 90 tablet 1    lisinopril-hydroCHLOROthiazide (PRINZIDE;ZESTORETIC) 20-12.5 MG per tablet Take 2 tablets by mouth daily 180 tablet 0    oxyCODONE-acetaminophen (PERCOCET) 5-325 MG per tablet Take 1 tablet by mouth every 4 hours as needed for Pain for up to 7 days.  21 tablet 0    Blood Pressure Monitoring (SPHYGMOMANOMETER) MISC Use it daily as needed 1 each 0    aspirin (ASPIRIN LOW DOSE) 81 MG EC tablet take 1 tablet by mouth once daily 90 tablet 0    lisinopril (PRINIVIL;ZESTRIL) 40 MG tablet take 1 tablet by mouth once daily 90 tablet 1    atorvastatin (LIPITOR) 40 MG tablet Take 1 tablet by mouth daily 90 tablet 1    pantoprazole (PROTONIX) 40 MG tablet take 1 tablet by mouth daily 90 tablet 1    Handicap Placard MISC by Does not apply route Expires 2025  Diagnosis: COPD 1 each 0    furosemide (LASIX) 40 MG tablet Take 1 tablet by mouth daily 60 tablet 3    guaiFENesin (MUCINEX) 600 MG extended release tablet take 1 tablet by mouth twice a day if needed for congestion (Patient not taking: Reported on 4/29/2021) 60 tablet 0     No current facility-administered medications for this visit. No Known Allergies    Health Maintenance   Topic Date Due    Shingles Vaccine (1 of 2) Never done    DTaP/Tdap/Td vaccine (1 - Tdap) 09/04/2021 (Originally 2/25/1979)    Lipid screen  09/04/2021    Potassium monitoring  09/04/2021    Creatinine monitoring  09/04/2021    Low dose CT lung screening  09/10/2021    A1C test (Diabetic or Prediabetic)  12/11/2021    Colon cancer screen colonoscopy  08/11/2027    Flu vaccine  Completed    Pneumococcal 0-64 years Vaccine  Completed    COVID-19 Vaccine  Completed    Hepatitis C screen  Completed    HIV screen  Completed    Hepatitis A vaccine  Aged Out    Hepatitis B vaccine  Aged Out    Hib vaccine  Aged Out    Meningococcal (ACWY) vaccine  Aged Out       Subjective:      Review of Systems   Constitutional: Negative for appetite change, chills, fatigue and fever. HENT: Negative for congestion, sinus pressure, sneezing and sore throat. Eyes: Negative for visual disturbance. Respiratory: Negative for cough, shortness of breath and wheezing. Cardiovascular: Negative for chest pain and palpitations. Gastrointestinal: Negative for abdominal distention, abdominal pain, constipation, nausea and vomiting. Endocrine: Negative for cold intolerance, heat intolerance, polydipsia, polyphagia and polyuria. Genitourinary: Negative for decreased urine volume, difficulty urinating and urgency. Musculoskeletal: Negative for back pain and joint swelling. Skin: Negative for rash. Neurological: Positive for headaches. Negative for dizziness. Psychiatric/Behavioral: Negative for sleep disturbance. The patient is not nervous/anxious. All other systems reviewed and are negative. Objective:     Physical Exam  Vitals signs and nursing note reviewed. Constitutional:       General: He is not in acute distress. Appearance: Normal appearance. He is well-developed. He is not diaphoretic. HENT:      Head: Normocephalic and atraumatic. Right Ear: Hearing normal.      Left Ear: Hearing normal.      Mouth/Throat:      Pharynx: Uvula midline. Eyes:      General: No scleral icterus. Conjunctiva/sclera: Conjunctivae normal.      Pupils: Pupils are equal, round, and reactive to light. Neck:      Musculoskeletal: Full passive range of motion without pain and normal range of motion. Thyroid: No thyromegaly. Vascular: No JVD. Trachea: Phonation normal.   Cardiovascular:      Rate and Rhythm: Normal rate and regular rhythm. Pulses: Normal pulses. Carotid pulses are 2+ on the right side and 2+ on the left side. Radial pulses are 2+ on the right side and 2+ on the left side. Heart sounds: Normal heart sounds. No murmur. Pulmonary:      Effort: Pulmonary effort is normal. No accessory muscle usage or respiratory distress. Breath sounds: Normal breath sounds. No wheezing or rales. Abdominal:      General: Bowel sounds are normal. There is no distension. Palpations: Abdomen is soft. Tenderness: There is no abdominal tenderness. There is no rebound. Musculoskeletal: Normal range of motion. General: No deformity. Lymphadenopathy:      Cervical: No cervical adenopathy. Skin:     General: Skin is warm. Capillary Refill: Capillary refill takes less than 2 seconds. Findings: No rash. Nails: There is no clubbing. Neurological:      Mental Status: He is alert and oriented to person, place, and time. Sensory: No sensory deficit.    Psychiatric:         Speech: Speech normal.         Behavior: Behavior normal.       BP (!) 158/86   Pulse 78   Resp 18   Wt 230 lb (104.3 kg)   SpO2 (!) 65%   BMI 34.97 kg/m²     Assessment:          1. Essential hypertension    - amLODIPine (NORVASC) 10 MG tablet; Take 1 tablet by mouth nightly  Dispense: 90 tablet; Refill: 1  - lisinopril-hydroCHLOROthiazide (PRINZIDE;ZESTORETIC) 20-12.5 MG per tablet; Take 2 tablets by mouth daily  Dispense: 180 tablet; Refill: 0    2. Primary osteoarthritis of both hips    - oxyCODONE-acetaminophen (PERCOCET) 5-325 MG per tablet; Take 1 tablet by mouth every 4 hours as needed for Pain for up to 7 days. Dispense: 21 tablet; Refill: 0            Diagnosis Orders   1. Essential hypertension  amLODIPine (NORVASC) 10 MG tablet    lisinopril-hydroCHLOROthiazide (PRINZIDE;ZESTORETIC) 20-12.5 MG per tablet   2. Primary osteoarthritis of both hips  oxyCODONE-acetaminophen (PERCOCET) 5-325 MG per tablet    DISCONTINUED: oxyCODONE-acetaminophen (PERCOCET) 5-325 MG per tablet           Plan:      Return in about 3 months (around 7/29/2021). No orders of the defined types were placed in this encounter. Orders Placed This Encounter   Medications    amLODIPine (NORVASC) 10 MG tablet     Sig: Take 1 tablet by mouth nightly     Dispense:  90 tablet     Refill:  1    lisinopril-hydroCHLOROthiazide (PRINZIDE;ZESTORETIC) 20-12.5 MG per tablet     Sig: Take 2 tablets by mouth daily     Dispense:  180 tablet     Refill:  0    DISCONTD: oxyCODONE-acetaminophen (PERCOCET) 5-325 MG per tablet     Sig: Take 1 tablet by mouth every 4 hours as needed for Pain for up to 7 days. Dispense:  21 tablet     Refill:  0     Reduce doses taken as pain becomes manageable    oxyCODONE-acetaminophen (PERCOCET) 5-325 MG per tablet     Sig: Take 1 tablet by mouth every 4 hours as needed for Pain for up to 7 days. Dispense:  21 tablet     Refill:  0     Reduce doses taken as pain becomes manageable         Patient given educational materials - see patient instructions. Discussed use, benefit, and side effects of prescribedmedications. All patient questions answered. Pt voiced understanding. Reviewed health maintenance. Instructed to continue current medications, diet and exercise. Patient agreed with treatment plan. Follow up as directed. I spent a total of 15 minutes face to face with this patient. Over 50% of that time was spent on counseling and care coordination. Please see assessment and plan for details. Electronically signed by Chris Bryant MD on 5/5/2021 at 4:33 PM      Please note that this chart was generated using voice recognition Dragon dictation software. Although every effort was made to ensure the accuracy of this automatedtranscription, some errors in transcription may have occurred.

## 2021-05-13 ENCOUNTER — OFFICE VISIT (OUTPATIENT)
Dept: PRIMARY CARE CLINIC | Age: 61
End: 2021-05-13
Payer: COMMERCIAL

## 2021-05-13 VITALS
HEART RATE: 88 BPM | RESPIRATION RATE: 18 BRPM | OXYGEN SATURATION: 84 % | WEIGHT: 223.4 LBS | DIASTOLIC BLOOD PRESSURE: 76 MMHG | SYSTOLIC BLOOD PRESSURE: 134 MMHG | BODY MASS INDEX: 33.97 KG/M2

## 2021-05-13 DIAGNOSIS — I10 ESSENTIAL HYPERTENSION: Primary | ICD-10-CM

## 2021-05-13 PROCEDURE — 3017F COLORECTAL CA SCREEN DOC REV: CPT | Performed by: INTERNAL MEDICINE

## 2021-05-13 PROCEDURE — G8427 DOCREV CUR MEDS BY ELIG CLIN: HCPCS | Performed by: INTERNAL MEDICINE

## 2021-05-13 PROCEDURE — G8417 CALC BMI ABV UP PARAM F/U: HCPCS | Performed by: INTERNAL MEDICINE

## 2021-05-13 PROCEDURE — 1036F TOBACCO NON-USER: CPT | Performed by: INTERNAL MEDICINE

## 2021-05-13 PROCEDURE — 99213 OFFICE O/P EST LOW 20 MIN: CPT | Performed by: INTERNAL MEDICINE

## 2021-05-13 ASSESSMENT — ENCOUNTER SYMPTOMS
NAUSEA: 0
BACK PAIN: 0
SHORTNESS OF BREATH: 0
WHEEZING: 0
COUGH: 0
SORE THROAT: 0
SINUS PRESSURE: 0
CONSTIPATION: 0
VOMITING: 0
ABDOMINAL PAIN: 0
ABDOMINAL DISTENTION: 0

## 2021-05-13 NOTE — PROGRESS NOTES
Chart Review for more information. Correspondence dated September 23, 2015 from Dr. Lindsey Gamez reviewed on 9/24/2015, see Media tab of Chart Review for more informatiion Correspondence dated October 4, 2016 from [facility]P from [facility] reviewed on 10/4/2016, see Media tab of Chart Review     Larynx cancer ([address] 2007    On Rad. Tx.   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 Lindsey Gamez reviewed on 9/13/16, see Media tab of Chart Review for more information.  Migraines     Mixed hyperlipidemia 11/5/2018    Neck pain 4/16/2012    Obstructive sleep apnea 3/15/2013    Clinical Polysomnography dated January 29, 2013 and February 3, 2013 from Dr. Tori Torres reviewed on 03/15/2013 see Media tab of Chart Review for more information. Correspondence dated 4/30/13 from Dr. Jesusita Morrell reviewed on 05/02/2013, see Media tab of Chart Review for more information.  On home oxygen therapy 2013    2L/NC nightly    Osteoarthritis     rt. hip pain    Prediabetes 9/6/2019    Rectal polyp 8/11/2017    Repeat colonoscopy due on or around August 11, 2022    Reflux laryngitis 5/22/2013    Correspondence dated May 21, 2013 from Dr. Lindsey Gamez reviewed on 05/22/2013, see Media tab of Chart Review for more information. Correspondence dated May 17, 2018 from Dr. Lindsey Gamez reviewed on 5/17/18, see Media tab of Chart Review for more information.  S/P colonoscopy with polypectomy 8/11/2017    Sleep apnea 2013    CPAP with Oxygen nightly    Vocal cord polyp 4/19/2013    Correspondence dated 4/15/13 from Dr. Lindsey Gamez reviewed on 04/19/2013 see Media tab of Chart Review for more information. Correspondence dated 4/23/13 from Dr. Lindsey Gamez reviewed on 04/27/2013 see Media tab of Chart Review for more information.      Wears eyeglasses       Past Surgical History:   Procedure Laterality Date    CATARACT REMOVAL Bilateral     MULTIPLE MORE ON LT    COLONOSCOPY  2011    5 Polyps removed    COLONOSCOPY  08/11/2017    with biopsy    HIP ARTHROPLASTY Right 14    LARYNGOSCOPY  10/17/2016    with biopsies    MICROLARYNGOSCOPY W BIOPSY      [place] COLSC FLX W/REMOVAL LESION BY HOT BX FORCEPS N/A 2017    COLONOSCOPY POLYPECTOMY HOT BIOPSY performed by Risa Arnold MD at [address] [place] [address]       Family History   Problem Relation Age of Onset    Stomach Cancer Mother     Lung Cancer Mother     Lung Cancer Brother     Esophageal Cancer Brother        Social History     Tobacco Use    Smoking status: Former Smoker     Packs/day: 2.00     Years: 45.00     Pack years: 90.00     Types: Cigarettes     Start date: 10/4/1970     Quit date: 2013     Years since quittin.9    Smokeless tobacco: Never Used    Tobacco comment: quit 3 yrs ago   Substance Use Topics    Alcohol use: No     Alcohol/week: 0.0 standard drinks      Current Outpatient Medications   Medication Sig Dispense Refill    furosemide (LASIX) 40 MG tablet Take 1 tablet by mouth daily 60 tablet 3    amLODIPine (NORVASC) 10 MG tablet Take 1 tablet by mouth nightly 90 tablet 1    lisinopril-hydroCHLOROthiazide (PRINZIDE;ZESTORETIC) 20-12.5 MG per tablet Take 2 tablets by mouth daily 180 tablet 0    Blood Pressure Monitoring (SPHYGMOMANOMETER) MISC Use it daily as needed 1 each 0    aspirin (ASPIRIN LOW DOSE) 81 MG EC tablet take 1 tablet by mouth once daily 90 tablet 0    atorvastatin (LIPITOR) 40 MG tablet Take 1 tablet by mouth daily 90 tablet 1    pantoprazole (PROTONIX) 40 MG tablet take 1 tablet by mouth daily 90 tablet 1    guaiFENesin (MUCINEX) 600 MG extended release tablet take 1 tablet by mouth twice a day if needed for congestion 60 tablet 0    Handicap Placard MISC by Does not apply route Expires 2025  Diagnosis: COPD 1 each 0     No current facility-administered medications for this visit.       No Known Allergies    Health Maintenance   Topic Date Due    Shingles Vaccine (1 of 2) Never done    DTaP/Tdap/Td vaccine (1 - Tdap) 09/04/2021 (Originally 2/25/1979)    Lipid screen  09/04/2021    Potassium monitoring  09/04/2021    Creatinine monitoring  09/04/2021    Low dose CT lung screening  09/10/2021    A1C test (Diabetic or Prediabetic)  12/11/2021    Colon cancer screen colonoscopy  08/11/2027    Flu vaccine  Completed    Pneumococcal 0-64 years Vaccine  Completed    COVID-19 Vaccine  Completed    Hepatitis C screen  Completed    HIV screen  Completed    Hepatitis A vaccine  Aged Out    Hepatitis B vaccine  Aged Out    Hib vaccine  Aged Out    Meningococcal (ACWY) vaccine  Aged Out       Subjective:      Review of Systems   Constitutional: Negative for appetite change, chills, fatigue and fever. HENT: Negative for congestion, sinus pressure, sneezing and sore throat. Eyes: Negative for visual disturbance. Respiratory: Negative for cough, shortness of breath and wheezing. Cardiovascular: Negative for chest pain and palpitations. Gastrointestinal: Negative for abdominal distention, abdominal pain, constipation, nausea and vomiting. Endocrine: Negative for cold intolerance, heat intolerance, polydipsia, polyphagia and polyuria. Genitourinary: Negative for decreased urine volume, difficulty urinating and urgency. Musculoskeletal: Negative for back pain and joint swelling. Skin: Negative for rash. Neurological: Negative for dizziness and headaches. Psychiatric/Behavioral: Negative for sleep disturbance. The patient is not nervous/anxious. All other systems reviewed and are negative. Objective:     Physical Exam  Vitals signs and nursing note reviewed. Constitutional:       General: He is not in acute distress. Appearance: Normal appearance. He is well-developed. He is not diaphoretic. HENT:      Head: Normocephalic and atraumatic. Right Ear: Hearing normal.      Left Ear: Hearing normal.      Mouth/Throat:      Pharynx: Uvula midline. Eyes:      General: No scleral icterus. Conjunctiva/sclera: Conjunctivae normal.      Pupils: Pupils are equal, round, and reactive to light. Neck:      Musculoskeletal: Full passive range of motion without pain and normal range of motion. Thyroid: No thyromegaly. Vascular: No JVD. Trachea: Phonation normal.   Cardiovascular:      Rate and Rhythm: Normal rate and regular rhythm. Pulses: Normal pulses. Carotid pulses are 2+ on the right side and 2+ on the left side. Radial pulses are 2+ on the right side and 2+ on the left side. Heart sounds: Normal heart sounds. No murmur. Pulmonary:      Effort: Pulmonary effort is normal. No accessory muscle usage or respiratory distress. Breath sounds: Normal breath sounds. No wheezing or rales. Abdominal:      General: Bowel sounds are normal. There is no distension. Palpations: Abdomen is soft. Tenderness: There is no abdominal tenderness. There is no rebound. Musculoskeletal: Normal range of motion. General: No deformity. Lymphadenopathy:      Cervical: No cervical adenopathy. Skin:     General: Skin is warm. Capillary Refill: Capillary refill takes less than 2 seconds. Findings: No rash. Nails: There is no clubbing. Neurological:      Mental Status: He is alert and oriented to person, place, and time. Sensory: No sensory deficit. Psychiatric:         Speech: Speech normal.         Behavior: Behavior normal.       /76   Pulse 88   Resp 18   Wt 223 lb 6.4 oz (101.3 kg)   SpO2 (!) 84%   BMI 33.97 kg/m²     Assessment:          1. Essential hypertension  Cont same regimen             Diagnosis Orders   1. Essential hypertension             Plan:      No follow-ups on file. No orders of the defined types were placed in this encounter. No orders of the defined types were placed in this encounter. Patient given educational materials - see patient instructions.   Discussed use, benefit, and side effects of prescribedmedications. All patient questions answered. Pt voiced understanding. Reviewed health maintenance. Instructed to continue current medications, diet and exercise. Patient agreed with treatment plan. Follow up as directed. I spent a total of 15 minutes face to face with this patient. Over 50% of that time was spent on counseling and care coordination. Please see assessment and plan for details. Electronically signed by Kem Blackwell MD on 5/13/2021 at 9:42 AM      Please note that this chart was generated using voice recognition Dragon dictation software. Although every effort was made to ensure the accuracy of this automatedtranscription, some errors in transcription may have occurred.

## 2021-06-18 DIAGNOSIS — I25.10 CORONARY ARTERY DISEASE INVOLVING NATIVE CORONARY ARTERY OF NATIVE HEART WITHOUT ANGINA PECTORIS: ICD-10-CM

## 2021-06-18 RX ORDER — ASPIRIN 81 MG/1
TABLET ORAL
Qty: 90 TABLET | Refills: 0 | Status: SHIPPED | OUTPATIENT
Start: 2021-06-18 | End: 2021-09-16

## 2021-06-25 ENCOUNTER — OFFICE VISIT (OUTPATIENT)
Dept: PRIMARY CARE CLINIC | Age: 61
End: 2021-06-25

## 2021-06-25 VITALS
RESPIRATION RATE: 16 BRPM | SYSTOLIC BLOOD PRESSURE: 100 MMHG | HEART RATE: 77 BPM | WEIGHT: 225.6 LBS | BODY MASS INDEX: 34.19 KG/M2 | OXYGEN SATURATION: 85 % | HEIGHT: 68 IN | DIASTOLIC BLOOD PRESSURE: 70 MMHG

## 2021-06-25 DIAGNOSIS — E11.69 TYPE 2 DIABETES MELLITUS WITH OTHER SPECIFIED COMPLICATION, UNSPECIFIED WHETHER LONG TERM INSULIN USE (HCC): ICD-10-CM

## 2021-06-25 DIAGNOSIS — Z59.6: ICD-10-CM

## 2021-06-25 DIAGNOSIS — M16.0 PRIMARY OSTEOARTHRITIS OF BOTH HIPS: ICD-10-CM

## 2021-06-25 DIAGNOSIS — I10 ESSENTIAL HYPERTENSION: ICD-10-CM

## 2021-06-25 DIAGNOSIS — M54.50 ACUTE RIGHT-SIDED LOW BACK PAIN WITHOUT SCIATICA: ICD-10-CM

## 2021-06-25 DIAGNOSIS — Z76.89 ENCOUNTER TO ESTABLISH CARE: Primary | ICD-10-CM

## 2021-06-25 DIAGNOSIS — C32.9 LARYNGEAL CANCER (HCC): ICD-10-CM

## 2021-06-25 RX ORDER — IBUPROFEN 800 MG/1
TABLET ORAL
COMMUNITY
Start: 2021-05-17 | End: 2022-01-07

## 2021-06-25 RX ORDER — OXYCODONE HYDROCHLORIDE AND ACETAMINOPHEN 5; 325 MG/1; MG/1
1 TABLET ORAL EVERY 4 HOURS PRN
Qty: 21 TABLET | Refills: 0 | Status: SHIPPED | OUTPATIENT
Start: 2021-06-25 | End: 2021-10-01 | Stop reason: SDUPTHER

## 2021-06-25 SDOH — ECONOMIC STABILITY: TRANSPORTATION INSECURITY
IN THE PAST 12 MONTHS, HAS THE LACK OF TRANSPORTATION KEPT YOU FROM MEDICAL APPOINTMENTS OR FROM GETTING MEDICATIONS?: NO

## 2021-06-25 SDOH — ECONOMIC STABILITY: FOOD INSECURITY: WITHIN THE PAST 12 MONTHS, YOU WORRIED THAT YOUR FOOD WOULD RUN OUT BEFORE YOU GOT MONEY TO BUY MORE.: SOMETIMES TRUE

## 2021-06-25 SDOH — ECONOMIC STABILITY: TRANSPORTATION INSECURITY
IN THE PAST 12 MONTHS, HAS LACK OF TRANSPORTATION KEPT YOU FROM MEETINGS, WORK, OR FROM GETTING THINGS NEEDED FOR DAILY LIVING?: NO

## 2021-06-25 SDOH — ECONOMIC STABILITY - INCOME SECURITY: LOW INCOME: Z59.6

## 2021-06-25 SDOH — ECONOMIC STABILITY: FOOD INSECURITY: WITHIN THE PAST 12 MONTHS, THE FOOD YOU BOUGHT JUST DIDN'T LAST AND YOU DIDN'T HAVE MONEY TO GET MORE.: SOMETIMES TRUE

## 2021-06-25 ASSESSMENT — ENCOUNTER SYMPTOMS
SHORTNESS OF BREATH: 0
EYE PAIN: 0
RHINORRHEA: 0
SINUS PAIN: 0
CONSTIPATION: 0
NAUSEA: 0
COUGH: 0
VOMITING: 0
ABDOMINAL PAIN: 0
DIARRHEA: 0

## 2021-06-25 ASSESSMENT — SOCIAL DETERMINANTS OF HEALTH (SDOH): HOW HARD IS IT FOR YOU TO PAY FOR THE VERY BASICS LIKE FOOD, HOUSING, MEDICAL CARE, AND HEATING?: SOMEWHAT HARD

## 2021-06-25 NOTE — PROGRESS NOTES
[address] PRIMARY CARE  [facility] [address] [facility]  [address]   Dept: [phone]  Dept Fax: [phone]    Jennifer Garcia is a 59 y.o. male who presents today for his medical conditions/complaints as noted below. Chief Complaint   Patient presents with   [facility] Establish Care     Follow up, previous Dr. Radha Guevara pt, back pain x 2 days has not used any medications       HPI:     Patient presents to the office to establish care. Prior patient of Dr. Radha Guevara. He has past medical history of hypertension, COPD, SHAMAR, laryngeal cancer, CAD, type 2 diabetes, osteoarthritis, obesity. Today, primary concern is back pain over the past 2 days. He feels he twisted it and muscles spasmed. Denies any radiating pain, numbness, tingling, change in bowel or bladder habits. He has a known history of back and hip issues related to arthritis. He has taken Percocet with relief. He is unable to take NSAIDs. Otherwise, patient has no new or acute concerns. He is tolerating medications well without any side effects. He follows with pulmonology for management of COPD. I reviewed with patient his allergies, medications, past medical history, surgical history, family history, and social history. BP stable. Weight stable. Hypertension  This is a chronic problem. The current episode started more than 1 year ago. The problem has been gradually improving since onset. The problem is controlled. Pertinent negatives include no anxiety, blurred vision, chest pain, headaches, palpitations, peripheral edema or shortness of breath. Risk factors for coronary artery disease include diabetes mellitus, dyslipidemia, obesity, male gender and family history. Past treatments include ACE inhibitors, diuretics and calcium channel blockers. The current treatment provides moderate improvement. Compliance problems include diet. Hypertensive end-organ damage includes CAD/MI. sleep apnea 3/15/2013    Clinical Polysomnography dated January 29, 2013 and February 3, 2013 from Dr. Adam Donis reviewed on 03/15/2013 see Media tab of Chart Review for more information. Correspondence dated 4/30/13 from Dr. Isaac Villeda reviewed on 05/02/2013, see Media tab of Chart Review for more information.  On home oxygen therapy 2013    2L/NC nightly    Osteoarthritis     rt. hip pain    Prediabetes 9/6/2019    Rectal polyp 8/11/2017    Repeat colonoscopy due on or around August 11, 2022    Reflux laryngitis 5/22/2013    Correspondence dated May 21, 2013 from Dr. Phil Chappell reviewed on 05/22/2013, see Media tab of Chart Review for more information. Correspondence dated May 17, 2018 from Dr. Phil Chappell reviewed on 5/17/18, see Media tab of Chart Review for more information.  S/P colonoscopy with polypectomy 8/11/2017    Sleep apnea 2013    CPAP with Oxygen nightly    Vocal cord polyp 4/19/2013    Correspondence dated 4/15/13 from Dr. Phil Chappell reviewed on 04/19/2013 see Media tab of Chart Review for more information. Correspondence dated 4/23/13 from Dr. Phil Chappell reviewed on 04/27/2013 see Media tab of Chart Review for more information.      Wears eyeglasses       Past Surgical History:   Procedure Laterality Date    CATARACT REMOVAL Bilateral     MULTIPLE MORE ON LT    COLONOSCOPY  2011    5 Polyps removed    COLONOSCOPY  08/11/2017    with biopsy    HIP ARTHROPLASTY Right 11/7/14    LARYNGOSCOPY  10/17/2016    with biopsies    MICROLARYNGOSCOPY W BIOPSY      CO COLSC FLX W/REMOVAL LESION BY HOT BX FORCEPS N/A 8/11/2017    COLONOSCOPY POLYPECTOMY HOT BIOPSY performed by Darrell Larios MD at [address] [place] [address]       Family History   Problem Relation Age of Onset    Stomach Cancer Mother     Lung Cancer Mother     Lung Cancer Brother     Esophageal Cancer Brother        Social History     Tobacco Use    Smoking status: Former Smoker     Packs/day: 2.00     Years: 45.00     Pack years: 90.00     Types: Cigarettes Pneumococcal 0-64 years Vaccine (2 of 2 - PPSV23) 02/25/2023    Colon cancer screen colonoscopy  08/11/2027    Flu vaccine  Completed    COVID-19 Vaccine  Completed    Hepatitis C screen  Completed    HIV screen  Completed    Hepatitis A vaccine  Aged Out    Hib vaccine  Aged Out    Meningococcal (ACWY) vaccine  Aged Out       Subjective:      Review of Systems   Constitutional: Negative for chills, fatigue and fever. HENT: Negative for congestion, rhinorrhea and sinus pain. Eyes: Negative for blurred vision and pain. Respiratory: Negative for cough and shortness of breath. Cardiovascular: Negative for chest pain, palpitations and leg swelling. Gastrointestinal: Negative for abdominal pain, constipation, diarrhea, nausea and vomiting. Genitourinary: Negative for difficulty urinating, enuresis and testicular pain. Musculoskeletal: Positive for back pain. Negative for arthralgias and myalgias. Skin: Negative for rash. Neurological: Negative for dizziness and headaches. Psychiatric/Behavioral: Negative for confusion and sleep disturbance. The patient is not nervous/anxious. All other systems reviewed and are negative. Objective:     Physical Exam  Vitals and nursing note reviewed. Constitutional:       General: He is not in acute distress. Appearance: Normal appearance. HENT:      Head: Normocephalic. Mouth/Throat:      Mouth: Mucous membranes are moist.   Eyes:      Extraocular Movements: Extraocular movements intact. Conjunctiva/sclera: Conjunctivae normal.      Pupils: Pupils are equal, round, and reactive to light. Cardiovascular:      Rate and Rhythm: Normal rate and regular rhythm. Pulses: Normal pulses. Heart sounds: Normal heart sounds. Pulmonary:      Effort: Pulmonary effort is normal.      Breath sounds: Normal breath sounds. Abdominal:      General: Abdomen is flat. Bowel sounds are normal.      Palpations: Abdomen is soft. Tenderness: There is no abdominal tenderness. Musculoskeletal:      Cervical back: Normal range of motion. Lumbar back: Tenderness (R lumbar paraspinal) present. No swelling. Decreased range of motion. Negative right straight leg raise test and negative left straight leg raise test.      Right lower leg: No edema. Left lower leg: No edema. Lymphadenopathy:      Cervical: No cervical adenopathy. Skin:     General: Skin is warm. Capillary Refill: Capillary refill takes less than 2 seconds. Neurological:      General: No focal deficit present. Mental Status: He is alert and oriented to person, place, and time. Psychiatric:         Mood and Affect: Mood normal.         Behavior: Behavior normal.       /70 (Site: Left Upper Arm, Position: Sitting, Cuff Size: Medium Adult)   Pulse 77   Resp 16   Ht 5' 8\" (1.727 m)   Wt 225 lb 9.6 oz (102.3 kg)   SpO2 (!) 85%   BMI 34.30 kg/m²     Assessment:       ICD-10-CM    1. Encounter to establish care  Z76.89    2. Primary osteoarthritis of both hips  M16.0 oxyCODONE-acetaminophen (PERCOCET) 5-325 MG per tablet   3. Acute right-sided low back pain without sciatica  M54.5 oxyCODONE-acetaminophen (PERCOCET) 5-325 MG per tablet   4. Type 2 diabetes mellitus with other specified complication, unspecified whether long term insulin use (HCC)  E11.69    5. Laryngeal cancer ([facility] [address]  C32.9    6. Economic deprivation  Z59.6 [facility] Referral for Social Determinants of Health   7. Essential hypertension  I10             Plan:      1. Initial visit to establish care.  2, 3. Patient with acute right-sided low back pain. Rx Percocet to take as needed otherwise take Tylenol as needed. I recommended gentle stretching, heat. If pain persist will evaluate with imaging. 4.  Type 2 diabetes is stable. Continue current medications. 5.  Patient advised to follow with ENT as needed for past history of laryngeal cancer.   6.  Patient given referral to [facility] for further evaluation of financial and food resource strain. 7.  Patient to continue current antihypertensive regimen. Advised to monitor BP at home for low and high numbers. Return in about 3 months (around 9/25/2021) for blood pressure, medication recheck. Orders Placed This Encounter   Procedures   [facility] Referral for Social Determinants of Health     Referral Priority:   Routine     Referral Type: Other     Referral Reason:   Specialty Services Required     Requested Specialty:   Licensed Clinical      Number of Visits Requested:   1         Patient given educational materials - see patient instructions. Discussed use, benefit, and side effects of prescribedmedications. All patient questions answered. Pt voiced understanding. Reviewed health maintenance. Instructed to continue current medications, diet and exercise. Patient agreed with treatment plan. Follow up as directed.         Electronically signed by Emi Alonzo PA-C on 6/28/2021 at 8:42 AM

## 2021-06-28 ASSESSMENT — ENCOUNTER SYMPTOMS
BACK PAIN: 1
BLURRED VISION: 0

## 2021-06-29 ENCOUNTER — TELEPHONE (OUTPATIENT)
Dept: FAMILY MEDICINE CLINIC | Age: 61
End: 2021-06-29

## 2021-07-09 ENCOUNTER — TELEPHONE (OUTPATIENT)
Dept: FAMILY MEDICINE CLINIC | Age: 61
End: 2021-07-09

## 2021-07-09 NOTE — TELEPHONE ENCOUNTER
Called patient and left Vm asking for a call back/ This is the second attempt to contact. Line goes directly to VM, I do not believe they check the box regularly.

## 2021-07-13 NOTE — TELEPHONE ENCOUNTER
Gabriella Gasca was contacted  by writer to discuss referral for SDOH related needs. Writer spoke with: Patient and explained the services and assistance that can be provided through the patient navigation program.     Patient not agreeable to receiving resources and support from [address] [place] [address]. Discussed the following with the patient:      Plan of Care: Providing future assistance if needed. Patient was referred to: N/A      Follow up with patient necessary: No    Follow up with resource organization necessary: No    Patient was provided with writer's contact information should they require any additional assistance.        Jimbo Kamara

## 2021-07-21 DIAGNOSIS — I10 ESSENTIAL HYPERTENSION: ICD-10-CM

## 2021-07-21 RX ORDER — LISINOPRIL AND HYDROCHLOROTHIAZIDE 20; 12.5 MG/1; MG/1
2 TABLET ORAL DAILY
Qty: 180 TABLET | Refills: 0 | Status: SHIPPED | OUTPATIENT
Start: 2021-07-21 | End: 2021-10-13

## 2021-07-23 ENCOUNTER — OFFICE VISIT (OUTPATIENT)
Dept: PULMONOLOGY | Age: 61
End: 2021-07-23
Payer: COMMERCIAL

## 2021-07-23 VITALS
OXYGEN SATURATION: 86 % | SYSTOLIC BLOOD PRESSURE: 129 MMHG | WEIGHT: 225 LBS | RESPIRATION RATE: 15 BRPM | HEIGHT: 68 IN | BODY MASS INDEX: 34.1 KG/M2 | TEMPERATURE: 98 F | DIASTOLIC BLOOD PRESSURE: 80 MMHG | HEART RATE: 93 BPM

## 2021-07-23 DIAGNOSIS — G47.33 OSA ON CPAP: ICD-10-CM

## 2021-07-23 DIAGNOSIS — Z87.891 STOPPED SMOKING WITH GREATER THAN 40 PACK YEAR HISTORY: ICD-10-CM

## 2021-07-23 DIAGNOSIS — Z87.891 PERSONAL HISTORY OF TOBACCO USE: ICD-10-CM

## 2021-07-23 DIAGNOSIS — C32.9 LARYNGEAL CANCER (HCC): ICD-10-CM

## 2021-07-23 DIAGNOSIS — J44.9 STAGE 4 VERY SEVERE COPD BY GOLD CLASSIFICATION (HCC): Primary | ICD-10-CM

## 2021-07-23 DIAGNOSIS — Z99.89 OSA ON CPAP: ICD-10-CM

## 2021-07-23 DIAGNOSIS — J96.11 CHRONIC HYPOXEMIC RESPIRATORY FAILURE (HCC): ICD-10-CM

## 2021-07-23 PROCEDURE — G8417 CALC BMI ABV UP PARAM F/U: HCPCS | Performed by: INTERNAL MEDICINE

## 2021-07-23 PROCEDURE — 3023F SPIROM DOC REV: CPT | Performed by: INTERNAL MEDICINE

## 2021-07-23 PROCEDURE — 1036F TOBACCO NON-USER: CPT | Performed by: INTERNAL MEDICINE

## 2021-07-23 PROCEDURE — G8926 SPIRO NO PERF OR DOC: HCPCS | Performed by: INTERNAL MEDICINE

## 2021-07-23 PROCEDURE — 3017F COLORECTAL CA SCREEN DOC REV: CPT | Performed by: INTERNAL MEDICINE

## 2021-07-23 PROCEDURE — G8427 DOCREV CUR MEDS BY ELIG CLIN: HCPCS | Performed by: INTERNAL MEDICINE

## 2021-07-23 PROCEDURE — 99213 OFFICE O/P EST LOW 20 MIN: CPT | Performed by: INTERNAL MEDICINE

## 2021-07-23 ASSESSMENT — ENCOUNTER SYMPTOMS
SHORTNESS OF BREATH: 1
BACK PAIN: 1
WHEEZING: 1
CHEST TIGHTNESS: 1
EYES NEGATIVE: 1

## 2021-07-23 ASSESSMENT — SLEEP AND FATIGUE QUESTIONNAIRES
HOW LIKELY ARE YOU TO NOD OFF OR FALL ASLEEP WHILE SITTING AND TALKING TO SOMEONE: 0
ESS TOTAL SCORE: 3
HOW LIKELY ARE YOU TO NOD OFF OR FALL ASLEEP WHILE SITTING AND READING: 0
HOW LIKELY ARE YOU TO NOD OFF OR FALL ASLEEP WHILE WATCHING TV: 0
HOW LIKELY ARE YOU TO NOD OFF OR FALL ASLEEP IN A CAR, WHILE STOPPED FOR A FEW MINUTES IN TRAFFIC: 0
HOW LIKELY ARE YOU TO NOD OFF OR FALL ASLEEP WHEN YOU ARE A PASSENGER IN A CAR FOR AN HOUR WITHOUT A BREAK: 0
HOW LIKELY ARE YOU TO NOD OFF OR FALL ASLEEP WHILE SITTING QUIETLY AFTER LUNCH WITHOUT ALCOHOL: 0
HOW LIKELY ARE YOU TO NOD OFF OR FALL ASLEEP WHILE SITTING INACTIVE IN A PUBLIC PLACE: 0
HOW LIKELY ARE YOU TO NOD OFF OR FALL ASLEEP WHILE LYING DOWN TO REST IN THE AFTERNOON WHEN CIRCUMSTANCES PERMIT: 3

## 2021-07-23 NOTE — PROGRESS NOTES
Subjective:      Patient ID: Lyssa Witt is a 59 y.o. male being seen in my clinic for   Chief Complaint   Patient presents with    COPD     follow up       HPI  Follow-up visit for stage IV COPD complicated by chronic hypoxemic respiratory failure. 6-minute walk test reviewed. The patient worked walked for approximately 1450 feet with a predicted distance of 1637. Prompt oxygen desaturations down to 84% were noted. He was titrated as high as 5 L but only to 87%. Of note, he asked exhibited no dyspnea or other symptoms. Patient claims that his exercise capacity is limited more by his hip arthritis than his breathing. He uses oxygen 2 L a minute at night and as needed during the day. Currently today his oxygen saturation is 86% on room air although this was after he walked into the exam room. He claims that at home at rest he is 92%. Asking about [place] valve. I reviewed his chest CT however and he has no significant bullae. Compliance download from CPAP shows 100% compliance for an average of 10 hours and 15 minutes per night. He is on auto titrating CPAP 10/20 and his median pressure is 10.9. Residual AHI is 1.8. Reports refreshing and restorative sleep. Believes he is benefiting greatly. Review of Systems   Constitutional: Negative. HENT: Negative. Eyes: Negative. Respiratory: Positive for chest tightness, shortness of breath and wheezing. Cardiovascular: Negative. Musculoskeletal: Positive for arthralgias, back pain and gait problem. All other systems reviewed and are negative.       Objective:     Vitals:    07/23/21 1341   BP: 129/80   Site: Left Upper Arm   Position: Sitting   Pulse: 93   Resp: 15   Temp: 98 °F (36.7 °C)   TempSrc: Temporal   SpO2: (!) 86%   Weight: 225 lb (102.1 kg)   Height: 5' 8\" (1.727 m)     Current Outpatient Medications   Medication Sig Dispense Refill    lisinopril-hydroCHLOROthiazide (PRINZIDE;ZESTORETIC) 20-12.5 MG per tablet Take 2 tablets by mouth daily 180 tablet 0    ibuprofen (ADVIL;MOTRIN) 800 MG tablet take 1 tablet by mouth every 8 hours if needed for pain      aspirin (ASPIRIN LOW DOSE) 81 MG EC tablet take 1 tablet by mouth once daily 90 tablet 0    furosemide (LASIX) 40 MG tablet Take 1 tablet by mouth daily 60 tablet 3    amLODIPine (NORVASC) 10 MG tablet Take 1 tablet by mouth nightly 90 tablet 1    Blood Pressure Monitoring (SPHYGMOMANOMETER) MISC Use it daily as needed 1 each 0    atorvastatin (LIPITOR) 40 MG tablet Take 1 tablet by mouth daily 90 tablet 1    pantoprazole (PROTONIX) 40 MG tablet take 1 tablet by mouth daily 90 tablet 1    guaiFENesin (MUCINEX) 600 MG extended release tablet take 1 tablet by mouth twice a day if needed for congestion 60 tablet 0    Handicap Placard MISC by Does not apply route Expires 2/13/2025  Diagnosis: COPD 1 each 0     No current facility-administered medications for this visit. Physical Exam  Vitals and nursing note reviewed. Constitutional:       Appearance: He is well-developed. He is obese. HENT:      Nose: Nose normal. No congestion. Mouth/Throat:      Mouth: Mucous membranes are moist.      Pharynx: Oropharynx is clear. No oropharyngeal exudate. Comments: Large tongue , low hanging soft palate and large uvula. Overall pharyngeal orifice moderately decreased    Eyes:      General: No scleral icterus. Conjunctiva/sclera: Conjunctivae normal.   Neck:      Thyroid: No thyromegaly. Vascular: No JVD. Trachea: No tracheal deviation. Cardiovascular:      Rate and Rhythm: Normal rate and regular rhythm. Heart sounds: Normal heart sounds. No murmur heard. No gallop. Comments: P2 not increased. Pulmonary:      Effort: Pulmonary effort is normal. No respiratory distress. Breath sounds: No wheezing or rales. Comments: AP diameter of chest increased. Thoracic expansion and diaphragmatic excursion diminished.  BS diminished and expiratory phase prolonged. No dullness to percussion or tenderness to palpation. Chest:      Chest wall: No tenderness. Abdominal:      Palpations: Abdomen is soft. Tenderness: There is no abdominal tenderness. Musculoskeletal:      Cervical back: Neck supple. Right lower leg: No edema. Left lower leg: No edema. Lymphadenopathy:      Cervical: No cervical adenopathy. Skin:     General: Skin is warm and dry. Neurological:      Mental Status: He is alert and oriented to person, place, and time. Wt Readings from Last 3 Encounters:   07/23/21 225 lb (102.1 kg)   06/25/21 225 lb 9.6 oz (102.3 kg)   05/13/21 223 lb 6.4 oz (101.3 kg)     Results for orders placed or performed in visit on 12/11/20   POCT glycosylated hemoglobin (Hb A1C)   Result Value Ref Range    Hemoglobin A1C 5.9 %       :      1. Stage 4 very severe COPD by GOLD classification ([facility] [address]    2. SHAMAR on CPAP    3. Chronic hypoxemic respiratory failure (HCC)    4. Personal history of tobacco use    5. Stopped smoking with greater than 40 pack year history    6. Laryngeal cancer [facility])      Patient Active Problem List   Diagnosis    Laryngeal cancer ([facility] [address]    Osteoarthritis    Heart burn    Colon polyp    COPD (chronic obstructive pulmonary disease) ([facility] [address]    Headache    Neck pain    HTN (hypertension)    Atypical chest pain    Obstructive sleep apnea    Vocal cord polyp    Reflux laryngitis    Dysphagia    Coronary artery disease    High cholesterol    Chronic neck pain    Chronic headache    Hip arthritis    Chronic intermittent hypoxia with obstructive sleep apnea    Impaired fasting glucose    Leukoplakia of vocal cords    Colitis    Hepatic steatosis    S/P colonoscopy with polypectomy    Rectal polyp    Adenomatous polyp    Mixed hyperlipidemia    Prediabetes    Type 2 diabetes mellitus with other specified complication, unspecified whether long term insulin use ([facility] [address]         Plan:      1.  Continuous oxygen therapy 2 L at rest and 4-5 L with exercise. On the other hand, survival benefit was not showing when using oxygen for exercise-induced hypoxemia. 2. Weight loss. 3. Encouraged regular exercise. 4. Continue CPAP. 5. Flu shot in fall. 6. Continue bronchodilators. 7. Return in 6 months. No orders of the defined types were placed in this encounter. No orders of the defined types were placed in this encounter. Return in about 6 months (around 1/23/2022).        Electronically signed by Silvia Davis DO on 7/23/2021at 2:12 PM

## 2021-08-25 ENCOUNTER — TELEPHONE (OUTPATIENT)
Dept: ONCOLOGY | Age: 61
End: 2021-08-25

## 2021-08-25 DIAGNOSIS — Z87.891 PERSONAL HISTORY OF NICOTINE DEPENDENCE: Primary | ICD-10-CM

## 2021-08-25 NOTE — TELEPHONE ENCOUNTER
Our records indicate that your patient is due for their annual lung cancer screening follow up testing. For your convenience, we have pended the order for the scan for you. If you do not agree with the need for the test, please cancel the order and let us know. Sincerely,    [address] [place] [address] Screening Program    Auto printed reminder letter sent to patient.

## 2021-09-16 ENCOUNTER — HOSPITAL ENCOUNTER (OUTPATIENT)
Dept: CT IMAGING | Age: 61
Discharge: HOME | End: 2021-09-18
Payer: COMMERCIAL

## 2021-09-16 DIAGNOSIS — Z87.891 PERSONAL HISTORY OF NICOTINE DEPENDENCE: ICD-10-CM

## 2021-09-16 DIAGNOSIS — I25.10 CORONARY ARTERY DISEASE INVOLVING NATIVE CORONARY ARTERY OF NATIVE HEART WITHOUT ANGINA PECTORIS: ICD-10-CM

## 2021-09-16 PROCEDURE — 71271 CT THORAX LUNG CANCER SCR C-: CPT

## 2021-09-16 RX ORDER — ASPIRIN 81 MG/1
TABLET ORAL
Qty: 90 TABLET | Refills: 0 | Status: SHIPPED | OUTPATIENT
Start: 2021-09-16 | End: 2021-12-09

## 2021-09-30 ENCOUNTER — HOSPITAL ENCOUNTER (OUTPATIENT)
Age: 61
Setting detail: SPECIMEN
Discharge: HOME | End: 2021-09-30
Payer: COMMERCIAL

## 2021-09-30 ENCOUNTER — OFFICE VISIT (OUTPATIENT)
Dept: PRIMARY CARE CLINIC | Age: 61
End: 2021-09-30
Payer: COMMERCIAL

## 2021-09-30 VITALS
HEART RATE: 100 BPM | RESPIRATION RATE: 16 BRPM | HEIGHT: 68 IN | BODY MASS INDEX: 33.01 KG/M2 | OXYGEN SATURATION: 88 % | SYSTOLIC BLOOD PRESSURE: 124 MMHG | WEIGHT: 217.8 LBS | DIASTOLIC BLOOD PRESSURE: 72 MMHG

## 2021-09-30 DIAGNOSIS — E78.00 HIGH CHOLESTEROL: ICD-10-CM

## 2021-09-30 DIAGNOSIS — M54.50 ACUTE RIGHT-SIDED LOW BACK PAIN WITHOUT SCIATICA: ICD-10-CM

## 2021-09-30 DIAGNOSIS — M16.0 PRIMARY OSTEOARTHRITIS OF BOTH HIPS: Primary | ICD-10-CM

## 2021-09-30 DIAGNOSIS — Z12.5 SCREENING PSA (PROSTATE SPECIFIC ANTIGEN): ICD-10-CM

## 2021-09-30 DIAGNOSIS — E11.69 TYPE 2 DIABETES MELLITUS WITH OTHER SPECIFIED COMPLICATION, UNSPECIFIED WHETHER LONG TERM INSULIN USE (HCC): ICD-10-CM

## 2021-09-30 DIAGNOSIS — Z23 NEED FOR INFLUENZA VACCINATION: ICD-10-CM

## 2021-09-30 LAB
ABSOLUTE EOS #: 0.31 K/UL (ref 0–0.44)
ABSOLUTE IMMATURE GRANULOCYTE: 0.04 K/UL (ref 0–0.3)
ABSOLUTE LYMPH #: 1.29 K/UL (ref 1.1–3.7)
ABSOLUTE MONO #: 0.86 K/UL (ref 0.1–1.2)
ALBUMIN SERPL-MCNC: 4.6 G/DL (ref 3.5–5.2)
ALBUMIN/GLOBULIN RATIO: 1.3 (ref 1–2.5)
ALP BLD-CCNC: 88 U/L (ref 40–129)
ALT SERPL-CCNC: 19 U/L (ref 5–41)
ANION GAP SERPL CALCULATED.3IONS-SCNC: 16 MMOL/L (ref 9–17)
AST SERPL-CCNC: 15 U/L
BASOPHILS # BLD: 1 % (ref 0–2)
BASOPHILS ABSOLUTE: 0.06 K/UL (ref 0–0.2)
BILIRUB SERPL-MCNC: 0.51 MG/DL (ref 0.3–1.2)
BUN BLDV-MCNC: 69 MG/DL (ref 8–23)
BUN/CREAT BLD: ABNORMAL (ref 9–20)
CALCIUM SERPL-MCNC: 9.8 MG/DL (ref 8.6–10.4)
CHLORIDE BLD-SCNC: 101 MMOL/L (ref 98–107)
CHOLESTEROL/HDL RATIO: 6.2
CHOLESTEROL: 217 MG/DL
CO2: 24 MMOL/L (ref 20–31)
CREAT SERPL-MCNC: 2.61 MG/DL (ref 0.7–1.2)
DIFFERENTIAL TYPE: ABNORMAL
EOSINOPHILS RELATIVE PERCENT: 6 % (ref 1–4)
ESTIMATED AVERAGE GLUCOSE: 157 MG/DL
GFR AFRICAN AMERICAN: 30 ML/MIN
GFR NON-AFRICAN AMERICAN: 25 ML/MIN
GFR SERPL CREATININE-BSD FRML MDRD: ABNORMAL ML/MIN/{1.73_M2}
GFR SERPL CREATININE-BSD FRML MDRD: ABNORMAL ML/MIN/{1.73_M2}
GLUCOSE BLD-MCNC: 89 MG/DL (ref 70–99)
HBA1C MFR BLD: 7.1 % (ref 4–6)
HCT VFR BLD CALC: 44.4 % (ref 40.7–50.3)
HDLC SERPL-MCNC: 35 MG/DL
HEMOGLOBIN: 13.4 G/DL (ref 13–17)
IMMATURE GRANULOCYTES: 1 %
LDL CHOLESTEROL: 138 MG/DL (ref 0–130)
LYMPHOCYTES # BLD: 23 % (ref 24–43)
MCH RBC QN AUTO: 25 PG (ref 25.2–33.5)
MCHC RBC AUTO-ENTMCNC: 30.2 G/DL (ref 28.4–34.8)
MCV RBC AUTO: 83 FL (ref 82.6–102.9)
MONOCYTES # BLD: 15 % (ref 3–12)
NRBC AUTOMATED: 0 PER 100 WBC
PDW BLD-RTO: 16.1 % (ref 11.8–14.4)
PLATELET # BLD: 185 K/UL (ref 138–453)
PLATELET ESTIMATE: ABNORMAL
PMV BLD AUTO: 12.1 FL (ref 8.1–13.5)
POTASSIUM SERPL-SCNC: 5.6 MMOL/L (ref 3.7–5.3)
PROSTATE SPECIFIC ANTIGEN: 4.54 UG/L
RBC # BLD: 5.35 M/UL (ref 4.21–5.77)
RBC # BLD: ABNORMAL 10*6/UL
SEG NEUTROPHILS: 54 % (ref 36–65)
SEGMENTED NEUTROPHILS ABSOLUTE COUNT: 3.12 K/UL (ref 1.5–8.1)
SODIUM BLD-SCNC: 141 MMOL/L (ref 135–144)
TOTAL PROTEIN: 8.2 G/DL (ref 6.4–8.3)
TRIGL SERPL-MCNC: 218 MG/DL
VLDLC SERPL CALC-MCNC: ABNORMAL MG/DL (ref 1–30)
WBC # BLD: 5.7 K/UL (ref 3.5–11.3)
WBC # BLD: ABNORMAL 10*3/UL

## 2021-09-30 PROCEDURE — G8417 CALC BMI ABV UP PARAM F/U: HCPCS | Performed by: PHYSICIAN ASSISTANT

## 2021-09-30 PROCEDURE — 90471 IMMUNIZATION ADMIN: CPT | Performed by: PHYSICIAN ASSISTANT

## 2021-09-30 PROCEDURE — 3051F HG A1C>EQUAL 7.0%<8.0%: CPT | Performed by: PHYSICIAN ASSISTANT

## 2021-09-30 PROCEDURE — 99214 OFFICE O/P EST MOD 30 MIN: CPT | Performed by: PHYSICIAN ASSISTANT

## 2021-09-30 PROCEDURE — G8427 DOCREV CUR MEDS BY ELIG CLIN: HCPCS | Performed by: PHYSICIAN ASSISTANT

## 2021-09-30 PROCEDURE — 90674 CCIIV4 VAC NO PRSV 0.5 ML IM: CPT | Performed by: PHYSICIAN ASSISTANT

## 2021-09-30 PROCEDURE — 2022F DILAT RTA XM EVC RTNOPTHY: CPT | Performed by: PHYSICIAN ASSISTANT

## 2021-09-30 PROCEDURE — 3017F COLORECTAL CA SCREEN DOC REV: CPT | Performed by: PHYSICIAN ASSISTANT

## 2021-09-30 PROCEDURE — 1036F TOBACCO NON-USER: CPT | Performed by: PHYSICIAN ASSISTANT

## 2021-09-30 NOTE — PROGRESS NOTES
[address] PRIMARY CARE  [facility] [address] [facility] [address]  [address] [address]  Dept: [phone]  Dept Fax: [phone]    Pawel Mcintosh is a 59 y.o. male who presents today for his medical conditions/complaints as noted below. Chief Complaint   Patient presents with    Hypertension     stopped taking Amlodipine due to incrased drowsiness, taking Lasix every other day    Other     pain in lower abdomen x 1 week       HPI:     Patient presents the office for routine follow-up. Patient has past medical history including hypertension, CAD, laryngeal cancer, diabetes, osteoarthritis, SHAMAR, hyperlipidemia. Patient has concerns for new onset gradual left lower abdominal pain. He reports this is an annoyance. Denies sharp unrelenting pain. Denies changes in bowel patterns. Denies change in urinary habits. No nausea or vomiting. Has not taken any over-the-counter medications. I discussed possible diverticula flare. I recommended staying well-hydrated, increasing fiber. I advised to monitor symptoms and call with any changes. Patient does admit to stopping amlodipine due to side effects. He is also stopped his statin therapy due to side effects. He feels these medications are ineffective and causing drowsiness and generalized feelings of aches. BP stable. Weight decreased 8 pounds since last visit.         Hemoglobin A1C (%)   Date Value   2021 7.1 (H)   2020 5.9   2020 6.8 (H)             ( goal A1C is < 7)   No results found for: LABMICR  LDL Cholesterol (mg/dL)   Date Value   2021 138 (H)   2020 127   2018 126       (goal LDL is <100)   AST (U/L)   Date Value   2021 15     ALT (U/L)   Date Value   2021 19     BUN (mg/dL)   Date Value   2021 69 (H)     BP Readings from Last 3 Encounters:   21 124/72   21 129/80   21 100/70          (goal 120/80)    Past Medical History:   Diagnosis Date    Adenomatous polyp 8/15/2017    Repeat colonoscopy recommended to patient in 2020.  Chronic headache 11/6/2014    Chronic intermittent hypoxia with obstructive sleep apnea     Chronic neck pain 11/6/2014    Colitis 5/8/2017    CT scan dated May 6, 2017 reviewed.  Colon polyp     COPD (chronic obstructive pulmonary disease) ([facility] [address]     Full dentures     Upper & Lower    Headache 12/7/2011    See's neurology.  Heart burn     GERD    Hepatic steatosis 6/2/2017    High cholesterol 6/25/2014    Hip arthritis 11/7/2014    Hyperlipidemia     Hypertension     On Meds    Impaired fasting glucose 12/3/2015    Monitor     Laryngeal cancer [facility])     Consultant: Dr. Alissa Mayorga dated June 11, 2015 from Dr. Jameel Guillermo reviewed on 07/24/2015, see Media tab of Chart Review for more information. Correspondence dated September 23, 2015 from Dr. Agustin Rebolledo reviewed on 9/24/2015, see Media tab of Chart Review for more informatiion Correspondence dated October 4, 2016 from H&P from [facility] reviewed on 10/4/2016, see Media tab of Chart Review     Larynx cancer ([facility] [address] 2007    On Rad. Tx.   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 Agustin Rebolledo reviewed on 9/13/16, see Media tab of Chart Review for more information.  Migraines     Mixed hyperlipidemia 11/5/2018    Neck pain 4/16/2012    Obstructive sleep apnea 3/15/2013    Clinical Polysomnography dated January 29, 2013 and February 3, 2013 from Dr. Destiny Martinez reviewed on 03/15/2013 see Media tab of Chart Review for more information. Correspondence dated 4/30/13 from Dr. Fabiana Rueda reviewed on 05/02/2013, see Media tab of Chart Review for more information.      On home oxygen therapy 2013    2L/NC nightly    Osteoarthritis     rt. hip pain    Prediabetes 9/6/2019    Rectal polyp 8/11/2017    Repeat colonoscopy due on or around August 11, 2022    Reflux laryngitis 5/22/2013    Correspondence dated May 21, 2013 from  Patricia Patrick reviewed on 2013, see Media tab of Chart Review for more information. Correspondence dated May 17, 2018 from Dr. Patricia Patrick reviewed on 18, see Media tab of Chart Review for more information.  S/P colonoscopy with polypectomy 2017    Sleep apnea     CPAP with Oxygen nightly    Vocal cord polyp 2013    Correspondence dated 4/15/13 from Dr. Patricia Patrick reviewed on 2013 see Media tab of Chart Review for more information. Correspondence dated 13 from Dr. Patricia Patrick reviewed on 2013 see Media tab of Chart Review for more information.  Wears eyeglasses       Past Surgical History:   Procedure Laterality Date    CATARACT REMOVAL Bilateral     MULTIPLE MORE ON LT    COLONOSCOPY      5 Polyps removed    COLONOSCOPY  2017    with biopsy    HIP ARTHROPLASTY Right 14    LARYNGOSCOPY  10/17/2016    with biopsies    MICROLARYNGOSCOPY W BIOPSY      [place] COLSC FLX W/REMOVAL LESION BY HOT BX FORCEPS N/A 2017    COLONOSCOPY POLYPECTOMY HOT BIOPSY performed by Brianne Mcarthur MD at [address] [place] [address] History   Problem Relation Age of Onset    Stomach Cancer Mother     Lung Cancer Mother     Lung Cancer Brother     Esophageal Cancer Brother        Social History     Tobacco Use    Smoking status: Former Smoker     Packs/day: 2.00     Years: 45.00     Pack years: 90.00     Types: Cigarettes     Start date: 10/4/1970     Quit date: 2013     Years since quittin.3    Smokeless tobacco: Never Used    Tobacco comment: quit 3 yrs ago   Substance Use Topics    Alcohol use: No     Alcohol/week: 0.0 standard drinks      Current Outpatient Medications   Medication Sig Dispense Refill    oxyCODONE-acetaminophen (PERCOCET) 5-325 MG per tablet Take 1 tablet by mouth every 4 hours as needed for Pain for up to 7 days.  21 tablet 0    aspirin (RA ASPIRIN EC) 81 MG EC tablet take 1 tablet by mouth once daily 90 tablet 0    lisinopril-hydroCHLOROthiazide (PRINZIDE;ZESTORETIC) 20-12.5 MG per tablet Take 2 tablets by mouth daily 180 tablet 0    ibuprofen (ADVIL;MOTRIN) 800 MG tablet take 1 tablet by mouth every 8 hours if needed for pain      furosemide (LASIX) 40 MG tablet Take 1 tablet by mouth daily 60 tablet 3    Blood Pressure Monitoring (SPHYGMOMANOMETER) MISC Use it daily as needed 1 each 0    pantoprazole (PROTONIX) 40 MG tablet take 1 tablet by mouth daily 90 tablet 1    guaiFENesin (MUCINEX) 600 MG extended release tablet take 1 tablet by mouth twice a day if needed for congestion 60 tablet 0    Handicap Placard MISC by Does not apply route Expires 2/13/2025  Diagnosis: COPD 1 each 0     No current facility-administered medications for this visit. No Known Allergies    Health Maintenance   Topic Date Due    Diabetic foot exam  Never done    Diabetic retinal exam  Never done    Diabetic microalbuminuria test  Never done    DTaP/Tdap/Td vaccine (1 - Tdap) Never done    Shingles Vaccine (1 of 2) Never done    Low dose CT lung screening  09/16/2022    A1C test (Diabetic or Prediabetic)  09/30/2022    Lipid screen  09/30/2022    Potassium monitoring  09/30/2022    Creatinine monitoring  09/30/2022    PSA counseling  09/30/2022    Pneumococcal 0-64 years Vaccine (2 of 2 - PPSV23) 02/25/2023    Colon cancer screen colonoscopy  08/11/2027    Flu vaccine  Completed    COVID-19 Vaccine  Completed    Hepatitis C screen  Completed    HIV screen  Completed    Hepatitis A vaccine  Aged Out    Hib vaccine  Aged Out    Meningococcal (ACWY) vaccine  Aged Out       Subjective:      Review of Systems   Constitutional: Negative for chills, fatigue and fever. HENT: Negative for congestion, rhinorrhea and sinus pain. Eyes: Negative for pain. Respiratory: Negative for cough and shortness of breath. Cardiovascular: Negative for chest pain and leg swelling. Gastrointestinal: Positive for abdominal pain.  Negative for constipation, diarrhea, nausea and vomiting. Genitourinary: Negative for difficulty urinating, enuresis and testicular pain. Musculoskeletal: Negative for arthralgias, back pain and myalgias. Skin: Negative for rash. Neurological: Negative for dizziness and headaches. Psychiatric/Behavioral: Negative for confusion and sleep disturbance. The patient is not nervous/anxious. All other systems reviewed and are negative. Objective:     Physical Exam  Vitals and nursing note reviewed. Constitutional:       General: He is not in acute distress. Appearance: Normal appearance. HENT:      Head: Normocephalic. Mouth/Throat:      Mouth: Mucous membranes are moist.   Eyes:      Extraocular Movements: Extraocular movements intact. Conjunctiva/sclera: Conjunctivae normal.      Pupils: Pupils are equal, round, and reactive to light. Cardiovascular:      Rate and Rhythm: Normal rate and regular rhythm. Pulses: Normal pulses. Heart sounds: Normal heart sounds. Pulmonary:      Effort: Pulmonary effort is normal.      Breath sounds: Normal breath sounds. Abdominal:      General: Abdomen is flat. Bowel sounds are normal.      Palpations: Abdomen is soft. Tenderness: There is abdominal tenderness (mild discomfort to LLQ). There is no right CVA tenderness, left CVA tenderness, guarding or rebound. Musculoskeletal:      Cervical back: Normal range of motion. Right lower leg: No edema. Left lower leg: No edema. Lymphadenopathy:      Cervical: No cervical adenopathy. Skin:     General: Skin is warm. Capillary Refill: Capillary refill takes less than 2 seconds. Neurological:      General: No focal deficit present. Mental Status: He is alert and oriented to person, place, and time.    Psychiatric:         Mood and Affect: Mood normal.         Behavior: Behavior normal.       /72 (Site: Left Upper Arm, Position: Sitting, Cuff Size: Medium Adult)   Pulse 100   Resp 16   Ht 5' 8\" (1.727 m)   Wt 217 lb 12.8 oz (98.8 kg)   SpO2 (!) 88%   BMI 33.12 kg/m²     Assessment:       ICD-10-CM    1. Primary osteoarthritis of both hips  M16.0 oxyCODONE-acetaminophen (PERCOCET) 5-325 MG per tablet   2. Acute right-sided low back pain without sciatica  M54.5 oxyCODONE-acetaminophen (PERCOCET) 5-325 MG per tablet   3. Screening PSA (prostate specific antigen)  Z12.5 PSA Screening   4. Type 2 diabetes mellitus with other specified complication, unspecified whether long term insulin use (HCC)  E11.69 CBC Auto Differential     Comprehensive Metabolic Panel     Hemoglobin A1C   5. High cholesterol  E78.00 Lipid Panel   6. Need for influenza vaccination  Z23 INFLUENZA, MDCK QUADV, 2 YRS AND OLDER, IM, PF, PREFILL SYR OR SDV, 0.5ML (FLUCELVAX QUADV, PF)            Plan:       1,2. Rx Percocet to use sparingly for chronic pain. Discussed primarily using Tylenol. 3.  Screen PSA.  4,5. Patient is due for several screening labs with known chronic medical illnesses. Patient has not been compliant with medication therapy. Advised patient that we can switch to lower Lipitor dose to see if he can tolerate. 6.  Patient agreeable to influenza vaccine. No follow-ups on file. Orders Placed This Encounter   Procedures    INFLUENZA, MDCK QUADV, 2 YRS AND OLDER, IM, PF, PREFILL SYR OR SDV, 0.5ML (FLUCELVAX QUADV, PF)    CBC Auto Differential     Standing Status:   Future     Number of Occurrences:   1     Standing Expiration Date:   9/30/2022    Lipid Panel     Standing Status:   Future     Number of Occurrences:   1     Standing Expiration Date:   12/30/2021     Order Specific Question:   Is Patient Fasting?/# of Hours     Answer:    Fast 8-10 hours    Comprehensive Metabolic Panel     Standing Status:   Future     Number of Occurrences:   1     Standing Expiration Date:   9/30/2022    Hemoglobin A1C     Standing Status:   Future     Number of Occurrences:   1     Standing Expiration Date:   9/30/2022  PSA Screening     Standing Status:   Future     Number of Occurrences:   1     Standing Expiration Date:   9/30/2022         Patient given educational materials - see patient instructions. Discussed use, benefit, and side effects of prescribedmedications. All patient questions answered. Pt voiced understanding. Reviewed health maintenance. Instructed to continue current medications, diet and exercise. Patient agreed with treatment plan. Follow up as directed.         Electronically signed by Ronnell Merlin, PA-C on 10/1/2021 at 10:15 AM

## 2021-10-01 ENCOUNTER — TELEPHONE (OUTPATIENT)
Dept: PRIMARY CARE CLINIC | Age: 61
End: 2021-10-01

## 2021-10-01 DIAGNOSIS — R94.4 DECREASED GFR: ICD-10-CM

## 2021-10-01 DIAGNOSIS — E78.00 HIGH CHOLESTEROL: Primary | ICD-10-CM

## 2021-10-01 DIAGNOSIS — R97.20 ELEVATED PSA: Primary | ICD-10-CM

## 2021-10-01 RX ORDER — OXYCODONE HYDROCHLORIDE AND ACETAMINOPHEN 5; 325 MG/1; MG/1
1 TABLET ORAL EVERY 4 HOURS PRN
Qty: 21 TABLET | Refills: 0 | Status: SHIPPED | OUTPATIENT
Start: 2021-10-01 | End: 2022-01-07 | Stop reason: SDUPTHER

## 2021-10-01 RX ORDER — ATORVASTATIN CALCIUM 20 MG/1
20 TABLET, FILM COATED ORAL DAILY
Qty: 30 TABLET | Refills: 0 | Status: SHIPPED | OUTPATIENT
Start: 2021-10-01

## 2021-10-01 ASSESSMENT — ENCOUNTER SYMPTOMS
SHORTNESS OF BREATH: 0
COUGH: 0
NAUSEA: 0
DIARRHEA: 0
RHINORRHEA: 0
EYE PAIN: 0
SINUS PAIN: 0
BACK PAIN: 0
ABDOMINAL PAIN: 1
VOMITING: 0
CONSTIPATION: 0

## 2021-10-01 NOTE — TELEPHONE ENCOUNTER
Please advise patient I have sent in atorvastatin 20 mg once daily. Please advise on result note of his labs so that he does not receive multiple phone calls today. Thank you.

## 2021-10-13 DIAGNOSIS — I10 ESSENTIAL HYPERTENSION: ICD-10-CM

## 2021-10-13 RX ORDER — LISINOPRIL AND HYDROCHLOROTHIAZIDE 20; 12.5 MG/1; MG/1
2 TABLET ORAL DAILY
Qty: 180 TABLET | Refills: 0 | Status: SHIPPED | OUTPATIENT
Start: 2021-10-13 | End: 2022-01-10

## 2021-11-17 ENCOUNTER — TELEPHONE (OUTPATIENT)
Dept: PRIMARY CARE CLINIC | Age: 61
End: 2021-11-17

## 2021-11-17 DIAGNOSIS — J06.9 UPPER RESPIRATORY TRACT INFECTION, UNSPECIFIED TYPE: Primary | ICD-10-CM

## 2021-11-17 RX ORDER — AZITHROMYCIN 250 MG/1
TABLET, FILM COATED ORAL
Qty: 6 TABLET | Refills: 0 | Status: SHIPPED | OUTPATIENT
Start: 2021-11-17 | End: 2021-11-27

## 2021-11-17 NOTE — TELEPHONE ENCOUNTER
Patient called in office, is requesting a zpack be sent in to pharmacy.  States that he is having headache, mucous, cough, nasal congestion    States he took an at home covid test that was negative    Can be send to [facility]

## 2021-11-17 NOTE — TELEPHONE ENCOUNTER
Prescription has been sent to pharmacy. Please advise that if he does not improve I recommend evaluation in office.

## 2021-11-29 ENCOUNTER — OFFICE VISIT (OUTPATIENT)
Dept: FAMILY MEDICINE CLINIC | Age: 61
End: 2021-11-29
Payer: COMMERCIAL

## 2021-11-29 ENCOUNTER — HOSPITAL ENCOUNTER (OUTPATIENT)
Dept: GENERAL RADIOLOGY | Age: 61
Discharge: HOME | End: 2021-12-01
Payer: COMMERCIAL

## 2021-11-29 ENCOUNTER — HOSPITAL ENCOUNTER (OUTPATIENT)
Age: 61
Discharge: HOME | End: 2021-12-01
Payer: COMMERCIAL

## 2021-11-29 VITALS
DIASTOLIC BLOOD PRESSURE: 76 MMHG | HEART RATE: 79 BPM | SYSTOLIC BLOOD PRESSURE: 131 MMHG | WEIGHT: 217 LBS | BODY MASS INDEX: 32.99 KG/M2 | OXYGEN SATURATION: 92 % | TEMPERATURE: 98.4 F | RESPIRATION RATE: 16 BRPM

## 2021-11-29 DIAGNOSIS — M62.830 BACK MUSCLE SPASM: ICD-10-CM

## 2021-11-29 DIAGNOSIS — I10 ESSENTIAL HYPERTENSION: ICD-10-CM

## 2021-11-29 DIAGNOSIS — M54.50 ACUTE RIGHT-SIDED LOW BACK PAIN WITHOUT SCIATICA: ICD-10-CM

## 2021-11-29 DIAGNOSIS — M54.50 ACUTE RIGHT-SIDED LOW BACK PAIN WITHOUT SCIATICA: Primary | ICD-10-CM

## 2021-11-29 DIAGNOSIS — Z76.0 MEDICATION REFILL: ICD-10-CM

## 2021-11-29 PROCEDURE — 72100 X-RAY EXAM L-S SPINE 2/3 VWS: CPT

## 2021-11-29 PROCEDURE — 99214 OFFICE O/P EST MOD 30 MIN: CPT | Performed by: NURSE PRACTITIONER

## 2021-11-29 PROCEDURE — 3017F COLORECTAL CA SCREEN DOC REV: CPT | Performed by: NURSE PRACTITIONER

## 2021-11-29 PROCEDURE — G8417 CALC BMI ABV UP PARAM F/U: HCPCS | Performed by: NURSE PRACTITIONER

## 2021-11-29 PROCEDURE — G8427 DOCREV CUR MEDS BY ELIG CLIN: HCPCS | Performed by: NURSE PRACTITIONER

## 2021-11-29 PROCEDURE — 1036F TOBACCO NON-USER: CPT | Performed by: NURSE PRACTITIONER

## 2021-11-29 PROCEDURE — G8482 FLU IMMUNIZE ORDER/ADMIN: HCPCS | Performed by: NURSE PRACTITIONER

## 2021-11-29 RX ORDER — AMLODIPINE BESYLATE 10 MG/1
10 TABLET ORAL NIGHTLY
Qty: 90 TABLET | Refills: 0 | Status: SHIPPED | OUTPATIENT
Start: 2021-11-29 | End: 2022-01-07

## 2021-11-29 RX ORDER — TIZANIDINE 4 MG/1
4 TABLET ORAL EVERY 8 HOURS PRN
Qty: 15 TABLET | Refills: 0 | Status: SHIPPED | OUTPATIENT
Start: 2021-11-29 | End: 2021-12-04

## 2021-11-29 ASSESSMENT — ENCOUNTER SYMPTOMS
COLOR CHANGE: 0
BACK PAIN: 1
VOMITING: 0
NAUSEA: 0

## 2021-11-29 NOTE — PROGRESS NOTES
[address] [facility]ALK-[facility]  [address] [place]  Dept: [phone]  Dept Fax: [phone]    Gunjan Valentin is a 59 y.o. male who presents today for his medical conditions/complaints of   Chief Complaint   Patient presents with    Back Pain     lower back pain onset 1 week. Pt reports has tried tens unit, sauna, and  heat    Other     PCP is off next week and was told to come into walk in for an xray order. HPI:     /76 (Site: Left Upper Arm, Position: Sitting, Cuff Size: Medium Adult)   Pulse 79   Temp 98.4 °F (36.9 °C) (Temporal)   Resp 16   Wt 217 lb (98.4 kg)   SpO2 92% Comment: sees Dr. Misty Zuniga for pulm and wears O2. Is aware of low O2  BMI 32.99 kg/m²       HPI    Patient presented to the urgent care with c/o low back pain x 10 days. This is a new problem. The pain occurs constantly. The problem is unchanged. The pain is present in the lumbar spine. The quality of the pain is described as sharp. The pain does not radiate. The pain is at a severity of 7/10. The pain is moderate. The symptoms are aggravated by movement. Associated symptoms include:  Muscle spasm. Pertinent negatives include no bladder incontinence, bowel incontinence, fever, headaches, numbness, tingling, dysuria. Risk factors: No recent trauma. Pt has tried ice, heat and motrin for the symptoms. The treatment provided little relief. History of hip replacement. Patient cannot think of any specific triggers to the pain but did fix faucet under cabinet and pain started the next day. Denies any previous injury to the back. Pt states he was told by PCP to come in for x-ray and for BP medication refills. Past Medical History:   Diagnosis Date    Adenomatous polyp 8/15/2017    Repeat colonoscopy recommended to patient in 2020.     Chronic headache 11/6/2014    Chronic intermittent hypoxia with obstructive sleep apnea     Chronic neck pain 11/6/2014  Colitis 5/8/2017    CT scan dated May 6, 2017 reviewed.  Colon polyp     COPD (chronic obstructive pulmonary disease) ([facility]     Full dentures     Upper & Lower    Headache 12/7/2011    See's neurology.  Heart burn     GERD    Hepatic steatosis 6/2/2017    High cholesterol 6/25/2014    Hip arthritis 11/7/2014    Hyperlipidemia     Hypertension     On Meds    Impaired fasting glucose 12/3/2015    Monitor     Laryngeal cancer [facility])     Consultant: Dr. Joe Chino dated June 11, 2015 from Dr. Clive Hawk reviewed on 07/24/2015, see Media tab of Chart Review for more information. Correspondence dated September 23, 2015 from Dr. Ernestine Simmons reviewed on 9/24/2015, see Media tab of Chart Review for more informatiion Correspondence dated October 4, 2016 from H&P from [facility] reviewed on 10/4/2016, see Media tab of Chart Review     Larynx cancer ([facility] [address] 2007    On Rad. Tx.   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 Ernestine Simmons reviewed on 9/13/16, see Media tab of Chart Review for more information.  Migraines     Mixed hyperlipidemia 11/5/2018    Neck pain 4/16/2012    Obstructive sleep apnea 3/15/2013    Clinical Polysomnography dated January 29, 2013 and February 3, 2013 from Dr. Remigio Lee reviewed on 03/15/2013 see Media tab of Chart Review for more information. Correspondence dated 4/30/13 from Dr. Janel Lopez reviewed on 05/02/2013, see Media tab of Chart Review for more information.  On home oxygen therapy 2013    2L/NC nightly    Osteoarthritis     rt. hip pain    Prediabetes 9/6/2019    Rectal polyp 8/11/2017    Repeat colonoscopy due on or around August 11, 2022    Reflux laryngitis 5/22/2013    Correspondence dated May 21, 2013 from Dr. Ernestine Simmons reviewed on 05/22/2013, see Media tab of Chart Review for more information. Correspondence dated May 17, 2018 from Dr. Ernestine Simmons reviewed on 5/17/18, see Media tab of Chart Review for more information.      S/P colonoscopy with polypectomy 2017    Sleep apnea     CPAP with Oxygen nightly    Vocal cord polyp 2013    Correspondence dated 4/15/13 from Dr. Audrey Bolivar reviewed on 2013 see Media tab of Chart Review for more information. Correspondence dated 13 from Dr. Audrey Bolivar reviewed on 2013 see Media tab of Chart Review for more information.  Wears eyeglasses         Past Surgical History:   Procedure Laterality Date    CATARACT REMOVAL Bilateral     MULTIPLE MORE ON LT    COLONOSCOPY      5 Polyps removed    COLONOSCOPY  2017    with biopsy    HIP ARTHROPLASTY Right 14    LARYNGOSCOPY  10/17/2016    with biopsies    MICROLARYNGOSCOPY W BIOPSY      TX COLSC FLX W/REMOVAL LESION BY HOT BX FORCEPS N/A 2017    COLONOSCOPY POLYPECTOMY HOT BIOPSY performed by Thien Almaguer MD at [address] [place] [address]       Family History   Problem Relation Age of Onset    Stomach Cancer Mother     Lung Cancer Mother     Lung Cancer Brother     Esophageal Cancer Brother        Social History     Tobacco Use    Smoking status: Former Smoker     Packs/day: 2.00     Years: 45.00     Pack years: 90.00     Types: Cigarettes     Start date: 10/4/1970     Quit date: 2013     Years since quittin.5    Smokeless tobacco: Never Used    Tobacco comment: quit 3 yrs ago   Substance Use Topics    Alcohol use: No     Alcohol/week: 0.0 standard drinks        Prior to Visit Medications    Medication Sig Taking?  Authorizing Provider   amLODIPine (NORVASC) 10 MG tablet Take 1 tablet by mouth nightly Yes Cydne Spotted, APRN - CNP   tiZANidine (ZANAFLEX) 4 MG tablet Take 1 tablet by mouth every 8 hours as needed (muscle spasm/pain) Yes Cydne Spotted, APRN - CNP   lisinopril-hydroCHLOROthiazide (PRINZIDE;ZESTORETIC) 20-12.5 MG per tablet take 2 tablets by mouth daily  Andry Maier PA-C   atorvastatin (LIPITOR) 20 MG tablet Take 1 tablet by mouth daily  Andry Maier PA-C   aspirin (RA ASPIRIN EC) 81 MG EC tablet take 1 tablet by mouth once daily  Andry Maier PA-C   ibuprofen (ADVIL;MOTRIN) 800 MG tablet take 1 tablet by mouth every 8 hours if needed for pain  Historical Provider, MD   furosemide (LASIX) 40 MG tablet Take 1 tablet by mouth daily  Dasia Genao MD   Blood Pressure Monitoring (SPHYGMOMANOMETER) MISC Use it daily as needed  Dasia Genao MD   pantoprazole (PROTONIX) 40 MG tablet take 1 tablet by mouth daily  Dasia Genao MD   guaiFENesin (MUCINEX) 600 MG extended release tablet take 1 tablet by mouth twice a day if needed for congestion  Dasia Genao MD   Handicap Placard MISC by Does not apply route Expires 2/13/2025  Diagnosis: COPD  Nick Garzon, DO       No Known Allergies      Subjective:      Review of Systems   Constitutional: Negative for chills and fever. Gastrointestinal: Negative for nausea and vomiting. Genitourinary: Negative for decreased urine volume and difficulty urinating. Musculoskeletal: Positive for back pain. Negative for gait problem and neck pain. Skin: Negative for color change. Neurological: Negative for weakness, light-headedness, numbness and headaches. Objective:     Physical Exam  Vitals and nursing note reviewed. Constitutional:       General: He is not in acute distress. Appearance: Normal appearance. HENT:      Head: Normocephalic and atraumatic. Right Ear: Ear canal and external ear normal.      Left Ear: Ear canal and external ear normal.      Nose: Nose normal.      Mouth/Throat:      Mouth: Mucous membranes are moist.   Eyes:      Extraocular Movements: Extraocular movements intact. Conjunctiva/sclera: Conjunctivae normal.      Pupils: Pupils are equal, round, and reactive to light. Cardiovascular:      Rate and Rhythm: Normal rate and regular rhythm. Pulses: Normal pulses.    Pulmonary:      Effort: Pulmonary effort is normal.   Abdominal:      General: Bowel sounds are normal. Palpations: Abdomen is soft. Musculoskeletal:      Cervical back: Normal range of motion and neck supple. Lumbar back: Spasms and tenderness present. No swelling, edema or bony tenderness. Decreased range of motion (due to pain). Negative right straight leg raise test and negative left straight leg raise test.        Back:       Comments: Patient has no cervical, thoracic or lumbar spinous process step-offs. Patient has extension, abduction, adduction. Patient has 5/5 hip flexor, quadricep hamstring dorsi plantar flexion strength bilaterally. 2+ patellar reflexes bilaterally. Sensation grossly intact in bilateral upper and lower extremities. Calf soft, nontender, non-erythematous. No pedal edema bilaterally. 2+ radial and DP pulses bilaterally. Ambulates with steady gait. Skin:     General: Skin is warm and dry. Capillary Refill: Capillary refill takes less than 2 seconds. Neurological:      Mental Status: He is alert and oriented to person, place, and time. Gait: Gait normal.   Psychiatric:         Mood and Affect: Mood normal.         Behavior: Behavior normal.           MEDICAL DECISION MAKING Assessment/Plan:     Bony Estes was seen today for back pain and other. Diagnoses and all orders for this visit:    Acute right-sided low back pain without sciatica  -     tiZANidine (ZANAFLEX) 4 MG tablet; Take 1 tablet by mouth every 8 hours as needed (muscle spasm/pain)  -     XR LUMBAR SPINE (2-3 VIEWS); Future    Essential hypertension  -     amLODIPine (NORVASC) 10 MG tablet; Take 1 tablet by mouth nightly    Back muscle spasm  -     tiZANidine (ZANAFLEX) 4 MG tablet; Take 1 tablet by mouth every 8 hours as needed (muscle spasm/pain)  -     XR LUMBAR SPINE (2-3 VIEWS);  Future    Medication refill        Results for orders placed or performed during the hospital encounter of 09/30/21   PSA Screening   Result Value Ref Range    PSA 4.54 (H) <4.1 ug/L   Hemoglobin A1C   Result Value Ref Range    Hemoglobin A1C 7.1 (H) 4.0 - 6.0 %    Estimated Avg Glucose 157 mg/dL   Comprehensive Metabolic Panel   Result Value Ref Range    Glucose 89 70 - 99 mg/dL    BUN 69 (H) 8 - 23 mg/dL    CREATININE 2.61 (H) 0.70 - 1.20 mg/dL    Bun/Cre Ratio NOT REPORTED 9 - 20    Calcium 9.8 8.6 - 10.4 mg/dL    Sodium 141 135 - 144 mmol/L    Potassium 5.6 (H) 3.7 - 5.3 mmol/L    Chloride 101 98 - 107 mmol/L    CO2 24 20 - 31 mmol/L    Anion Gap 16 9 - 17 mmol/L    Alkaline Phosphatase 88 40 - 129 U/L    ALT 19 5 - 41 U/L    AST 15 <40 U/L    Total Bilirubin 0.51 0.3 - 1.2 mg/dL    Total Protein 8.2 6.4 - 8.3 g/dL    Albumin 4.6 3.5 - 5.2 g/dL    Albumin/Globulin Ratio 1.3 1.0 - 2.5    GFR Non- 25 (L) >60 mL/min    GFR African American 30 (L) >60 mL/min    GFR Comment          GFR Staging NOT REPORTED    Lipid Panel   Result Value Ref Range    Cholesterol 217 (H) <200 mg/dL    HDL 35 (L) >40 mg/dL    LDL Cholesterol 138 (H) 0 - 130 mg/dL    Chol/HDL Ratio 6.2 (H) <5    Triglycerides 218 (H) <150 mg/dL    VLDL NOT REPORTED (H) 1 - 30 mg/dL   CBC Auto Differential   Result Value Ref Range    WBC 5.7 3.5 - 11.3 k/uL    RBC 5.35 4.21 - 5.77 m/uL    Hemoglobin 13.4 13.0 - 17.0 g/dL    Hematocrit 44.4 40.7 - 50.3 %    MCV 83.0 82.6 - 102.9 fL    MCH 25.0 (L) 25.2 - 33.5 pg    MCHC 30.2 28.4 - 34.8 g/dL    RDW 16.1 (H) 11.8 - 14.4 %    Platelets 053 749 - 958 k/uL    MPV 12.1 8.1 - 13.5 fL    NRBC Automated 0.0 0.0 per 100 WBC    Differential Type NOT REPORTED     Seg Neutrophils 54 36 - 65 %    Lymphocytes 23 (L) 24 - 43 %    Monocytes 15 (H) 3 - 12 %    Eosinophils % 6 (H) 1 - 4 %    Basophils 1 0 - 2 %    Immature Granulocytes 1 (H) 0 %    Segs Absolute 3.12 1.50 - 8.10 k/uL    Absolute Lymph # 1.29 1.10 - 3.70 k/uL    Absolute Mono # 0.86 0.10 - 1.20 k/uL    Absolute Eos # 0.31 0.00 - 0.44 k/uL    Basophils Absolute 0.06 0.00 - 0.20 k/uL    Absolute Immature Granulocyte 0.04 0.00 - 0.30 k/uL    WBC Morphology NOT REPORTED     RBC Morphology ANISOCYTOSIS PRESENT     Platelet Estimate NOT REPORTED      The patient presents with low back pain without signs of spinal cord compression, cauda equina syndrome, infection, aneurysm or other serious etiology. The patient is neurologically intact. Will check x-ray L/S spine. Will call with results. Will start on Zanaflex PRN. Continue on Ibuprofen as you were prior to coming in today. Return if no improvement. Go to the ER fore any emergent concern. Refill provided on Norvasc. Plan follow up with PCP. X-ray reviewed:  FINDINGS:  A moderate levo scoliotic curvature of the lumbar spine is noted without  acute fracture. Grade 1 retrolisthesis L3 upon L4 is noted. Diffuse  moderate degenerative and degenerative disc changes are noted. Patient has  irregularity anterior superior aspect of L3 favoring limbus vertebra over  sequelae of remote trauma. Pedicles are intact. SI joints are symmetrical.  The aorta is calcified without evidence of aneurysm.     IMPRESSION:  Levo scoliotic curvature. Degenerative and degenerative disc changes without  acute fracture. Other findings as above. Plan to refer to ortho/spine specialist.  Will discuss with PCP.      Patient given educational materials - see patientinstructions. Discussed use, benefit, and side effects of prescribed medications. All patient questions answered. Pt verbalized understanding. Instructed to continue current medications, diet and exercise. Patient agreed with treatment plan. Follow up as directed.      Electronically signed by ALFREDITO Kaur CNP on 11/29/2021 at 1:54 PM

## 2021-11-29 NOTE — PATIENT INSTRUCTIONS
Patient Education        Back Spasm: Care Instructions  Your Care Instructions  A back spasm is sudden tightness and pain in your back muscles. It may happen from overuse or an injury. Things like sleeping in an awkward way, bending, lifting, standing, or sitting can sometimes cause a spasm. But the cause isn't always clear. Home treatment includes using heat or ice, taking over-the-counter (OTC) pain medicines, and avoiding activities that may cause back pain. For a back spasm that doesn't get better with home care, your doctor may prescribe medicine. Treatments such as massage or manipulation may also help ease a back spasm. Your doctor may also suggest exercise or physical therapy to help improve strength and flexibility in your back muscles. In most cases, getting back to your normal activities is good for your back. Just make sure to avoid doing things that make your pain worse. Follow-up care is a key part of your treatment and safety. Be sure to make and go to all appointments, and call your doctor if you are having problems. It's also a good idea to know your test results and keep a list of the medicines you take. How can you care for yourself at home? Heat, ice, and medicines    · To relieve pain, use heat or ice (whichever feels better) on the affected area. ? Put a warm water bottle, a heating pad set on low, or a warm cloth on your back. Put a thin cloth between the heating pad and your skin. Do not go to sleep with a heating pad on your skin. ? Try ice or a cold pack on the area for 10 to 20 minutes at a time. Put a thin cloth between the ice and your skin.     · For most back pain you can take over-the-counter pain medicine. Nonsteroidal anti-inflammatory drugs (NSAIDs) such as ibuprofen or naproxen seem to work best. But if you can't take NSAIDs you can try acetaminophen. Your doctor can prescribe stronger medicines if needed. Be safe with medicines.  Read and follow all instructions on the label.   Body positions and posture    · Sit or lie in positions that are most comfortable for you and that reduce pain. Try one of these positions when you lie down:  ? Lie on your back with your knees bent and supported by large pillows. ? Lie on the floor with your legs on the seat of a sofa or chair. ? Lie on your side with your knees and hips bent and a pillow between your legs. ? Lie on your stomach if it does not make pain worse.     · Do not sit up in bed. Avoid soft couches and twisted positions.     · Avoid bed rest after the first day of back pain. Bed rest can help relieve pain at first, but it delays healing. Continued rest without activity is usually not good for your back.     · If you must sit for long periods of time, take breaks from sitting. Change positions every 30 minutes. Get up and walk around, or lie in a comfortable position. Activity    · Take short walks several times a day. You can start with 5 to 10 minutes, 3 or 4 times a day, and work up to longer walks. Walk on level surfaces and avoid hills and stairs until your back starts to feel better.     · After your back spasm starts to feel better, try to stretch your muscles every day, especially before and after exercise and at bedtime. Regular stretching can help relax your muscles.     · To prevent future back pain, do exercises to stretch and strengthen your back and stomach. Learn to use good posture, safe lifting techniques, and other ways to move to help you avoid back pain. When should you call for help? Call 911 anytime you think you may need emergency care. For example, call if:    · You are unable to move an arm or a leg at all. Call your doctor now or seek immediate medical care if:    · You have new or worse symptoms in your legs, belly, or buttocks. Symptoms may include:  ? Numbness or tingling. ? Weakness. ? Pain.     · You lose bladder or bowel control.    Watch closely for changes in your health, and be sure to contact your doctor if:    · You have a fever, lose weight, or don't feel well.     · You do not get better as expected. Where can you learn more? Go to https://[place].TapIn.tv. org and sign in to your [employer] account. Enter E232 in the [employer] box to learn more about \"Back Spasm: Care Instructions. \"     If you do not have an account, please click on the \"Sign Up Now\" link. Current as of: July 1, 2021               Content Version: 13.0  © 9829-7500 [employer]. Care instructions adapted under license by [facility] ([facility]). If you have questions about a medical condition or this instruction, always ask your healthcare professional. [place] [address] any warranty or liability for your use of this information. Patient Education        Back Pain: Care Instructions  Your Care Instructions     Back pain has many possible causes. It is often related to problems with muscles and ligaments of the back. It may also be related to problems with the nerves, discs, or bones of the back. Moving, lifting, standing, sitting, or sleeping in an awkward way can strain the back. Sometimes you don't notice the injury until later. Arthritis is another common cause of back pain. Although it may hurt a lot, back pain usually improves on its own within several weeks. Most people recover in 12 weeks or less. Using good home treatment and being careful not to stress your back can help you feel better sooner. Follow-up care is a key part of your treatment and safety. Be sure to make and go to all appointments, and call your doctor if you are having problems. It's also a good idea to know your test results and keep a list of the medicines you take. How can you care for yourself at home? · Sit or lie in positions that are most comfortable and reduce your pain. Try one of these positions when you lie down:  ?  Lie on your back with your knees bent and supported by large pillows. ? Lie on the floor with your legs on the seat of a sofa or chair. ? Lie on your side with your knees and hips bent and a pillow between your legs. ? Lie on your stomach if it does not make pain worse. · Do not sit up in bed, and avoid soft couches and twisted positions. Bed rest can help relieve pain at first, but it delays healing. Avoid bed rest after the first day of back pain. · Change positions every 30 minutes. If you must sit for long periods of time, take breaks from sitting. Get up and walk around, or lie in a comfortable position. · Try using a heating pad on a low or medium setting for 15 to 20 minutes every 2 or 3 hours. Try a warm shower in place of one session with the heating pad. · You can also try an ice pack for 10 to 15 minutes every 2 to 3 hours. Put a thin cloth between the ice pack and your skin. · Take pain medicines exactly as directed. ? If the doctor gave you a prescription medicine for pain, take it as prescribed. ? If you are not taking a prescription pain medicine, ask your doctor if you can take an over-the-counter medicine. · Take short walks several times a day. You can start with 5 to 10 minutes, 3 or 4 times a day, and work up to longer walks. Walk on level surfaces and avoid hills and stairs until your back is better. · Return to work and other activities as soon as you can. Continued rest without activity is usually not good for your back. · To prevent future back pain, do exercises to stretch and strengthen your back and stomach. Learn how to use good posture, safe lifting techniques, and proper body mechanics. When should you call for help? Call your doctor now or seek immediate medical care if:    · You have new or worsening numbness in your legs.     · You have new or worsening weakness in your legs. (This could make it hard to stand up.)     · You lose control of your bladder or bowels.    Watch closely for changes in your health, and be sure to contact your doctor if:    · You have a fever, lose weight, or don't feel well.     · You do not get better as expected. Where can you learn more? Go to https://[employer] [place] [employer] Sprout. org and sign in to your [employer] account. Enter X174 in the [employer] box to learn more about \"Back Pain: Care Instructions. \"     If you do not have an account, please click on the \"Sign Up Now\" link. Current as of: July 1, 2021               Content Version: 13.0  © 4852-9774 [employer]. Care instructions adapted under license by [facility] ([facility]). If you have questions about a medical condition or this instruction, always ask your healthcare professional. [address] any warranty or liability for your use of this information.

## 2021-11-30 ENCOUNTER — PATIENT MESSAGE (OUTPATIENT)
Dept: FAMILY MEDICINE CLINIC | Age: 61
End: 2021-11-30

## 2021-11-30 DIAGNOSIS — M54.50 ACUTE RIGHT-SIDED LOW BACK PAIN WITHOUT SCIATICA: Primary | ICD-10-CM

## 2021-11-30 DIAGNOSIS — M43.10 RETROLISTHESIS OF VERTEBRAE: ICD-10-CM

## 2021-11-30 DIAGNOSIS — M62.830 BACK MUSCLE SPASM: ICD-10-CM

## 2021-11-30 NOTE — PROGRESS NOTES
I would recommend patient complete physical therapy and then obtain MRI prior to sending for spine specialist. Please let me know if patient is agreeable.

## 2021-12-01 NOTE — PROGRESS NOTES
Berry Cordova,    I have placed a MRI order for patient. Pending those results will refer to Spine Surgeon. Thank you. Also, I am not quite sure on who advised him to see walk-in for back pain. I would have definitely seen him.

## 2021-12-01 NOTE — TELEPHONE ENCOUNTER
Phone call to pt due to xray results and pcp's recommendation for PT.  Pt states he is doing everything right now that PT would do and declines PT eval/referral.

## 2021-12-01 NOTE — TELEPHONE ENCOUNTER
Please advise patient I placed order for lumbar spine MRI. I would like him to follow-up after MRI results.

## 2021-12-07 DIAGNOSIS — J04.0 REFLUX LARYNGITIS: ICD-10-CM

## 2021-12-07 DIAGNOSIS — K21.9 REFLUX LARYNGITIS: ICD-10-CM

## 2021-12-08 ENCOUNTER — HOSPITAL ENCOUNTER (OUTPATIENT)
Dept: MRI IMAGING | Facility: CLINIC | Age: 61
Discharge: HOME | End: 2021-12-10
Payer: COMMERCIAL

## 2021-12-08 DIAGNOSIS — M54.50 ACUTE RIGHT-SIDED LOW BACK PAIN WITHOUT SCIATICA: ICD-10-CM

## 2021-12-08 DIAGNOSIS — M43.10 RETROLISTHESIS OF VERTEBRAE: ICD-10-CM

## 2021-12-08 DIAGNOSIS — M62.830 BACK MUSCLE SPASM: ICD-10-CM

## 2021-12-08 PROCEDURE — 72148 MRI LUMBAR SPINE W/O DYE: CPT

## 2021-12-08 RX ORDER — PANTOPRAZOLE SODIUM 40 MG/1
TABLET, DELAYED RELEASE ORAL
Qty: 90 TABLET | Refills: 1 | Status: SHIPPED | OUTPATIENT
Start: 2021-12-08 | End: 2022-05-25

## 2021-12-09 DIAGNOSIS — I25.10 CORONARY ARTERY DISEASE INVOLVING NATIVE CORONARY ARTERY OF NATIVE HEART WITHOUT ANGINA PECTORIS: ICD-10-CM

## 2021-12-09 RX ORDER — ASPIRIN 81 MG/1
TABLET ORAL
Qty: 90 TABLET | Refills: 0 | Status: SHIPPED | OUTPATIENT
Start: 2021-12-09 | End: 2022-03-14

## 2021-12-21 RX ORDER — FUROSEMIDE 40 MG/1
40 TABLET ORAL DAILY
Qty: 60 TABLET | Refills: 3 | Status: SHIPPED | OUTPATIENT
Start: 2021-12-21 | End: 2022-01-07 | Stop reason: DRUGHIGH

## 2022-01-01 ENCOUNTER — OFFICE VISIT (OUTPATIENT)
Dept: PODIATRY | Age: 62
End: 2022-01-01
Payer: COMMERCIAL

## 2022-01-01 ENCOUNTER — HOSPITAL ENCOUNTER (OUTPATIENT)
Dept: NUCLEAR MEDICINE | Age: 62
Discharge: HOME | End: 2022-10-30

## 2022-01-01 ENCOUNTER — TELEPHONE (OUTPATIENT)
Dept: PRIMARY CARE CLINIC | Age: 62
End: 2022-01-01

## 2022-01-01 ENCOUNTER — HOSPITAL ENCOUNTER (OUTPATIENT)
Dept: NON INVASIVE DIAGNOSTICS | Age: 62
Discharge: HOME | End: 2022-10-30
Payer: COMMERCIAL

## 2022-01-01 ENCOUNTER — TELEPHONE (OUTPATIENT)
Dept: PULMONOLOGY | Age: 62
End: 2022-01-01

## 2022-01-01 ENCOUNTER — HOSPITAL ENCOUNTER (OUTPATIENT)
Dept: NUCLEAR MEDICINE | Age: 62
Discharge: HOME | End: 2022-10-30
Payer: COMMERCIAL

## 2022-01-01 ENCOUNTER — OFFICE VISIT (OUTPATIENT)
Dept: PRIMARY CARE CLINIC | Age: 62
End: 2022-01-01
Payer: COMMERCIAL

## 2022-01-01 VITALS
OXYGEN SATURATION: 84 % | DIASTOLIC BLOOD PRESSURE: 98 MMHG | HEART RATE: 97 BPM | BODY MASS INDEX: 35.73 KG/M2 | WEIGHT: 235 LBS | SYSTOLIC BLOOD PRESSURE: 144 MMHG

## 2022-01-01 VITALS — HEART RATE: 93 BPM | DIASTOLIC BLOOD PRESSURE: 93 MMHG | SYSTOLIC BLOOD PRESSURE: 146 MMHG

## 2022-01-01 VITALS — HEIGHT: 68 IN | BODY MASS INDEX: 35.61 KG/M2 | WEIGHT: 235 LBS

## 2022-01-01 DIAGNOSIS — M16.0 PRIMARY OSTEOARTHRITIS OF BOTH HIPS: ICD-10-CM

## 2022-01-01 DIAGNOSIS — M72.2 PLANTAR FASCIITIS, BILATERAL: Primary | ICD-10-CM

## 2022-01-01 DIAGNOSIS — I25.119 CORONARY ARTERY DISEASE INVOLVING NATIVE CORONARY ARTERY OF NATIVE HEART WITH ANGINA PECTORIS (HCC): ICD-10-CM

## 2022-01-01 DIAGNOSIS — M79.671 PAIN IN BOTH FEET: ICD-10-CM

## 2022-01-01 DIAGNOSIS — J96.11 CHRONIC HYPOXEMIC RESPIRATORY FAILURE (HCC): Primary | ICD-10-CM

## 2022-01-01 DIAGNOSIS — Z23 NEED FOR INFLUENZA VACCINATION: ICD-10-CM

## 2022-01-01 DIAGNOSIS — M79.672 PAIN IN BOTH FEET: ICD-10-CM

## 2022-01-01 DIAGNOSIS — I10 PRIMARY HYPERTENSION: ICD-10-CM

## 2022-01-01 DIAGNOSIS — E11.69 TYPE 2 DIABETES MELLITUS WITH OTHER SPECIFIED COMPLICATION, UNSPECIFIED WHETHER LONG TERM INSULIN USE (HCC): Primary | ICD-10-CM

## 2022-01-01 DIAGNOSIS — M54.50 ACUTE RIGHT-SIDED LOW BACK PAIN WITHOUT SCIATICA: ICD-10-CM

## 2022-01-01 LAB
HBA1C MFR BLD: 6.1 %
LV EF: 63 %
LVEF MODALITY: NORMAL

## 2022-01-01 PROCEDURE — G8427 DOCREV CUR MEDS BY ELIG CLIN: HCPCS | Performed by: PODIATRIST

## 2022-01-01 PROCEDURE — 90674 CCIIV4 VAC NO PRSV 0.5 ML IM: CPT | Performed by: PHYSICIAN ASSISTANT

## 2022-01-01 PROCEDURE — 1036F TOBACCO NON-USER: CPT | Performed by: PODIATRIST

## 2022-01-01 PROCEDURE — 6360000002 HC RX W HCPCS: Performed by: PHYSICIAN ASSISTANT

## 2022-01-01 PROCEDURE — 99203 OFFICE O/P NEW LOW 30 MIN: CPT | Performed by: PODIATRIST

## 2022-01-01 PROCEDURE — 2580000003 HC RX 258: Performed by: PHYSICIAN ASSISTANT

## 2022-01-01 PROCEDURE — 99214 OFFICE O/P EST MOD 30 MIN: CPT | Performed by: PHYSICIAN ASSISTANT

## 2022-01-01 PROCEDURE — 93017 CV STRESS TEST TRACING ONLY: CPT

## 2022-01-01 PROCEDURE — 3017F COLORECTAL CA SCREEN DOC REV: CPT | Performed by: PHYSICIAN ASSISTANT

## 2022-01-01 PROCEDURE — G8482 FLU IMMUNIZE ORDER/ADMIN: HCPCS | Performed by: PODIATRIST

## 2022-01-01 PROCEDURE — 78452 HT MUSCLE IMAGE SPECT MULT: CPT

## 2022-01-01 PROCEDURE — G8482 FLU IMMUNIZE ORDER/ADMIN: HCPCS | Performed by: PHYSICIAN ASSISTANT

## 2022-01-01 PROCEDURE — A9500 TC99M SESTAMIBI: HCPCS | Performed by: PHYSICIAN ASSISTANT

## 2022-01-01 PROCEDURE — 2022F DILAT RTA XM EVC RTNOPTHY: CPT | Performed by: PHYSICIAN ASSISTANT

## 2022-01-01 PROCEDURE — 3430000000 HC RX DIAGNOSTIC RADIOPHARMACEUTICAL: Performed by: PHYSICIAN ASSISTANT

## 2022-01-01 PROCEDURE — 1036F TOBACCO NON-USER: CPT | Performed by: PHYSICIAN ASSISTANT

## 2022-01-01 PROCEDURE — 3078F DIAST BP <80 MM HG: CPT | Performed by: PHYSICIAN ASSISTANT

## 2022-01-01 PROCEDURE — 83036 HEMOGLOBIN GLYCOSYLATED A1C: CPT | Performed by: PHYSICIAN ASSISTANT

## 2022-01-01 PROCEDURE — 3074F SYST BP LT 130 MM HG: CPT | Performed by: PHYSICIAN ASSISTANT

## 2022-01-01 PROCEDURE — G8417 CALC BMI ABV UP PARAM F/U: HCPCS | Performed by: PHYSICIAN ASSISTANT

## 2022-01-01 PROCEDURE — 3017F COLORECTAL CA SCREEN DOC REV: CPT | Performed by: PODIATRIST

## 2022-01-01 PROCEDURE — G8427 DOCREV CUR MEDS BY ELIG CLIN: HCPCS | Performed by: PHYSICIAN ASSISTANT

## 2022-01-01 PROCEDURE — G8417 CALC BMI ABV UP PARAM F/U: HCPCS | Performed by: PODIATRIST

## 2022-01-01 PROCEDURE — 3044F HG A1C LEVEL LT 7.0%: CPT | Performed by: PHYSICIAN ASSISTANT

## 2022-01-01 PROCEDURE — 90471 IMMUNIZATION ADMIN: CPT | Performed by: PHYSICIAN ASSISTANT

## 2022-01-01 RX ORDER — ALBUTEROL SULFATE 90 UG/1
2 AEROSOL, METERED RESPIRATORY (INHALATION) PRN
Status: ACTIVE | OUTPATIENT
Start: 2022-01-01 | End: 2022-01-01

## 2022-01-01 RX ORDER — AMINOPHYLLINE DIHYDRATE 25 MG/ML
50 INJECTION, SOLUTION INTRAVENOUS PRN
Status: ACTIVE | OUTPATIENT
Start: 2022-01-01 | End: 2022-01-01

## 2022-01-01 RX ORDER — AMLODIPINE BESYLATE 5 MG/1
5 TABLET ORAL DAILY
Qty: 90 TABLET | Refills: 1 | Status: SHIPPED | OUTPATIENT
Start: 2022-01-01

## 2022-01-01 RX ORDER — NITROGLYCERIN 0.4 MG/1
0.4 TABLET SUBLINGUAL EVERY 5 MIN PRN
Status: ACTIVE | OUTPATIENT
Start: 2022-01-01 | End: 2022-01-01

## 2022-01-01 RX ORDER — SODIUM CHLORIDE 0.9 % (FLUSH) 0.9 %
5-40 SYRINGE (ML) INJECTION PRN
Status: ACTIVE | OUTPATIENT
Start: 2022-01-01 | End: 2022-01-01

## 2022-01-01 RX ORDER — SODIUM CHLORIDE 9 MG/ML
500 INJECTION, SOLUTION INTRAVENOUS CONTINUOUS PRN
Status: ACTIVE | OUTPATIENT
Start: 2022-01-01 | End: 2022-01-01

## 2022-01-01 RX ORDER — ATROPINE SULFATE 0.1 MG/ML
0.5 INJECTION INTRAVENOUS EVERY 5 MIN PRN
Status: ACTIVE | OUTPATIENT
Start: 2022-01-01 | End: 2022-01-01

## 2022-01-01 RX ORDER — OXYCODONE HYDROCHLORIDE AND ACETAMINOPHEN 5; 325 MG/1; MG/1
1 TABLET ORAL EVERY 6 HOURS PRN
Qty: 30 TABLET | Refills: 0 | Status: SHIPPED | OUTPATIENT
Start: 2022-01-01 | End: 2022-01-01

## 2022-01-01 RX ORDER — SODIUM CHLORIDE 0.9 % (FLUSH) 0.9 %
10 SYRINGE (ML) INJECTION PRN
Status: DISCONTINUED | OUTPATIENT
Start: 2022-01-01 | End: 2022-01-01 | Stop reason: HOSPADM

## 2022-01-01 RX ORDER — METOPROLOL TARTRATE 5 MG/5ML
5 INJECTION INTRAVENOUS EVERY 5 MIN PRN
Status: ACTIVE | OUTPATIENT
Start: 2022-01-01 | End: 2022-01-01

## 2022-01-01 RX ADMIN — REGADENOSON 0.4 MG: 0.08 INJECTION, SOLUTION INTRAVENOUS at 09:00

## 2022-01-01 RX ADMIN — TETRAKIS(2-METHOXYISOBUTYLISOCYANIDE)COPPER(I) TETRAFLUOROBORATE 36.34 MILLICURIE: 1 INJECTION, POWDER, LYOPHILIZED, FOR SOLUTION INTRAVENOUS at 09:04

## 2022-01-01 RX ADMIN — SODIUM CHLORIDE, PRESERVATIVE FREE 10 ML: 5 INJECTION INTRAVENOUS at 09:00

## 2022-01-01 RX ADMIN — TETRAKIS(2-METHOXYISOBUTYLISOCYANIDE)COPPER(I) TETRAFLUOROBORATE 11.71 MILLICURIE: 1 INJECTION, POWDER, LYOPHILIZED, FOR SOLUTION INTRAVENOUS at 07:32

## 2022-01-01 RX ADMIN — SODIUM CHLORIDE, PRESERVATIVE FREE 10 ML: 5 INJECTION INTRAVENOUS at 07:32

## 2022-01-01 ASSESSMENT — ENCOUNTER SYMPTOMS
CONSTIPATION: 0
DIARRHEA: 0
SINUS PAIN: 0
VOMITING: 0
EYE PAIN: 0
RHINORRHEA: 0
COUGH: 0
BACK PAIN: 1
SHORTNESS OF BREATH: 1
NAUSEA: 0
ABDOMINAL PAIN: 0

## 2022-01-01 ASSESSMENT — PATIENT HEALTH QUESTIONNAIRE - PHQ9
SUM OF ALL RESPONSES TO PHQ9 QUESTIONS 1 & 2: 0
SUM OF ALL RESPONSES TO PHQ QUESTIONS 1-9: 0
SUM OF ALL RESPONSES TO PHQ QUESTIONS 1-9: 0
2. FEELING DOWN, DEPRESSED OR HOPELESS: 0
1. LITTLE INTEREST OR PLEASURE IN DOING THINGS: 0
SUM OF ALL RESPONSES TO PHQ QUESTIONS 1-9: 0
SUM OF ALL RESPONSES TO PHQ QUESTIONS 1-9: 0

## 2022-01-07 ENCOUNTER — OFFICE VISIT (OUTPATIENT)
Dept: PRIMARY CARE CLINIC | Age: 62
End: 2022-01-07
Payer: COMMERCIAL

## 2022-01-07 ENCOUNTER — HOSPITAL ENCOUNTER (OUTPATIENT)
Age: 62
Setting detail: SPECIMEN
Discharge: HOME | End: 2022-01-07

## 2022-01-07 VITALS
OXYGEN SATURATION: 88 % | HEART RATE: 88 BPM | DIASTOLIC BLOOD PRESSURE: 84 MMHG | BODY MASS INDEX: 33.62 KG/M2 | RESPIRATION RATE: 16 BRPM | HEIGHT: 68 IN | WEIGHT: 221.8 LBS | SYSTOLIC BLOOD PRESSURE: 132 MMHG

## 2022-01-07 DIAGNOSIS — M16.0 PRIMARY OSTEOARTHRITIS OF BOTH HIPS: ICD-10-CM

## 2022-01-07 DIAGNOSIS — R97.20 ELEVATED PSA: ICD-10-CM

## 2022-01-07 DIAGNOSIS — I10 PRIMARY HYPERTENSION: ICD-10-CM

## 2022-01-07 DIAGNOSIS — E11.69 TYPE 2 DIABETES MELLITUS WITH OTHER SPECIFIED COMPLICATION, UNSPECIFIED WHETHER LONG TERM INSULIN USE (HCC): Primary | ICD-10-CM

## 2022-01-07 DIAGNOSIS — R94.4 DECREASED GFR: ICD-10-CM

## 2022-01-07 DIAGNOSIS — M54.50 ACUTE RIGHT-SIDED LOW BACK PAIN WITHOUT SCIATICA: ICD-10-CM

## 2022-01-07 DIAGNOSIS — R97.20 ELEVATED PSA MEASUREMENT: Primary | ICD-10-CM

## 2022-01-07 DIAGNOSIS — C32.9 LARYNGEAL CANCER (HCC): ICD-10-CM

## 2022-01-07 DIAGNOSIS — J44.9 CHRONIC OBSTRUCTIVE PULMONARY DISEASE, UNSPECIFIED COPD TYPE (HCC): ICD-10-CM

## 2022-01-07 LAB
ANION GAP SERPL CALCULATED.3IONS-SCNC: 11 MMOL/L (ref 9–17)
BUN BLDV-MCNC: 57 MG/DL (ref 8–23)
BUN/CREAT BLD: ABNORMAL (ref 9–20)
CALCIUM SERPL-MCNC: 10.7 MG/DL (ref 8.6–10.4)
CHLORIDE BLD-SCNC: 101 MMOL/L (ref 98–107)
CO2: 29 MMOL/L (ref 20–31)
CREAT SERPL-MCNC: 1.64 MG/DL (ref 0.7–1.2)
GFR AFRICAN AMERICAN: 52 ML/MIN
GFR NON-AFRICAN AMERICAN: 43 ML/MIN
GFR SERPL CREATININE-BSD FRML MDRD: ABNORMAL ML/MIN/{1.73_M2}
GFR SERPL CREATININE-BSD FRML MDRD: ABNORMAL ML/MIN/{1.73_M2}
GLUCOSE BLD-MCNC: 98 MG/DL (ref 70–99)
HBA1C MFR BLD: 5.6 %
POTASSIUM SERPL-SCNC: 5.8 MMOL/L (ref 3.7–5.3)
PROSTATE SPECIFIC ANTIGEN: 4.31 UG/L
SODIUM BLD-SCNC: 141 MMOL/L (ref 135–144)

## 2022-01-07 PROCEDURE — 3017F COLORECTAL CA SCREEN DOC REV: CPT | Performed by: PHYSICIAN ASSISTANT

## 2022-01-07 PROCEDURE — G8427 DOCREV CUR MEDS BY ELIG CLIN: HCPCS | Performed by: PHYSICIAN ASSISTANT

## 2022-01-07 PROCEDURE — G8482 FLU IMMUNIZE ORDER/ADMIN: HCPCS | Performed by: PHYSICIAN ASSISTANT

## 2022-01-07 PROCEDURE — 83036 HEMOGLOBIN GLYCOSYLATED A1C: CPT | Performed by: PHYSICIAN ASSISTANT

## 2022-01-07 PROCEDURE — 3023F SPIROM DOC REV: CPT | Performed by: PHYSICIAN ASSISTANT

## 2022-01-07 PROCEDURE — 1036F TOBACCO NON-USER: CPT | Performed by: PHYSICIAN ASSISTANT

## 2022-01-07 PROCEDURE — 3044F HG A1C LEVEL LT 7.0%: CPT | Performed by: PHYSICIAN ASSISTANT

## 2022-01-07 PROCEDURE — G8417 CALC BMI ABV UP PARAM F/U: HCPCS | Performed by: PHYSICIAN ASSISTANT

## 2022-01-07 PROCEDURE — 2022F DILAT RTA XM EVC RTNOPTHY: CPT | Performed by: PHYSICIAN ASSISTANT

## 2022-01-07 PROCEDURE — 99214 OFFICE O/P EST MOD 30 MIN: CPT | Performed by: PHYSICIAN ASSISTANT

## 2022-01-07 RX ORDER — FUROSEMIDE 20 MG/1
20 TABLET ORAL DAILY
Qty: 30 TABLET | Refills: 2 | Status: SHIPPED | OUTPATIENT
Start: 2022-01-07 | End: 2022-03-26

## 2022-01-07 RX ORDER — IBUPROFEN 800 MG/1
TABLET ORAL
Qty: 120 TABLET | Status: CANCELLED | OUTPATIENT
Start: 2022-01-07

## 2022-01-07 RX ORDER — ALBUTEROL SULFATE 2.5 MG/3ML
2.5 SOLUTION RESPIRATORY (INHALATION) EVERY 6 HOURS PRN
Qty: 120 EACH | Refills: 0 | Status: SHIPPED | OUTPATIENT
Start: 2022-01-07

## 2022-01-07 RX ORDER — OXYCODONE HYDROCHLORIDE AND ACETAMINOPHEN 5; 325 MG/1; MG/1
1 TABLET ORAL EVERY 4 HOURS PRN
Qty: 21 TABLET | Refills: 0 | Status: SHIPPED | OUTPATIENT
Start: 2022-01-07 | End: 2022-04-08 | Stop reason: SDUPTHER

## 2022-01-07 ASSESSMENT — ENCOUNTER SYMPTOMS
SINUS PAIN: 0
BACK PAIN: 0
DIARRHEA: 0
RHINORRHEA: 0
SHORTNESS OF BREATH: 0
EYE PAIN: 0
ABDOMINAL PAIN: 0
CONSTIPATION: 0
NAUSEA: 0
COUGH: 0
VOMITING: 0

## 2022-01-07 NOTE — PROGRESS NOTES
[address] [facility] CARE  [facility] [address] [facility] [address]  [address] [address]  Dept: [phone]  Dept Fax: [phone]    Beverly Zamora is a 59 y.o. male who presents today for his medical conditions/complaints as noted below. Chief Complaint   Patient presents with    3 Month Follow-Up     stopped taking Amlodpine due to giving cramps in legs       HPI:     Patient presents the office for routine follow-up. He has past medical history including hypertension, CAD, COPD, laryngeal cancer (stable), diabetes, osteoarthritis, hyperlipidemia, obesity. Today, patient reports he is doing well with no new or acute complaints. Patient reports that he is discontinued amlodipine by himself as he was having significant cramping. He reports since discontinuing medication he has not had any cramping in his legs. He does not wish to resume this medication. He has been taking his blood pressure at home he says it is stable. Otherwise, he is tolerating current medication regimen. Patient did not obtain repeat BMP and PSA as instructed from prior labs. There was evidence of elevated creatinine and decreased GFR. He was instructed to discontinue NSAIDs and increase fluids. BP stable. Weight slightly increased from last visit. Hemoglobin A1C (%)   Date Value   2022 5.6   2021 7.1 (H)   2020 5.9             ( goal A1C is < 7)   No results found for: LABMICR  LDL Cholesterol (mg/dL)   Date Value   2021 138 (H)   2020 127   2018 126       (goal LDL is <100)   AST (U/L)   Date Value   2021 15     ALT (U/L)   Date Value   2021 19     BUN (mg/dL)   Date Value   2021 69 (H)     BP Readings from Last 3 Encounters:   22 132/84   21 131/76   21 124/72          (goal 120/80)    Past Medical History:   Diagnosis Date    Adenomatous polyp 8/15/2017    Repeat colonoscopy recommended to patient in .     Chronic headache 11/6/2014    Chronic intermittent hypoxia with obstructive sleep apnea     Chronic neck pain 11/6/2014    Colitis 5/8/2017    CT scan dated May 6, 2017 reviewed.  Colon polyp     COPD (chronic obstructive pulmonary disease) ([facility]     Full dentures     Upper & Lower    Headache 12/7/2011    See's neurology.  Heart burn     GERD    Hepatic steatosis 6/2/2017    High cholesterol 6/25/2014    Hip arthritis 11/7/2014    Hyperlipidemia     Hypertension     On Meds    Impaired fasting glucose 12/3/2015    Monitor     Laryngeal cancer [facility])     Consultant: Dr. Becker Melissa dated June 11, 2015 from Dr. Oni Ryan reviewed on 07/24/2015, see Media tab of Chart Review for more information. Correspondence dated September 23, 2015 from Dr. Patrick Barragan reviewed on 9/24/2015, see Media tab of Chart Review for more informatiion Correspondence dated October 4, 2016 from H&P from [facility] reviewed on 10/4/2016, see Media tab of Chart Review     Larynx cancer ([facility] 2007    On Rad. Tx.   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 Patrick Barragan reviewed on 9/13/16, see Media tab of Chart Review for more information.  Migraines     Mixed hyperlipidemia 11/5/2018    Neck pain 4/16/2012    Obstructive sleep apnea 3/15/2013    Clinical Polysomnography dated January 29, 2013 and February 3, 2013 from Dr. Blanca Fernandez reviewed on 03/15/2013 see Media tab of Chart Review for more information. Correspondence dated 4/30/13 from Dr. Nely Briseno reviewed on 05/02/2013, see Media tab of Chart Review for more information.  On home oxygen therapy 2013    2L/NC nightly    Osteoarthritis     rt. hip pain    Prediabetes 9/6/2019    Rectal polyp 8/11/2017    Repeat colonoscopy due on or around August 11, 2022    Reflux laryngitis 5/22/2013    Correspondence dated May 21, 2013 from Dr. Patrick Barragan reviewed on 05/22/2013, see Media tab of Chart Review for more information. Correspondence dated May 17, 2018 from Dr. Martina Perez reviewed on 18, see Media tab of Chart Review for more information.  S/P colonoscopy with polypectomy 2017    Sleep apnea     CPAP with Oxygen nightly    Vocal cord polyp 2013    Correspondence dated 4/15/13 from Dr. Martina Perez reviewed on 2013 see Media tab of Chart Review for more information. Correspondence dated 13 from Dr. Martina Perez reviewed on 2013 see Media tab of Chart Review for more information.  Wears eyeglasses       Past Surgical History:   Procedure Laterality Date    CATARACT REMOVAL Bilateral     MULTIPLE MORE ON LT    COLONOSCOPY      5 Polyps removed    COLONOSCOPY  2017    with biopsy    HIP ARTHROPLASTY Right 14    LARYNGOSCOPY  10/17/2016    with biopsies    MICROLARYNGOSCOPY W BIOPSY      NE COLSC FLX W/REMOVAL LESION BY HOT BX FORCEPS N/A 2017    COLONOSCOPY POLYPECTOMY HOT BIOPSY performed by Yassine Alvarez MD at [facility] History   Problem Relation Age of Onset    Stomach Cancer Mother     Lung Cancer Mother     Lung Cancer Brother     Esophageal Cancer Brother        Social History     Tobacco Use    Smoking status: Former Smoker     Packs/day: 2.00     Years: 45.00     Pack years: 90.00     Types: Cigarettes     Start date: 10/4/1970     Quit date: 2013     Years since quittin.6    Smokeless tobacco: Never Used    Tobacco comment: quit 3 yrs ago   Substance Use Topics    Alcohol use: No     Alcohol/week: 0.0 standard drinks      Current Outpatient Medications   Medication Sig Dispense Refill    oxyCODONE-acetaminophen (PERCOCET) 5-325 MG per tablet Take 1 tablet by mouth every 4 hours as needed for Pain for up to 7 days.  21 tablet 0    albuterol (PROVENTIL) (2.5 MG/3ML) 0.083% nebulizer solution Take 3 mLs by nebulization every 6 hours as needed for Wheezing 120 each 0    furosemide (LASIX) 40 MG tablet Take 1 tablet by mouth daily 60 tablet 3    aspirin (RA ASPIRIN EC) 81 MG EC tablet take 1 tablet by mouth once daily 90 tablet 0    pantoprazole (PROTONIX) 40 MG tablet take 1 tablet by mouth once daily 90 tablet 1    lisinopril-hydroCHLOROthiazide (PRINZIDE;ZESTORETIC) 20-12.5 MG per tablet take 2 tablets by mouth daily 180 tablet 0    atorvastatin (LIPITOR) 20 MG tablet Take 1 tablet by mouth daily 30 tablet 0    Blood Pressure Monitoring (SPHYGMOMANOMETER) MISC Use it daily as needed 1 each 0    guaiFENesin (MUCINEX) 600 MG extended release tablet take 1 tablet by mouth twice a day if needed for congestion 60 tablet 0    Handicap Placard MISC by Does not apply route Expires 2/13/2025  Diagnosis: COPD 1 each 0     No current facility-administered medications for this visit. No Known Allergies    Health Maintenance   Topic Date Due    Diabetic foot exam  Never done    Diabetic microalbuminuria test  Never done    Diabetic retinal exam  Never done    DTaP/Tdap/Td vaccine (1 - Tdap) Never done    Shingles Vaccine (1 of 2) Never done    COVID-19 Vaccine (3 - Booster for Moderna series) 10/01/2021    Depression Screen  03/19/2022    Low dose CT lung screening  09/16/2022    Lipid screen  09/30/2022    Potassium monitoring  09/30/2022    Creatinine monitoring  09/30/2022    PSA counseling  09/30/2022    A1C test (Diabetic or Prediabetic)  01/07/2023    Pneumococcal 0-64 years Vaccine (2 of 2 - PPSV23) 02/25/2023    Colon cancer screen colonoscopy  08/11/2027    Flu vaccine  Completed    Hepatitis C screen  Completed    HIV screen  Completed    Hepatitis A vaccine  Aged Out    Hib vaccine  Aged Out    Meningococcal (ACWY) vaccine  Aged Out       Subjective:      Review of Systems   Constitutional: Negative for chills, fatigue and fever. HENT: Negative for congestion, rhinorrhea and sinus pain. Eyes: Negative for pain. Respiratory: Negative for cough and shortness of breath.     Cardiovascular: Negative for chest pain and leg swelling. Gastrointestinal: Negative for abdominal pain, constipation, diarrhea, nausea and vomiting. Genitourinary: Negative for difficulty urinating, enuresis and testicular pain. Musculoskeletal: Positive for arthralgias (chronic). Negative for back pain and myalgias. Skin: Negative for rash. Neurological: Negative for dizziness and headaches. Psychiatric/Behavioral: Negative for confusion and sleep disturbance. The patient is not nervous/anxious. All other systems reviewed and are negative. Objective:     Physical Exam  Vitals and nursing note reviewed. Constitutional:       General: He is not in acute distress. Appearance: Normal appearance. He is obese. HENT:      Head: Normocephalic. Mouth/Throat:      Mouth: Mucous membranes are moist.   Eyes:      Extraocular Movements: Extraocular movements intact. Conjunctiva/sclera: Conjunctivae normal.      Pupils: Pupils are equal, round, and reactive to light. Cardiovascular:      Rate and Rhythm: Normal rate and regular rhythm. Pulses: Normal pulses. Heart sounds: Normal heart sounds. Pulmonary:      Effort: Pulmonary effort is normal. No respiratory distress. Breath sounds: Normal breath sounds. Abdominal:      General: Abdomen is flat. Bowel sounds are normal.      Palpations: Abdomen is soft. Tenderness: There is no abdominal tenderness. Musculoskeletal:      Cervical back: Normal range of motion. Right lower leg: No edema. Left lower leg: No edema. Lymphadenopathy:      Cervical: No cervical adenopathy. Skin:     General: Skin is warm. Capillary Refill: Capillary refill takes less than 2 seconds. Neurological:      General: No focal deficit present. Mental Status: He is alert and oriented to person, place, and time.    Psychiatric:         Mood and Affect: Mood normal.         Behavior: Behavior normal.       /84 (Site: Left Upper Arm, Position: Sitting, Cuff Size: Large Adult)   Pulse 88   Resp 16   Ht 5' 8\" (1.727 m)   Wt 221 lb 12.8 oz (100.6 kg)   SpO2 (!) 88%   BMI 33.72 kg/m²     Assessment:       ICD-10-CM    1. Type 2 diabetes mellitus with other specified complication, unspecified whether long term insulin use (HCC)  E11.69 POCT glycosylated hemoglobin (Hb A1C)   2. Primary hypertension  I10    3. Chronic obstructive pulmonary disease, unspecified COPD type ([facility])  J44.9 albuterol (PROVENTIL) (2.5 MG/3ML) 0.083% nebulizer solution   4. Primary osteoarthritis of both hips  M16.0 oxyCODONE-acetaminophen (PERCOCET) 5-325 MG per tablet   5. Acute right-sided low back pain without sciatica  M54.50 oxyCODONE-acetaminophen (PERCOCET) 5-325 MG per tablet   6. Laryngeal cancer ([facility] [address]  C32.9             Plan:       1. A1c improved to 5.6 from 7.1. Do not plan on adding additional medication. I stressed the importance of dietary habits and staying active. I advised on avoiding concentrated carbohydrates/sweets. 2.  Continue lisinopril hydrochlorothiazide and Lasix. 3.  Continue nebulizer as previously prescribed. Continue following with pulmonology. 4, 5. Patient stressed the importance of using Percocet sparingly for chronic joint pains. I advised Tylenol otherwise for pain he is to void all NSAID products. 6.  Laryngeal cancer is stable. Patient is going to obtain repeat lab studies as discussed earlier. Will call patient with results of kidney and PSA testing. If abnormalities persist will refer to respective specialist.    Return in about 3 months (around 4/7/2022) for medication recheck, diabetes, blood pressure. Orders Placed This Encounter   Procedures    POCT glycosylated hemoglobin (Hb A1C)         Patient given educational materials - see patient instructions. Discussed use, benefit, and side effects of prescribed medications. All patient questions answered. Pt voiced understanding. Reviewed health maintenance.   Instructed to continue current medications, diet and exercise. Patient agreed with treatment plan. Follow up as directed.         Electronically signed by Domenick Peabody, PA-C on 1/7/2022 at 12:56 PM

## 2022-01-10 DIAGNOSIS — I10 ESSENTIAL HYPERTENSION: ICD-10-CM

## 2022-01-10 RX ORDER — LISINOPRIL AND HYDROCHLOROTHIAZIDE 20; 12.5 MG/1; MG/1
2 TABLET ORAL DAILY
Qty: 180 TABLET | Refills: 0 | Status: SHIPPED | OUTPATIENT
Start: 2022-01-10 | End: 2022-01-19 | Stop reason: SDUPTHER

## 2022-01-14 ENCOUNTER — TELEPHONE (OUTPATIENT)
Dept: PRIMARY CARE CLINIC | Age: 62
End: 2022-01-14

## 2022-01-14 NOTE — TELEPHONE ENCOUNTER
Pt called stating that he has noticed recently that his BP readings have been slightly elevated. States that he has stopped taking the Amlodipine as advised by PCP and is taking the Lisinopril-HCTZ and Lasix as prescribed. Pt took BP reading while on phone with writer and verbalized the reading at 129/89. Writer advised pt to continue with medications as prescribed and to keep a log of his BP readings. Pt verbalized understanding. Please advise any other steps for the pt to take.

## 2022-01-14 NOTE — TELEPHONE ENCOUNTER
Please advise patient to continue with current regimen. If he notices trend of elevated values that I recommend he make an appointment. I know he was having cramping with relation to amlodipine. We may need to select alternate hypertensive medicine.

## 2022-01-19 DIAGNOSIS — I10 ESSENTIAL HYPERTENSION: ICD-10-CM

## 2022-01-19 RX ORDER — LISINOPRIL AND HYDROCHLOROTHIAZIDE 20; 12.5 MG/1; MG/1
2 TABLET ORAL DAILY
Qty: 180 TABLET | Refills: 0 | Status: SHIPPED | OUTPATIENT
Start: 2022-01-19 | End: 2022-04-08 | Stop reason: SDUPTHER

## 2022-01-19 NOTE — TELEPHONE ENCOUNTER
----- Message from Luisa Ortiz sent at 1/19/2022 11:25 AM EST -----  Subject: Refill Request    QUESTIONS  Name of Medication? lisinopril-hydroCHLOROthiazide (PRINZIDE;ZESTORETIC)   20-12.5 MG per tablet  Patient-reported dosage and instructions? Pt takes 2 tablet twice daily   20-12.5mg  How many days do you have left? 0  Preferred Pharmacy? [address] [place] [address] phone number (if available)? [phone]  ---------------------------------------------------------------------------  --------------  CALL BACK INFO  What is the best way for the office to contact you? OK to leave message on   voicemail, OK to respond with electronic message via [employer] portal (only   for patients who have registered [employer] account)  Preferred Call Back Phone Number?  [phone]

## 2022-03-13 DIAGNOSIS — I25.10 CORONARY ARTERY DISEASE INVOLVING NATIVE CORONARY ARTERY OF NATIVE HEART WITHOUT ANGINA PECTORIS: ICD-10-CM

## 2022-03-14 RX ORDER — ASPIRIN 81 MG/1
TABLET ORAL
Qty: 90 TABLET | Refills: 0 | Status: SHIPPED | OUTPATIENT
Start: 2022-03-14 | End: 2022-04-08 | Stop reason: SDUPTHER

## 2022-03-26 DIAGNOSIS — I10 PRIMARY HYPERTENSION: ICD-10-CM

## 2022-03-26 RX ORDER — FUROSEMIDE 20 MG/1
TABLET ORAL
Qty: 30 TABLET | Refills: 2 | Status: SHIPPED | OUTPATIENT
Start: 2022-03-26 | End: 2022-06-29

## 2022-04-01 ENCOUNTER — OFFICE VISIT (OUTPATIENT)
Dept: PULMONOLOGY | Age: 62
End: 2022-04-01
Payer: COMMERCIAL

## 2022-04-01 VITALS
OXYGEN SATURATION: 82 % | TEMPERATURE: 97.2 F | HEIGHT: 68 IN | RESPIRATION RATE: 18 BRPM | SYSTOLIC BLOOD PRESSURE: 145 MMHG | HEART RATE: 101 BPM | DIASTOLIC BLOOD PRESSURE: 83 MMHG | BODY MASS INDEX: 33.95 KG/M2 | WEIGHT: 224 LBS

## 2022-04-01 DIAGNOSIS — Z87.891 PERSONAL HISTORY OF TOBACCO USE: ICD-10-CM

## 2022-04-01 DIAGNOSIS — C32.9 LARYNGEAL CANCER (HCC): ICD-10-CM

## 2022-04-01 DIAGNOSIS — Z99.89 OSA ON CPAP: ICD-10-CM

## 2022-04-01 DIAGNOSIS — J96.11 CHRONIC HYPOXEMIC RESPIRATORY FAILURE (HCC): ICD-10-CM

## 2022-04-01 DIAGNOSIS — G47.33 OSA ON CPAP: ICD-10-CM

## 2022-04-01 DIAGNOSIS — J44.9 STAGE 4 VERY SEVERE COPD BY GOLD CLASSIFICATION (HCC): Primary | ICD-10-CM

## 2022-04-01 DIAGNOSIS — Z87.891 STOPPED SMOKING WITH GREATER THAN 40 PACK YEAR HISTORY: ICD-10-CM

## 2022-04-01 PROCEDURE — 1036F TOBACCO NON-USER: CPT | Performed by: INTERNAL MEDICINE

## 2022-04-01 PROCEDURE — G0296 VISIT TO DETERM LDCT ELIG: HCPCS | Performed by: INTERNAL MEDICINE

## 2022-04-01 PROCEDURE — 3023F SPIROM DOC REV: CPT | Performed by: INTERNAL MEDICINE

## 2022-04-01 PROCEDURE — 3017F COLORECTAL CA SCREEN DOC REV: CPT | Performed by: INTERNAL MEDICINE

## 2022-04-01 PROCEDURE — 99213 OFFICE O/P EST LOW 20 MIN: CPT | Performed by: INTERNAL MEDICINE

## 2022-04-01 PROCEDURE — G8427 DOCREV CUR MEDS BY ELIG CLIN: HCPCS | Performed by: INTERNAL MEDICINE

## 2022-04-01 PROCEDURE — G8417 CALC BMI ABV UP PARAM F/U: HCPCS | Performed by: INTERNAL MEDICINE

## 2022-04-01 ASSESSMENT — ENCOUNTER SYMPTOMS
SHORTNESS OF BREATH: 1
EYES NEGATIVE: 1
WHEEZING: 1
CHEST TIGHTNESS: 1
COUGH: 1
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
## 2022-04-01 NOTE — PATIENT INSTRUCTIONS
Printed AVS for patient   mariana    What is lung cancer screening? Lung cancer screening is a way in which doctors check the lungs for early signs of cancer in people who have no symptoms of lung cancer. A low-dose CT scan uses much less radiation than a normal CT scan and shows a more detailed image of the lungs than a standard X-ray. The goal of lung cancer screening is to find cancer early, before it has a chance to grow, spread, or cause problems. One large study found that smokers who were screened with low-dose CT scans were less likely to die of lung cancer than those who were screened with standard X-ray. Below is a summary of the things you need to know regarding screening for lung cancer with low-dose computed tomography (LDCT). This is a screening program that involves routine annual screening with LDCT studies of the lung. The LDCTs are done using low-dose radiation that is not thought to increase your cancer risk. If you have other serious medical conditions (other cancers, congestive heart failure) that limit your life expectancy to less than 10 years, you should not undergo lung cancer screening with LDCT. The chance is 20%-60% that the LDCT result will show abnormalities. This would require additional testing which could include repeat imaging or even invasive procedures. Most (about 95%) of \"abnormal\" LDCT results are false in the sense that no lung cancer is ultimately found. Additionally, some (about 10%) of the cancers found would not affect your life expectancy, even if undetected and untreated. If you are still smoking, the single most important thing that you can do to reduce your risk of dying of lung cancer is to quit. For this screening to be covered by Medicare and most other insurers, strict criteria must be met.   If you do not meet these criteria, but still wish to undergo LDCT testing, you will be required to sign a waiver indicating your willingness to pay for the scan.

## 2022-04-08 ENCOUNTER — OFFICE VISIT (OUTPATIENT)
Dept: PRIMARY CARE CLINIC | Age: 62
End: 2022-04-08
Payer: COMMERCIAL

## 2022-04-08 VITALS
SYSTOLIC BLOOD PRESSURE: 124 MMHG | HEIGHT: 68 IN | DIASTOLIC BLOOD PRESSURE: 84 MMHG | HEART RATE: 110 BPM | OXYGEN SATURATION: 88 % | WEIGHT: 222.6 LBS | RESPIRATION RATE: 16 BRPM | BODY MASS INDEX: 33.74 KG/M2

## 2022-04-08 DIAGNOSIS — E11.69 TYPE 2 DIABETES MELLITUS WITH OTHER SPECIFIED COMPLICATION, UNSPECIFIED WHETHER LONG TERM INSULIN USE (HCC): ICD-10-CM

## 2022-04-08 DIAGNOSIS — I10 ESSENTIAL HYPERTENSION: Primary | ICD-10-CM

## 2022-04-08 DIAGNOSIS — M16.0 PRIMARY OSTEOARTHRITIS OF BOTH HIPS: ICD-10-CM

## 2022-04-08 DIAGNOSIS — I25.10 CORONARY ARTERY DISEASE INVOLVING NATIVE CORONARY ARTERY OF NATIVE HEART WITHOUT ANGINA PECTORIS: ICD-10-CM

## 2022-04-08 DIAGNOSIS — M54.50 ACUTE RIGHT-SIDED LOW BACK PAIN WITHOUT SCIATICA: ICD-10-CM

## 2022-04-08 PROCEDURE — G8427 DOCREV CUR MEDS BY ELIG CLIN: HCPCS | Performed by: PHYSICIAN ASSISTANT

## 2022-04-08 PROCEDURE — G8417 CALC BMI ABV UP PARAM F/U: HCPCS | Performed by: PHYSICIAN ASSISTANT

## 2022-04-08 PROCEDURE — 3017F COLORECTAL CA SCREEN DOC REV: CPT | Performed by: PHYSICIAN ASSISTANT

## 2022-04-08 PROCEDURE — 99214 OFFICE O/P EST MOD 30 MIN: CPT | Performed by: PHYSICIAN ASSISTANT

## 2022-04-08 PROCEDURE — 2022F DILAT RTA XM EVC RTNOPTHY: CPT | Performed by: PHYSICIAN ASSISTANT

## 2022-04-08 PROCEDURE — 1036F TOBACCO NON-USER: CPT | Performed by: PHYSICIAN ASSISTANT

## 2022-04-08 PROCEDURE — 3044F HG A1C LEVEL LT 7.0%: CPT | Performed by: PHYSICIAN ASSISTANT

## 2022-04-08 RX ORDER — ASPIRIN 81 MG/1
TABLET ORAL
Qty: 90 TABLET | Refills: 0 | Status: SHIPPED | OUTPATIENT
Start: 2022-04-08 | End: 2022-06-29

## 2022-04-08 RX ORDER — LISINOPRIL AND HYDROCHLOROTHIAZIDE 20; 12.5 MG/1; MG/1
2 TABLET ORAL DAILY
Qty: 180 TABLET | Refills: 0 | Status: SHIPPED | OUTPATIENT
Start: 2022-04-08 | End: 2022-09-27

## 2022-04-08 RX ORDER — OXYCODONE HYDROCHLORIDE AND ACETAMINOPHEN 5; 325 MG/1; MG/1
1 TABLET ORAL EVERY 4 HOURS PRN
Qty: 21 TABLET | Refills: 0 | Status: SHIPPED | OUTPATIENT
Start: 2022-04-08 | End: 2022-07-20 | Stop reason: SDUPTHER

## 2022-04-08 ASSESSMENT — ENCOUNTER SYMPTOMS
CONSTIPATION: 0
ABDOMINAL PAIN: 0
DIARRHEA: 0
SINUS PAIN: 0
EYE PAIN: 0
NAUSEA: 0
VOMITING: 0
RHINORRHEA: 0

## 2022-04-08 NOTE — PROGRESS NOTES
[facility] PR[facility]  [facility] [facility] [place] [address]  [address] [address]  Dept: [phone]  Dept Fax: [phone]    Maribeth Peralta is a 57 y.o. male who presents today for his medical conditions/complaints as noted below. Chief Complaint   Patient presents with    Diabetes       HPI:     Patient presents to the office for routine follow-up. He has past medical history including hypertension, CAD, type 2 diabetes, hyperlipidemia, COPD, laryngeal cancer, sleep apnea, osteoarthritis. Today, patient reports that he is doing well. He is tolerating current medication regimen without any side effects. Denies any new or acute complaints. He continues following with pulmonology for management of COPD and sleep apnea. He is compliant with oxygen and CPAP device. Denies any exacerbation at this time. Patient reports that he has been monitoring his diet to prevent worsening diabetes. Continues to use Percocet on an as-needed basis for chronic pain. OARRS reviewed. BP stable. Weight stable. Hemoglobin A1C (%)   Date Value   2022 5.6   2021 7.1 (H)   2020 5.9             ( goal A1C is < 7)   No results found for: LABMICR  LDL Cholesterol (mg/dL)   Date Value   2021 138 (H)   2020 127   2018 126       (goal LDL is <100)   AST (U/L)   Date Value   2021 15     ALT (U/L)   Date Value   2021 19     BUN (mg/dL)   Date Value   2022 57 (H)     BP Readings from Last 3 Encounters:   22 124/84   22 (!) 145/83   22 132/84          (goal 120/80)    Past Medical History:   Diagnosis Date    Adenomatous polyp 8/15/2017    Repeat colonoscopy recommended to patient in .  Chronic headache 2014    Chronic intermittent hypoxia with obstructive sleep apnea     Chronic neck pain 2014    Colitis 2017    CT scan dated May 6, 2017 reviewed.     Colon polyp     COPD (chronic obstructive pulmonary disease) ([facility] [address] [facility]     Full dentures     Upper & Lower    Headache 12/7/2011    See's neurology.  Heart burn     GERD    Hepatic steatosis 6/2/2017    High cholesterol 6/25/2014    Hip arthritis 11/7/2014    Hyperlipidemia     Hypertension     On Meds    Impaired fasting glucose 12/3/2015    Monitor     Laryngeal cancer [facility])     Consultant: Dr. Max Scott dated June 11, 2015 from Dr. Concepcion Regan reviewed on 07/24/2015, see Media tab of Chart Review for more information. Correspondence dated September 23, 2015 from Dr. Courtney Rasmussen reviewed on 9/24/2015, see Media tab of Chart Review for more informatiion Correspondence dated October 4, 2016 from [facility] from [facility] reviewed on 10/4/2016, see Media tab of Chart Review     [facility] 2007    On Rad. Tx.   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 Courtney Rasmussen reviewed on 9/13/16, see Media tab of Chart Review for more information.  Migraines     Mixed hyperlipidemia 11/5/2018    Neck pain 4/16/2012    Obstructive sleep apnea 3/15/2013    Clinical Polysomnography dated January 29, 2013 and February 3, 2013 from Dr. Richmond Recinos reviewed on 03/15/2013 see Media tab of Chart Review for more information. Correspondence dated 4/30/13 from Dr. Alona Willett reviewed on 05/02/2013, see Media tab of Chart Review for more information.  On home oxygen therapy 2013    2L/NC nightly    Osteoarthritis     rt. hip pain    Prediabetes 9/6/2019    Rectal polyp 8/11/2017    Repeat colonoscopy due on or around August 11, 2022    Reflux laryngitis 5/22/2013    Correspondence dated May 21, 2013 from Dr. Courtney Rasmussen reviewed on 05/22/2013, see Media tab of Chart Review for more information. Correspondence dated May 17, 2018 from Dr. Courtney Rasmussen reviewed on 5/17/18, see Media tab of Chart Review for more information.      S/P colonoscopy with polypectomy 8/11/2017    Sleep apnea 2013    CPAP with Oxygen nightly    Vocal cord polyp 2013    Correspondence dated 4/15/13 from Dr. Nathanael Corea reviewed on 2013 see Media tab of Chart Review for more information. Correspondence dated 13 from Dr. Nathanael Corea reviewed on 2013 see Media tab of Chart Review for more information.  Wears eyeglasses       Past Surgical History:   Procedure Laterality Date    CATARACT REMOVAL Bilateral     MULTIPLE MORE ON LT    COLONOSCOPY      5 Polyps removed    COLONOSCOPY  2017    with biopsy    HIP ARTHROPLASTY Right 14    LARYNGOSCOPY  10/17/2016    with biopsies    MICROLARYNGOSCOPY W BIOPSY      OK COLSC FLX W/REMOVAL LESION BY HOT BX FORCEPS N/A 2017    COLONOSCOPY POLYPECTOMY HOT BIOPSY performed by Cheryl Bloom MD at [address] [place] [address] History   Problem Relation Age of Onset    Stomach Cancer Mother     Lung Cancer Mother     Lung Cancer Brother     Esophageal Cancer Brother        Social History     Tobacco Use    Smoking status: Former Smoker     Packs/day: 2.00     Years: 45.00     Pack years: 90.00     Types: Cigarettes     Start date: 10/4/1970     Quit date: 2013     Years since quittin.8    Smokeless tobacco: Never Used    Tobacco comment: quit 3 yrs ago   Substance Use Topics    Alcohol use: No     Alcohol/week: 0.0 standard drinks      Current Outpatient Medications   Medication Sig Dispense Refill    lisinopril-hydroCHLOROthiazide (PRINZIDE;ZESTORETIC) 20-12.5 MG per tablet Take 2 tablets by mouth daily 180 tablet 0    oxyCODONE-acetaminophen (PERCOCET) 5-325 MG per tablet Take 1 tablet by mouth every 4 hours as needed for Pain for up to 7 days.  21 tablet 0    aspirin (RA ASPIRIN EC) 81 MG EC tablet take 1 tablet by mouth once daily 90 tablet 0    furosemide (LASIX) 20 MG tablet take 1 tablet by mouth once daily 30 tablet 2    albuterol (PROVENTIL) (2.5 MG/3ML) 0.083% nebulizer solution Take 3 mLs by nebulization every 6 hours as needed for Wheezing 120 each 0    pantoprazole (PROTONIX) 40 MG tablet take 1 tablet by mouth once daily 90 tablet 1    atorvastatin (LIPITOR) 20 MG tablet Take 1 tablet by mouth daily 30 tablet 0    Blood Pressure Monitoring (SPHYGMOMANOMETER) MISC Use it daily as needed 1 each 0    guaiFENesin (MUCINEX) 600 MG extended release tablet take 1 tablet by mouth twice a day if needed for congestion 60 tablet 0    Handicap Placard MISC by Does not apply route Expires 2/13/2025  Diagnosis: COPD 1 each 0     No current facility-administered medications for this visit. No Known Allergies    Health Maintenance   Topic Date Due    Diabetic retinal exam  Never done    DTaP/Tdap/Td vaccine (1 - Tdap) Never done    Shingles Vaccine (1 of 2) Never done    COVID-19 Vaccine (3 - Booster for Moderna series) 09/01/2021    Depression Screen  03/19/2022    Diabetic foot exam  04/08/2023 (Originally 2/25/1970)    Diabetic microalbuminuria test  04/08/2023 (Originally 2/25/1978)    Low dose CT lung screening  09/16/2022    Lipid screen  09/30/2022    A1C test (Diabetic or Prediabetic)  01/07/2023    Potassium monitoring  01/07/2023    Creatinine monitoring  01/07/2023    PSA counseling  01/07/2023    Pneumococcal 0-64 years Vaccine (2 of 2 - PPSV23) 02/25/2023    Colorectal Cancer Screen  08/11/2027    Flu vaccine  Completed    Hepatitis C screen  Completed    HIV screen  Completed    Hepatitis A vaccine  Aged Out    Hib vaccine  Aged Out    Meningococcal (ACWY) vaccine  Aged Out       Subjective:      Review of Systems   Constitutional: Negative for chills, fatigue and fever. HENT: Negative for congestion, rhinorrhea and sinus pain. Eyes: Negative for pain. Respiratory:        + COPD   Cardiovascular: Negative for chest pain and leg swelling. Gastrointestinal: Negative for abdominal pain, constipation, diarrhea, nausea and vomiting. Genitourinary: Negative for difficulty urinating, enuresis and testicular pain.    Musculoskeletal: Positive for arthralgias. Negative for myalgias. Skin: Negative for rash. Neurological: Negative for dizziness and headaches. Psychiatric/Behavioral: Negative for confusion and sleep disturbance. The patient is not nervous/anxious. All other systems reviewed and are negative. Objective:     Physical Exam  Vitals and nursing note reviewed. Constitutional:       General: He is not in acute distress. Appearance: Normal appearance. He is obese. HENT:      Head: Normocephalic. Mouth/Throat:      Mouth: Mucous membranes are moist.   Eyes:      Extraocular Movements: Extraocular movements intact. Conjunctiva/sclera: Conjunctivae normal.      Pupils: Pupils are equal, round, and reactive to light. Cardiovascular:      Rate and Rhythm: Normal rate and regular rhythm. Pulses: Normal pulses. Heart sounds: Normal heart sounds. No murmur heard. Pulmonary:      Effort: Pulmonary effort is normal. No respiratory distress. Breath sounds: Normal breath sounds. Abdominal:      General: Abdomen is flat. Bowel sounds are normal.      Palpations: Abdomen is soft. Tenderness: There is no abdominal tenderness. Musculoskeletal:      Cervical back: Normal range of motion. Right lower leg: No edema. Left lower leg: No edema. Lymphadenopathy:      Cervical: No cervical adenopathy. Skin:     General: Skin is warm. Capillary Refill: Capillary refill takes less than 2 seconds. Neurological:      General: No focal deficit present. Mental Status: He is alert and oriented to person, place, and time. Psychiatric:         Mood and Affect: Mood normal.         Behavior: Behavior normal.       /84 (Site: Left Upper Arm, Position: Sitting, Cuff Size: Large Adult)   Pulse 110   Resp 16   Ht 5' 8\" (1.727 m)   Wt 222 lb 9.6 oz (101 kg)   SpO2 (!) 88%   BMI 33.85 kg/m²     Assessment:       ICD-10-CM    1.  Essential hypertension  I10 lisinopril-hydroCHLOROthiazide (PRINZIDE;ZESTORETIC) 20-12.5 MG per tablet   2. Primary osteoarthritis of both hips  M16.0 oxyCODONE-acetaminophen (PERCOCET) 5-325 MG per tablet   3. Acute right-sided low back pain without sciatica  M54.50 oxyCODONE-acetaminophen (PERCOCET) 5-325 MG per tablet   4. Coronary artery disease involving native coronary artery of native heart without angina pectoris  I25.10 aspirin (RA ASPIRIN EC) 81 MG EC tablet   5. Type 2 diabetes mellitus with other specified complication, unspecified whether long term insulin use (HCC)  E11.69             Plan:       1. Hypertension is stable. Continue current lisinopril-hydrochlorothiazide daily. 2,3. Patient given refill of Percocet to be used sparingly for chronic pain. Reports pain is fairly stable and he does not use medication every day. 4.  Patient to continue aspirin and cardiac medications. 5.  A1c at last visit was stable. We will recheck at next visit. He is to continue managing diabetes with dietary measures. Return in about 3 months (around 7/8/2022) for medication recheck, diabetes, blood pressure. No orders of the defined types were placed in this encounter. Patient given educational materials - see patient instructions. Discussed use, benefit, and side effects of prescribed medications. All patient questions answered. Pt voiced understanding. Reviewed health maintenance. Instructed to continue current medications, diet and exercise. Patient agreed with treatment plan. Follow up as directed.         Electronically signed by eBre Rider PA-C on 4/8/2022 at 2:05 PM

## 2022-05-25 DIAGNOSIS — J04.0 REFLUX LARYNGITIS: ICD-10-CM

## 2022-05-25 DIAGNOSIS — K21.9 REFLUX LARYNGITIS: ICD-10-CM

## 2022-05-25 RX ORDER — PANTOPRAZOLE SODIUM 40 MG/1
TABLET, DELAYED RELEASE ORAL
Qty: 90 TABLET | Refills: 1 | Status: SHIPPED | OUTPATIENT
Start: 2022-05-25

## 2022-06-20 ENCOUNTER — TELEPHONE (OUTPATIENT)
Dept: PRIMARY CARE CLINIC | Age: 62
End: 2022-06-20

## 2022-06-20 NOTE — TELEPHONE ENCOUNTER
The patient called stating that he woke up today with left foot pain since morning, went to bed with no problems. The patient states that he can't bear weight on foot, has used percocet that he normally takes. The patient is asking for an Xray. Writer advised the patient to schedule an appointment or be seen at the walk in clinic, the patient asked for a message to be sent to his PCP first.     Please advise.

## 2022-06-20 NOTE — TELEPHONE ENCOUNTER
Call made to pt to inform him to be seen by Urgent Care/Walk In Clinic, spoke with pt daughter and she states the patient is currently filling paperwork out to be seen by urgent care.

## 2022-06-20 NOTE — TELEPHONE ENCOUNTER
Is there any injury or trauma? Where specifically is the pain? I would recommend evaluation as you advised as well.     Dolly Rico

## 2022-06-23 ENCOUNTER — TELEPHONE (OUTPATIENT)
Dept: PULMONOLOGY | Age: 62
End: 2022-06-23

## 2022-06-23 DIAGNOSIS — J44.9 STAGE 4 VERY SEVERE COPD BY GOLD CLASSIFICATION (HCC): Primary | ICD-10-CM

## 2022-06-23 NOTE — TELEPHONE ENCOUNTER
[facility] medical [facility] an updated script for patients oxygen.

## 2022-06-29 DIAGNOSIS — I25.10 CORONARY ARTERY DISEASE INVOLVING NATIVE CORONARY ARTERY OF NATIVE HEART WITHOUT ANGINA PECTORIS: ICD-10-CM

## 2022-06-29 DIAGNOSIS — I10 PRIMARY HYPERTENSION: ICD-10-CM

## 2022-06-29 RX ORDER — FUROSEMIDE 20 MG/1
TABLET ORAL
Qty: 30 TABLET | Refills: 2 | Status: SHIPPED | OUTPATIENT
Start: 2022-06-29 | End: 2022-09-29

## 2022-06-29 RX ORDER — ASPIRIN 81 MG/1
TABLET ORAL
Qty: 90 TABLET | Refills: 0 | Status: SHIPPED | OUTPATIENT
Start: 2022-06-29 | End: 2022-09-29

## 2022-07-19 ENCOUNTER — OFFICE VISIT (OUTPATIENT)
Dept: PRIMARY CARE CLINIC | Age: 62
End: 2022-07-19
Payer: COMMERCIAL

## 2022-07-19 VITALS
SYSTOLIC BLOOD PRESSURE: 130 MMHG | HEIGHT: 68 IN | BODY MASS INDEX: 34.95 KG/M2 | HEART RATE: 123 BPM | RESPIRATION RATE: 16 BRPM | WEIGHT: 230.6 LBS | OXYGEN SATURATION: 73 % | DIASTOLIC BLOOD PRESSURE: 82 MMHG

## 2022-07-19 DIAGNOSIS — I10 PRIMARY HYPERTENSION: ICD-10-CM

## 2022-07-19 DIAGNOSIS — M16.0 PRIMARY OSTEOARTHRITIS OF BOTH HIPS: ICD-10-CM

## 2022-07-19 DIAGNOSIS — M54.50 ACUTE RIGHT-SIDED LOW BACK PAIN WITHOUT SCIATICA: ICD-10-CM

## 2022-07-19 DIAGNOSIS — J44.9 CHRONIC OBSTRUCTIVE PULMONARY DISEASE, UNSPECIFIED COPD TYPE (HCC): Primary | ICD-10-CM

## 2022-07-19 DIAGNOSIS — E11.69 TYPE 2 DIABETES MELLITUS WITH OTHER SPECIFIED COMPLICATION, UNSPECIFIED WHETHER LONG TERM INSULIN USE (HCC): ICD-10-CM

## 2022-07-19 LAB — HBA1C MFR BLD: 6.2 %

## 2022-07-19 PROCEDURE — 83036 HEMOGLOBIN GLYCOSYLATED A1C: CPT | Performed by: PHYSICIAN ASSISTANT

## 2022-07-19 PROCEDURE — 3017F COLORECTAL CA SCREEN DOC REV: CPT | Performed by: PHYSICIAN ASSISTANT

## 2022-07-19 PROCEDURE — G8427 DOCREV CUR MEDS BY ELIG CLIN: HCPCS | Performed by: PHYSICIAN ASSISTANT

## 2022-07-19 PROCEDURE — G8417 CALC BMI ABV UP PARAM F/U: HCPCS | Performed by: PHYSICIAN ASSISTANT

## 2022-07-19 PROCEDURE — 3044F HG A1C LEVEL LT 7.0%: CPT | Performed by: PHYSICIAN ASSISTANT

## 2022-07-19 PROCEDURE — 3023F SPIROM DOC REV: CPT | Performed by: PHYSICIAN ASSISTANT

## 2022-07-19 PROCEDURE — 2022F DILAT RTA XM EVC RTNOPTHY: CPT | Performed by: PHYSICIAN ASSISTANT

## 2022-07-19 PROCEDURE — 1036F TOBACCO NON-USER: CPT | Performed by: PHYSICIAN ASSISTANT

## 2022-07-19 PROCEDURE — 99214 OFFICE O/P EST MOD 30 MIN: CPT | Performed by: PHYSICIAN ASSISTANT

## 2022-07-19 SDOH — ECONOMIC STABILITY: TRANSPORTATION INSECURITY
IN THE PAST 12 MONTHS, HAS LACK OF TRANSPORTATION KEPT YOU FROM MEETINGS, WORK, OR FROM GETTING THINGS NEEDED FOR DAILY LIVING?: YES

## 2022-07-19 SDOH — ECONOMIC STABILITY: FOOD INSECURITY: WITHIN THE PAST 12 MONTHS, THE FOOD YOU BOUGHT JUST DIDN'T LAST AND YOU DIDN'T HAVE MONEY TO GET MORE.: NEVER TRUE

## 2022-07-19 SDOH — ECONOMIC STABILITY: FOOD INSECURITY: WITHIN THE PAST 12 MONTHS, YOU WORRIED THAT YOUR FOOD WOULD RUN OUT BEFORE YOU GOT MONEY TO BUY MORE.: NEVER TRUE

## 2022-07-19 SDOH — ECONOMIC STABILITY: TRANSPORTATION INSECURITY
IN THE PAST 12 MONTHS, HAS THE LACK OF TRANSPORTATION KEPT YOU FROM MEDICAL APPOINTMENTS OR FROM GETTING MEDICATIONS?: YES

## 2022-07-19 ASSESSMENT — PATIENT HEALTH QUESTIONNAIRE - PHQ9
SUM OF ALL RESPONSES TO PHQ QUESTIONS 1-9: 0
SUM OF ALL RESPONSES TO PHQ QUESTIONS 1-9: 0
1. LITTLE INTEREST OR PLEASURE IN DOING THINGS: 0
SUM OF ALL RESPONSES TO PHQ QUESTIONS 1-9: 0
SUM OF ALL RESPONSES TO PHQ QUESTIONS 1-9: 0
SUM OF ALL RESPONSES TO PHQ9 QUESTIONS 1 & 2: 0
2. FEELING DOWN, DEPRESSED OR HOPELESS: 0

## 2022-07-19 ASSESSMENT — SOCIAL DETERMINANTS OF HEALTH (SDOH): HOW HARD IS IT FOR YOU TO PAY FOR THE VERY BASICS LIKE FOOD, HOUSING, MEDICAL CARE, AND HEATING?: SOMEWHAT HARD

## 2022-07-19 NOTE — PROGRESS NOTES
[address] PRIMARY CARE  [address]  [address] [address]  Dept: [phone]  Dept Fax: [phone]    Trace Whitney is a 57 y.o. male who presents today for his medical conditions/complaints as noted below. Chief Complaint   Patient presents with    Diabetes    Foot Pain     Was seen at [address] [facility], had XR with no issues    Hypertension     Noticing BP fluctuating recently, gets headaches and takes reading that is elevated       HPI:     Patient presents to the office for routine evaluation. He has past medical history of COPD, CAD, hypertension, laryngeal cancer, SHAMAR, diabetes, osteoarthritis. Today, patient reports that he feels his COPD is getting worse. His O2 level in office is 73%. He attributes this to not having portable oxygen any longer. He admits to intermittent SOB with activity and cough. He does not see pulmonologist for a few months. He also has concerns for up and down blood pressure. He says at times he will go above 150/90. He notices some headaches when BP jumps. No chest pain. He reports that he is not compliant with medication management. He is not taking stating therapy. He is not wanting to take metformin due to side effects. Of note, patient went to local urgent care for acute foot pain. He states he had extreme difficulty with walking. Urgent care examined left foot with x-rays and sent him home. He reports the foot pain is completely resolved.        Hemoglobin A1C (%)   Date Value   07/19/2022 6.2   01/07/2022 5.6   09/30/2021 7.1 (H)             ( goal A1C is < 7)   No results found for: LABMICR  LDL Cholesterol (mg/dL)   Date Value   09/30/2021 138 (H)   09/04/2020 127   11/06/2018 126       (goal LDL is <100)   AST (U/L)   Date Value   09/30/2021 15     ALT (U/L)   Date Value   09/30/2021 19     BUN (mg/dL)   Date Value   01/07/2022 57 (H)     BP Readings from Last 3 Encounters:   07/19/22 130/82 04/08/22 124/84   04/01/22 (!) 145/83          (goal 120/80)    Past Medical History:   Diagnosis Date    Adenomatous polyp 8/15/2017    Repeat colonoscopy recommended to patient in 2020. Chronic headache 11/6/2014    Chronic intermittent hypoxia with obstructive sleep apnea     Chronic neck pain 11/6/2014    Colitis 5/8/2017    CT scan dated May 6, 2017 reviewed. Colon polyp     COPD (chronic obstructive pulmonary disease) ([address]     Full dentures     Upper & Lower    Headache 12/7/2011    See's neurology. Heart burn     GERD    Hepatic steatosis 6/2/2017    High cholesterol 6/25/2014    Hip arthritis 11/7/2014    Hyperlipidemia     Hypertension     On Meds    Impaired fasting glucose 12/3/2015    Monitor     Laryngeal cancer [facility])     Consultant: Dr. Su Left dated June 11, 2015 from Dr. Jm Carrizales reviewed on 07/24/2015, see Media tab of Chart Review for more information. Correspondence dated September 23, 2015 from Dr. Gil [place] reviewed on 9/24/2015, see Media tab of Chart Review for more informatiion Correspondence dated October 4, 2016 from H&P from [facility] reviewed on 10/4/2016, see Media tab of Chart Review     Larynx cancer ([facility] 2007    On Rad. Tx.    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 Gil [place] reviewed on 9/13/16, see Media tab of Chart Review for more information. Migraines     Mixed hyperlipidemia 11/5/2018    Neck pain 4/16/2012    Obstructive sleep apnea 3/15/2013    Clinical Polysomnography dated January 29, 2013 and February 3, 2013 from Dr. Liz Parr reviewed on 03/15/2013 see Media tab of Chart Review for more information. Correspondence dated 4/30/13 from Dr. Khadijah Garg reviewed on 05/02/2013, see Media tab of Chart Review for more information.      On home oxygen therapy 2013    2L/NC nightly    Osteoarthritis     rt. hip pain    Prediabetes 9/6/2019    Rectal polyp 8/11/2017    Repeat colonoscopy due on or around August 11,     Reflux laryngitis 2013    Correspondence dated May 21, 2013 from Dr. Chin Schulz reviewed on 2013, see Media tab of Chart Review for more information. Correspondence dated May 17, 2018 from Dr. Chin Schulz reviewed on 18, see Media tab of Chart Review for more information. S/P colonoscopy with polypectomy 2017    Sleep apnea     CPAP with Oxygen nightly    Vocal cord polyp 2013    Correspondence dated 4/15/13 from Dr. Chin Schulz reviewed on 2013 see Media tab of Chart Review for more information. Correspondence dated 13 from Dr. Chin Schulz reviewed on 2013 see Media tab of Chart Review for more information. Wears eyeglasses       Past Surgical History:   Procedure Laterality Date    CATARACT REMOVAL Bilateral     MULTIPLE MORE ON LT    COLONOSCOPY      5 Polyps removed    COLONOSCOPY  2017    with biopsy    HIP ARTHROPLASTY Right 14    LARYNGOSCOPY  10/17/2016    with biopsies    MICROLARYNGOSCOPY W BIOPSY      [place] COLSC FLX W/REMOVAL LESION BY HOT BX FORCEPS N/A 2017    COLONOSCOPY POLYPECTOMY HOT BIOPSY performed by Gema Aguero MD at [address] [place] [address]       Family History   Problem Relation Age of Onset    Stomach Cancer Mother     Lung Cancer Mother     Lung Cancer Brother     Esophageal Cancer Brother        Social History     Tobacco Use    Smoking status: Former     Packs/day: 2.00     Years: 45.00     Pack years: 90.00     Types: Cigarettes     Start date: 10/4/1970     Quit date: 2013     Years since quittin.1    Smokeless tobacco: Never    Tobacco comments:     quit 3 yrs ago   Substance Use Topics    Alcohol use: No     Alcohol/week: 0.0 standard drinks      Current Outpatient Medications   Medication Sig Dispense Refill    oxyCODONE-acetaminophen (PERCOCET) 5-325 MG per tablet Take 1 tablet by mouth every 4 hours as needed for Pain for up to 30 days.  30 tablet 0    furosemide (LASIX) 20 MG tablet take 1 tablet by mouth once daily 30 tablet 2    aspirin (RA ASPIRIN EC) 81 MG EC tablet take 1 tablet by mouth once daily 90 tablet 0    pantoprazole (PROTONIX) 40 MG tablet take 1 tablet by mouth once daily 90 tablet 1    lisinopril-hydroCHLOROthiazide (PRINZIDE;ZESTORETIC) 20-12.5 MG per tablet Take 2 tablets by mouth daily 180 tablet 0    albuterol (PROVENTIL) (2.5 MG/3ML) 0.083% nebulizer solution Take 3 mLs by nebulization every 6 hours as needed for Wheezing 120 each 0    atorvastatin (LIPITOR) 20 MG tablet Take 1 tablet by mouth daily 30 tablet 0    Blood Pressure Monitoring (SPHYGMOMANOMETER) MISC Use it daily as needed 1 each 0    guaiFENesin (MUCINEX) 600 MG extended release tablet take 1 tablet by mouth twice a day if needed for congestion 60 tablet 0    Handicap Placard MISC by Does not apply route Expires 2/13/2025  Diagnosis: COPD 1 each 0     No current facility-administered medications for this visit. No Known Allergies    Health Maintenance   Topic Date Due    Diabetic retinal exam  Never done    DTaP/Tdap/Td vaccine (1 - Tdap) Never done    Shingles vaccine (1 of 2) Never done    COVID-19 Vaccine (3 - Booster for Moderna series) 09/01/2021    Diabetic foot exam  04/08/2023 (Originally 2/25/1970)    Diabetic microalbuminuria test  04/08/2023 (Originally 2/25/1978)    Flu vaccine (1) 09/01/2022    Low dose CT lung screening  09/16/2022    Lipids  09/30/2022    Prostate Specific Antigen (PSA) Screening or Monitoring  01/07/2023    Pneumococcal 0-64 years Vaccine (3 - PPSV23 or PCV20) 02/25/2023    A1C test (Diabetic or Prediabetic)  07/19/2023    Depression Screen  07/19/2023    Colorectal Cancer Screen  08/11/2027    Hepatitis C screen  Completed    HIV screen  Completed    Hepatitis A vaccine  Aged Out    Hib vaccine  Aged Out    Meningococcal (ACWY) vaccine  Aged Out       Subjective:      Review of Systems   Constitutional:  Positive for fatigue. Negative for chills and fever.    HENT:  Negative for congestion, rhinorrhea and sinus pain. Eyes:  Negative for pain. Respiratory:  Positive for cough and shortness of breath.         + COPD   Cardiovascular:  Negative for chest pain and leg swelling. Gastrointestinal:  Negative for abdominal pain, constipation, diarrhea, nausea and vomiting. Genitourinary:  Negative for difficulty urinating, enuresis and testicular pain. Musculoskeletal:  Positive for arthralgias and back pain. Negative for myalgias. Skin:  Negative for rash. Neurological:  Negative for dizziness and headaches. Psychiatric/Behavioral:  Negative for confusion and sleep disturbance. The patient is not nervous/anxious. All other systems reviewed and are negative. Objective:     Physical Exam  Vitals and nursing note reviewed. Constitutional:       General: He is not in acute distress. Appearance: Normal appearance. HENT:      Head: Normocephalic. Right Ear: External ear normal.      Left Ear: External ear normal.      Mouth/Throat:      Mouth: Mucous membranes are moist.   Eyes:      Extraocular Movements: Extraocular movements intact. Conjunctiva/sclera: Conjunctivae normal.      Pupils: Pupils are equal, round, and reactive to light. Cardiovascular:      Rate and Rhythm: Normal rate and regular rhythm. Pulses: Normal pulses. Heart sounds: Normal heart sounds. Pulmonary:      Effort: Pulmonary effort is normal.      Breath sounds: Normal breath sounds. Abdominal:      General: Abdomen is flat. Bowel sounds are normal.      Palpations: Abdomen is soft. Tenderness: There is no abdominal tenderness. Musculoskeletal:      Cervical back: Normal range of motion. Right lower leg: No edema. Left lower leg: No edema. Lymphadenopathy:      Cervical: No cervical adenopathy. Skin:     General: Skin is warm. Capillary Refill: Capillary refill takes less than 2 seconds. Neurological:      General: No focal deficit present.       Mental Status: He is alert and oriented to person, place, and time. Psychiatric:         Mood and Affect: Mood normal.         Behavior: Behavior normal.     /82 (Site: Left Upper Arm, Position: Sitting, Cuff Size: Medium Adult)   Pulse (!) 123   Resp 16   Ht 5' 8\" (1.727 m)   Wt 230 lb 9.6 oz (104.6 kg)   SpO2 (!) 73%   BMI 35.06 kg/m²     Assessment:       ICD-10-CM    1. Chronic obstructive pulmonary disease, unspecified COPD type ([facility]  J44.9       2. Primary hypertension  I10       3. Type 2 diabetes mellitus with other specified complication, unspecified whether long term insulin use (HCC)  E11.69 POCT glycosylated hemoglobin (Hb A1C)      4. Primary osteoarthritis of both hips  M16.0 oxyCODONE-acetaminophen (PERCOCET) 5-325 MG per tablet      5. Acute right-sided low back pain without sciatica  M54.50 oxyCODONE-acetaminophen (PERCOCET) 5-325 MG per tablet               Plan:      Patient with uncontrolled COPD. He is having difficulty as he does not have portable oxygen as he had in the past due to insurance coverage. Plan to have appointment with Pulmonology moved up. Hypertension in office is stable. For now, continue lasix and hydrochlorothiazide-lisinopril once daily. I advised patient to track blood pressure the next week twice daily. He is to call the office with readings. Pending readings we will adjust medication. A1C has slightly increased to 6.2. I stressed the importance of healthy dietary habits as he does not want to take medication. Discussed reducing processed carbohydrates and sugars. 4,5. I had a long discussion with patient about management of pain. Discussed that I will not prescribe over #30 Percocet tablets per month. If back pain continues to limit function then I do recommend specialist.    Return in about 3 months (around 10/19/2022) for medication recheck.     Orders Placed This Encounter   Procedures    POCT glycosylated hemoglobin (Hb A1C)         Patient given educational materials - see

## 2022-07-20 DIAGNOSIS — M16.0 PRIMARY OSTEOARTHRITIS OF BOTH HIPS: ICD-10-CM

## 2022-07-20 DIAGNOSIS — M54.50 ACUTE RIGHT-SIDED LOW BACK PAIN WITHOUT SCIATICA: ICD-10-CM

## 2022-07-20 RX ORDER — OXYCODONE HYDROCHLORIDE AND ACETAMINOPHEN 5; 325 MG/1; MG/1
1 TABLET ORAL EVERY 4 HOURS PRN
Qty: 21 TABLET | Refills: 0 | OUTPATIENT
Start: 2022-07-20 | End: 2022-07-27

## 2022-07-20 RX ORDER — OXYCODONE HYDROCHLORIDE AND ACETAMINOPHEN 5; 325 MG/1; MG/1
1 TABLET ORAL EVERY 4 HOURS PRN
Qty: 30 TABLET | Refills: 0 | Status: SHIPPED | OUTPATIENT
Start: 2022-07-20 | End: 2022-09-02 | Stop reason: SDUPTHER

## 2022-07-22 ENCOUNTER — OFFICE VISIT (OUTPATIENT)
Dept: PULMONOLOGY | Age: 62
End: 2022-07-22
Payer: COMMERCIAL

## 2022-07-22 VITALS
HEART RATE: 108 BPM | BODY MASS INDEX: 34.71 KG/M2 | SYSTOLIC BLOOD PRESSURE: 122 MMHG | WEIGHT: 229 LBS | DIASTOLIC BLOOD PRESSURE: 76 MMHG | OXYGEN SATURATION: 77 % | HEIGHT: 68 IN

## 2022-07-22 DIAGNOSIS — J96.11 CHRONIC HYPOXEMIC RESPIRATORY FAILURE (HCC): ICD-10-CM

## 2022-07-22 DIAGNOSIS — Z99.89 OSA ON CPAP: ICD-10-CM

## 2022-07-22 DIAGNOSIS — Z87.891 STOPPED SMOKING WITH GREATER THAN 40 PACK YEAR HISTORY: ICD-10-CM

## 2022-07-22 DIAGNOSIS — J44.9 STAGE 4 VERY SEVERE COPD BY GOLD CLASSIFICATION (HCC): Primary | ICD-10-CM

## 2022-07-22 DIAGNOSIS — Z87.891 PERSONAL HISTORY OF TOBACCO USE: ICD-10-CM

## 2022-07-22 DIAGNOSIS — C32.9 LARYNGEAL CANCER (HCC): ICD-10-CM

## 2022-07-22 DIAGNOSIS — G47.33 OSA ON CPAP: ICD-10-CM

## 2022-07-22 PROCEDURE — 1036F TOBACCO NON-USER: CPT | Performed by: INTERNAL MEDICINE

## 2022-07-22 PROCEDURE — G8417 CALC BMI ABV UP PARAM F/U: HCPCS | Performed by: INTERNAL MEDICINE

## 2022-07-22 PROCEDURE — 3017F COLORECTAL CA SCREEN DOC REV: CPT | Performed by: INTERNAL MEDICINE

## 2022-07-22 PROCEDURE — 99213 OFFICE O/P EST LOW 20 MIN: CPT | Performed by: INTERNAL MEDICINE

## 2022-07-22 PROCEDURE — 3023F SPIROM DOC REV: CPT | Performed by: INTERNAL MEDICINE

## 2022-07-22 PROCEDURE — G8427 DOCREV CUR MEDS BY ELIG CLIN: HCPCS | Performed by: INTERNAL MEDICINE

## 2022-07-22 RX ORDER — UMECLIDINIUM BROMIDE AND VILANTEROL TRIFENATATE 62.5; 25 UG/1; UG/1
1 POWDER RESPIRATORY (INHALATION) EVERY MORNING
Qty: 1 EACH | Refills: 11 | Status: SHIPPED | OUTPATIENT
Start: 2022-07-22

## 2022-07-22 ASSESSMENT — ENCOUNTER SYMPTOMS
SINUS PAIN: 0
CONSTIPATION: 0
SHORTNESS OF BREATH: 1
SHORTNESS OF BREATH: 1
EYE PAIN: 0
EYES NEGATIVE: 1
COUGH: 1
COUGH: 1
RHINORRHEA: 0
WHEEZING: 1
DIARRHEA: 0
NAUSEA: 0
VOMITING: 0
BACK PAIN: 1
ABDOMINAL PAIN: 0

## 2022-07-22 NOTE — PATIENT INSTRUCTIONS
Faanahi order and note to new DME. Will call him after discussing with Vernell. Need to verify they take his insurance for his oxygen.  Pt. used to have [facility] but they no longer take his insurance; printed AVS for patient  mariana

## 2022-07-22 NOTE — PROGRESS NOTES
Subjective:      Patient ID: Lucía Boles is a 57 y.o. male being seen in my clinic for   Chief Complaint   Patient presents with    Follow-up     Follow up for oxygen        HPI  Follow-up visit for stage IV COPD complicated by chronic hypoxemic respiratory failure on long-term oxygen. Patient states that he has been on nocturnal oxygen since 2008. Currently on continuous at 3 L a minute. Oxygen saturations typically run around 92%. His DME told him that they will no longer honor his insurance. The 2 DME's he was referred to advised him they are no longer accepting new patients. He has had no new equipment for CPAP and he would like to go off his portable oxygen conserving device and switch to continuous flow. Short of breath at rest and with minimal exertion. Baseline. Not currently on bronchodilators. \"I try them and they do not help. \"  Not clear what medications he has been on. I suggested LABA/LAMA as a therapeutic trial.  Up-to-date with his COVID vaccinations. Quit smoking 12 years ago. Review of Systems   Constitutional: Negative. HENT: Negative. Eyes: Negative. Respiratory:  Positive for cough, shortness of breath and wheezing. Cardiovascular: Negative. All other systems reviewed and are negative. Objective:     Vitals:    07/22/22 1415   BP: 122/76   Pulse: (!) 108   SpO2: (!) 77%   Weight: 229 lb (103.9 kg)   Height: 5' 8\" (1.727 m)     Current Outpatient Medications   Medication Sig Dispense Refill    umeclidinium-vilanterol (ANORO ELLIPTA) 62.5-25 MCG/INH AEPB inhaler Inhale 1 puff into the lungs every morning 1 each 11    oxyCODONE-acetaminophen (PERCOCET) 5-325 MG per tablet Take 1 tablet by mouth every 4 hours as needed for Pain for up to 30 days.  30 tablet 0    furosemide (LASIX) 20 MG tablet take 1 tablet by mouth once daily 30 tablet 2    aspirin (RA ASPIRIN EC) 81 MG EC tablet take 1 tablet by mouth once daily 90 tablet 0    pantoprazole (PROTONIX) 40 MG tablet take 1 tablet by mouth once daily 90 tablet 1    lisinopril-hydroCHLOROthiazide (PRINZIDE;ZESTORETIC) 20-12.5 MG per tablet Take 2 tablets by mouth daily 180 tablet 0    albuterol (PROVENTIL) (2.5 MG/3ML) 0.083% nebulizer solution Take 3 mLs by nebulization every 6 hours as needed for Wheezing 120 each 0    atorvastatin (LIPITOR) 20 MG tablet Take 1 tablet by mouth daily 30 tablet 0    Blood Pressure Monitoring (SPHYGMOMANOMETER) MISC Use it daily as needed 1 each 0    guaiFENesin (MUCINEX) 600 MG extended release tablet take 1 tablet by mouth twice a day if needed for congestion 60 tablet 0    Handicap Placard MISC by Does not apply route Expires 2/13/2025  Diagnosis: COPD 1 each 0     No current facility-administered medications for this visit. Physical Exam  Vitals and nursing note reviewed. Constitutional:       Appearance: He is well-developed. He is obese. HENT:      Nose: Nose normal. No congestion. Mouth/Throat:      Mouth: Mucous membranes are moist.      Pharynx: Oropharynx is clear. No oropharyngeal exudate. Comments: Large tongue , low hanging soft palate and large uvula. Overall pharyngeal orifice moderately decreased    Eyes:      General: No scleral icterus. Conjunctiva/sclera: Conjunctivae normal.   Neck:      Thyroid: No thyromegaly. Vascular: No JVD. Trachea: No tracheal deviation. Cardiovascular:      Rate and Rhythm: Normal rate and regular rhythm. Heart sounds: Normal heart sounds. No murmur heard. No gallop. Pulmonary:      Effort: Respiratory distress present. Breath sounds: No wheezing or rales. Comments: AP diameter of chest increased. Thoracic expansion and diaphragmatic excursion diminished. BS diminished and expiratory phase prolonged. No dullness to percussion or tenderness to palpation. Chest:      Chest wall: No tenderness. Abdominal:      Palpations: Abdomen is soft. Tenderness: There is no abdominal tenderness. Musculoskeletal:      Cervical back: Neck supple. Right lower leg: No edema. Left lower leg: No edema. Comments: No clubbing   Lymphadenopathy:      Cervical: No cervical adenopathy. Skin:     General: Skin is warm and dry. Neurological:      Mental Status: He is alert and oriented to person, place, and time. Wt Readings from Last 3 Encounters:   07/22/22 229 lb (103.9 kg)   07/19/22 230 lb 9.6 oz (104.6 kg)   04/08/22 222 lb 9.6 oz (101 kg)     Results for orders placed or performed in visit on 07/19/22   POCT glycosylated hemoglobin (Hb A1C)   Result Value Ref Range    Hemoglobin A1C 6.2 %       :      1. Stage 4 very severe COPD by GOLD classification ([facility]    2. Chronic hypoxemic respiratory failure (HCC)    3. Stopped smoking with greater than 40 pack year history    4. SHAMAR on CPAP    5. Laryngeal cancer ([facility]    6. Personal history of tobacco use      Patient Active Problem List   Diagnosis    Laryngeal cancer ([facility]    Osteoarthritis    Heart burn    Colon polyp    COPD (chronic obstructive pulmonary disease) (HCC)    Headache    Neck pain    HTN (hypertension)    Atypical chest pain    Obstructive sleep apnea    Vocal cord polyp    Reflux laryngitis    Dysphagia    Coronary artery disease    High cholesterol    Chronic neck pain    Chronic headache    Hip arthritis    Chronic intermittent hypoxia with obstructive sleep apnea    Impaired fasting glucose    Leukoplakia of vocal cords    Colitis    Hepatic steatosis    S/P colonoscopy with polypectomy    Rectal polyp    Adenomatous polyp    Mixed hyperlipidemia    Prediabetes    Type 2 diabetes mellitus with other specified complication, unspecified whether long term insulin use ([facility]         Plan:      Discontinue oxygen conserving device on portable oxygen. Discussed risks, benefits, and rationale and he accepts. Trial of Anoro 1 puff daily. Continue CPAP. New mask, tubing, and supplies. Weight loss.   Will help sleep apnea and also is the most modifiable component in his exercise intolerance. Flu shot in fall. Continue oxygen 3 L a minute. Return in 6 months. Low-dose screening CT scan per routine. Orders Placed This Encounter   Medications    umeclidinium-vilanterol (ANORO ELLIPTA) 62.5-25 MCG/INH AEPB inhaler     Sig: Inhale 1 puff into the lungs every morning     Dispense:  1 each     Refill:  11     Orders Placed This Encounter   Procedures     - MI DURABLE MEDICAL EQUIPMENT MI     Discontinue oxygen conservation device and switch portable system 3lpm continuous flow. MI DURABLE MEDICAL EQUIPMENT MI     New CPAP mask tubing and supplies. Return in about 6 months (around 1/22/2023).        Electronically signed by Kayce Nava DO on 7/22/2022at 2:52 PM

## 2022-07-27 ENCOUNTER — TELEPHONE (OUTPATIENT)
Dept: PULMONOLOGY | Age: 62
End: 2022-07-27

## 2022-07-27 DIAGNOSIS — J44.9 STAGE 4 VERY SEVERE COPD BY GOLD CLASSIFICATION (HCC): Primary | ICD-10-CM

## 2022-07-27 DIAGNOSIS — G47.33 OSA ON CPAP: ICD-10-CM

## 2022-07-27 DIAGNOSIS — Z99.89 OSA ON CPAP: ICD-10-CM

## 2022-08-04 ENCOUNTER — TELEPHONE (OUTPATIENT)
Dept: PULMONOLOGY | Age: 62
End: 2022-08-04

## 2022-08-04 NOTE — TELEPHONE ENCOUNTER
Patient called asking where his CPAP supply order went to. I verified with [address] that patients CPAP supply order was sent along with his oxygen order. Lewis Diez stated they do have it was just attached incorrectly in their system. But they will get it taken care of and out to the patient soon. LVM for patient regarding this.

## 2022-09-02 DIAGNOSIS — M54.50 ACUTE RIGHT-SIDED LOW BACK PAIN WITHOUT SCIATICA: ICD-10-CM

## 2022-09-02 DIAGNOSIS — M16.0 PRIMARY OSTEOARTHRITIS OF BOTH HIPS: ICD-10-CM

## 2022-09-02 RX ORDER — OXYCODONE HYDROCHLORIDE AND ACETAMINOPHEN 5; 325 MG/1; MG/1
1 TABLET ORAL EVERY 4 HOURS PRN
Qty: 30 TABLET | Refills: 0 | Status: SHIPPED | OUTPATIENT
Start: 2022-09-02 | End: 2022-10-21

## 2022-09-12 ENCOUNTER — TELEPHONE (OUTPATIENT)
Dept: ONCOLOGY | Age: 62
End: 2022-09-12

## 2022-09-12 NOTE — LETTER
9/12/2022        Suresh To  [address]  [facility] [address]    Dear Suresh To: Your healthcare provider has ordered a low dose CT scan of the chest for lung cancer screening. Enclosed you will find information about CT lung screening and smoking cessation resources. If you are unable to keep you appointment for you CT lung screening, please call our scheduling department at [phone]    Keep in mind that CT lung screening does not take the place of smoking cessation. Please do not hesitate to contact me if you have any questions or concerns.     [address] [facility],      [facility] Screening [facility]  [phone]

## 2022-09-13 ENCOUNTER — TELEPHONE (OUTPATIENT)
Dept: PRIMARY CARE CLINIC | Age: 62
End: 2022-09-13

## 2022-09-13 DIAGNOSIS — M79.672 LEFT FOOT PAIN: Primary | ICD-10-CM

## 2022-09-13 NOTE — TELEPHONE ENCOUNTER
Spoke with the patient and informed in of Dr. Lonnie Scott contact information, the patient verbalized understanding.

## 2022-09-13 NOTE — TELEPHONE ENCOUNTER
Patient is requesting a referral to a foot specialist. Patient states that his left foot is hurting and cannot walk on it still x 7days. Patient did go to ER as advised by pcp and they could not find anything and xray came back with nothing on it. Please advise.

## 2022-09-27 DIAGNOSIS — I10 ESSENTIAL HYPERTENSION: ICD-10-CM

## 2022-09-27 RX ORDER — LISINOPRIL AND HYDROCHLOROTHIAZIDE 20; 12.5 MG/1; MG/1
TABLET ORAL
Qty: 180 TABLET | Refills: 0 | Status: SHIPPED | OUTPATIENT
Start: 2022-09-27

## 2022-09-29 DIAGNOSIS — I10 PRIMARY HYPERTENSION: ICD-10-CM

## 2022-09-29 DIAGNOSIS — I25.10 CORONARY ARTERY DISEASE INVOLVING NATIVE CORONARY ARTERY OF NATIVE HEART WITHOUT ANGINA PECTORIS: ICD-10-CM

## 2022-09-29 RX ORDER — FUROSEMIDE 20 MG/1
TABLET ORAL
Qty: 30 TABLET | Refills: 2 | Status: SHIPPED | OUTPATIENT
Start: 2022-09-29

## 2022-09-29 RX ORDER — ASPIRIN 81 MG/1
TABLET ORAL
Qty: 90 TABLET | Refills: 0 | Status: SHIPPED | OUTPATIENT
Start: 2022-09-29

## 2022-10-05 ENCOUNTER — TELEPHONE (OUTPATIENT)
Dept: PULMONOLOGY | Age: 62
End: 2022-10-05

## 2022-10-19 NOTE — PROGRESS NOTES
[facility] CARE  [facility] [address] [facility] [address]  [address] [address]  Dept: [phone]  Dept Fax: [phone]    Jocelyn Guadarrama is a 57 y.o. male who presents today for his medical conditions/complaints as noted below. Chief Complaint   Patient presents with    Diabetes     3 mo f/u    Discuss Medications     Discuss Golo       HPI:     Patient presents to the office for routine follow-up. He has past medical history including hypertension, obesity, COPD, laryngeal cancer, obstructive sleep apnea, coronary artery disease, type 2 diabetes, arthritis. Patient does mention concern for intermittent chest discomfort/pain with exertion. This is slowly increased the past couple months. He does have his baseline shortness of breath related to COPD/hypoxia. He no longer has cardiologist as his previous one retired. At rest he denies any discomfort of the chest.  Patient has known cardiovascular risk factors and coronary artery disease. This has not been evaluated recently. He also notes elevated blood pressure. This has been a recurrent issue. States at home readings are typically above 130/90. Patient reports he is compliant with current medication regimen. Denies any active chest pain. No lightheadedness, dizziness, syncope. BP is slightly elevated at office. Weight slightly increased from last office visit.       Hemoglobin A1C (%)   Date Value   10/19/2022 6.1   2022 6.2   2022 5.6             ( goal A1C is < 7)   No results found for: LABMICR  LDL Cholesterol (mg/dL)   Date Value   2021 138 (H)   2020 127   2018 126       (goal LDL is <100)   AST (U/L)   Date Value   2021 15     ALT (U/L)   Date Value   2021 19     BUN (mg/dL)   Date Value   2022 57 (H)     BP Readings from Last 3 Encounters:   10/19/22 (!) 144/98   22 122/76   22 130/82          (goal 120/80)    Past Medical History: Diagnosis Date    Adenomatous polyp 8/15/2017    Repeat colonoscopy recommended to patient in 2020. Chronic headache 11/6/2014    Chronic intermittent hypoxia with obstructive sleep apnea     Chronic neck pain 11/6/2014    Colitis 5/8/2017    CT scan dated May 6, 2017 reviewed. Colon polyp     COPD (chronic obstructive pulmonary disease) ([facility] [address]     Full dentures     Upper & Lower    Headache 12/7/2011    See's neurology. Heart burn     GERD    Hepatic steatosis 6/2/2017    High cholesterol 6/25/2014    Hip arthritis 11/7/2014    Hyperlipidemia     Hypertension     On Meds    Impaired fasting glucose 12/3/2015    Monitor     Laryngeal cancer [facility])     Consultant: Dr. Dee Canela dated June 11, 2015 from Dr. Marce Jackson reviewed on 07/24/2015, see Media tab of Chart Review for more information. Correspondence dated September 23, 2015 from Dr. Lv Berg reviewed on 9/24/2015, see Media tab of Chart Review for more informatiion Correspondence dated October 4, 2016 from H&P from [facility] reviewed on 10/4/2016, see Media tab of Chart Review     Larynx cancer ([facility] [address] 2007    On Rad. Tx.    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 Lv Berg reviewed on 9/13/16, see Media tab of Chart Review for more information. Migraines     Mixed hyperlipidemia 11/5/2018    Neck pain 4/16/2012    Obstructive sleep apnea 3/15/2013    Clinical Polysomnography dated January 29, 2013 and February 3, 2013 from Dr. Azalia Aldridge reviewed on 03/15/2013 see Media tab of Chart Review for more information. Correspondence dated 4/30/13 from Dr. Denisse Arteaga reviewed on 05/02/2013, see Media tab of Chart Review for more information.      On home oxygen therapy 2013    2L/NC nightly    Osteoarthritis     rt. hip pain    Prediabetes 9/6/2019    Rectal polyp 8/11/2017    Repeat colonoscopy due on or around August 11, 2022    Reflux laryngitis 5/22/2013    Correspondence dated May 21, 2013 from Dr. Lv Berg reviewed on 2013, see Media tab of Chart Review for more information. Correspondence dated May 17, 2018 from Dr. Rachel Santana reviewed on 18, see Media tab of Chart Review for more information. S/P colonoscopy with polypectomy 2017    Sleep apnea     CPAP with Oxygen nightly    Vocal cord polyp 2013    Correspondence dated 4/15/13 from Dr. Rachel Santana reviewed on 2013 see Media tab of Chart Review for more information. Correspondence dated 13 from Dr. Rachel Santana reviewed on 2013 see Media tab of Chart Review for more information.      Wears eyeglasses       Past Surgical History:   Procedure Laterality Date    CATARACT REMOVAL Bilateral     MULTIPLE MORE ON LT    COLONOSCOPY      5 Polyps removed    COLONOSCOPY  2017    with biopsy    HIP ARTHROPLASTY Right 14    LARYNGOSCOPY  10/17/2016    with biopsies    MICROLARYNGOSCOPY W BIOPSY      ME COLSC FLX W/REMOVAL LESION BY HOT BX FORCEPS N/A 2017    COLONOSCOPY POLYPECTOMY HOT BIOPSY performed by Marciano Castillo MD at [address] [place] [address]       Family History   Problem Relation Age of Onset    Stomach Cancer Mother     Lung Cancer Mother     Lung Cancer Brother     Esophageal Cancer Brother        Social History     Tobacco Use    Smoking status: Former     Packs/day: 2.00     Years: 45.00     Pack years: 90.00     Types: Cigarettes     Start date: 10/4/1970     Quit date: 2013     Years since quittin.4    Smokeless tobacco: Never    Tobacco comments:     quit 3 yrs ago   Substance Use Topics    Alcohol use: No     Alcohol/week: 0.0 standard drinks      Current Outpatient Medications   Medication Sig Dispense Refill    amLODIPine (NORVASC) 5 MG tablet Take 1 tablet by mouth daily 90 tablet 1    furosemide (LASIX) 20 MG tablet take 1 tablet by mouth once daily 30 tablet 2    aspirin (ASPIRIN LOW DOSE) 81 MG EC tablet take 1 tablet by mouth once daily 90 tablet 0    lisinopril-hydroCHLOROthiazide (PRINZIDE;ZESTORETIC) 20-12.5 MG per tablet take 2 tablets by mouth once daily 180 tablet 0    umeclidinium-vilanterol (ANORO ELLIPTA) 62.5-25 MCG/INH AEPB inhaler Inhale 1 puff into the lungs every morning 1 each 11    pantoprazole (PROTONIX) 40 MG tablet take 1 tablet by mouth once daily 90 tablet 1    albuterol (PROVENTIL) (2.5 MG/3ML) 0.083% nebulizer solution Take 3 mLs by nebulization every 6 hours as needed for Wheezing 120 each 0    atorvastatin (LIPITOR) 20 MG tablet Take 1 tablet by mouth daily 30 tablet 0    Blood Pressure Monitoring (SPHYGMOMANOMETER) MISC Use it daily as needed 1 each 0    guaiFENesin (MUCINEX) 600 MG extended release tablet take 1 tablet by mouth twice a day if needed for congestion 60 tablet 0    Handicap Placard MISC by Does not apply route Expires 2/13/2025  Diagnosis: COPD 1 each 0     No current facility-administered medications for this visit. No Known Allergies    Health Maintenance   Topic Date Due    Diabetic retinal exam  Never done    DTaP/Tdap/Td vaccine (1 - Tdap) Never done    Shingles vaccine (1 of 2) Never done    COVID-19 Vaccine (3 - Booster for Moderna series) 09/01/2021    Low dose CT lung screening  09/16/2022    Lipids  09/30/2022    Diabetic foot exam  04/08/2023 (Originally 2/25/1970)    Diabetic microalbuminuria test  04/08/2023 (Originally 2/25/1978)    Prostate Specific Antigen (PSA) Screening or Monitoring  01/07/2023    Pneumococcal 0-64 years Vaccine (3 - PPSV23 or PCV20) 02/25/2023    Depression Screen  07/19/2023    A1C test (Diabetic or Prediabetic)  10/19/2023    Colorectal Cancer Screen  08/11/2027    Flu vaccine  Completed    Hepatitis C screen  Completed    HIV screen  Completed    Hepatitis A vaccine  Aged Out    Hib vaccine  Aged Out    Meningococcal (ACWY) vaccine  Aged Out       Subjective:      Review of Systems   Constitutional:  Negative for chills, fatigue and fever. HENT:  Negative for congestion, rhinorrhea and sinus pain. Eyes:  Negative for pain. Respiratory:  Positive for shortness of breath. Negative for cough. Cardiovascular:  Positive for chest pain. Negative for leg swelling. Gastrointestinal:  Negative for abdominal pain, constipation, diarrhea, nausea and vomiting. Genitourinary:  Negative for difficulty urinating, enuresis and testicular pain. Musculoskeletal:  Positive for back pain. Negative for arthralgias and myalgias. Skin:  Negative for rash. Neurological:  Negative for dizziness and headaches. Psychiatric/Behavioral:  Negative for confusion and sleep disturbance. The patient is not nervous/anxious. All other systems reviewed and are negative. Objective:     Physical Exam  Vitals and nursing note reviewed. Constitutional:       General: He is not in acute distress. Appearance: Normal appearance. HENT:      Head: Normocephalic. Mouth/Throat:      Mouth: Mucous membranes are moist.   Eyes:      Extraocular Movements: Extraocular movements intact. Conjunctiva/sclera: Conjunctivae normal.      Pupils: Pupils are equal, round, and reactive to light. Cardiovascular:      Rate and Rhythm: Normal rate and regular rhythm. Pulses: Normal pulses. Heart sounds: Normal heart sounds. Pulmonary:      Effort: Pulmonary effort is normal. No respiratory distress. Breath sounds: Normal breath sounds. Abdominal:      General: Abdomen is flat. Musculoskeletal:      Cervical back: Normal range of motion. Right lower leg: No edema. Left lower leg: No edema. Lymphadenopathy:      Cervical: No cervical adenopathy. Skin:     General: Skin is warm. Capillary Refill: Capillary refill takes less than 2 seconds. Neurological:      General: No focal deficit present. Mental Status: He is alert and oriented to person, place, and time.    Psychiatric:         Mood and Affect: Mood normal.         Behavior: Behavior normal.     BP (!) 144/98   Pulse 97   Wt 235 lb (106.6 kg)   SpO2 (!) 84%   BMI 35.73 kg/m²     Assessment:       ICD-10-CM    1. Type 2 diabetes mellitus with other specified complication, unspecified whether long term insulin use (HCC)  E11.69 POCT glycosylated hemoglobin (Hb A1C)      2. Primary hypertension  I10 amLODIPine (NORVASC) 5 MG tablet      3. Coronary artery disease involving native coronary artery of native heart with angina pectoris ([facility] [address]  I25.119 Christian Mendoza DO, Cardiology, [facility]     NM MYOCARDIAL SPECT REST EXERCISE OR RX      4. Need for influenza vaccination  Z23 Influenza, FLUCELVAX, (age 10 mo+), IM, Preservative Free, 0.5 mL               Plan:      1. A1c is stable. We discussed the importance of diet for glycemic control. 2.  Blood pressure is elevated and home recordings are elevated. He is given prescription of amlodipine 5 mg once daily in addition to his current lisinopril-hydrochlorothiazide. 3.  Patient with intermittent chest pain/shortness of breath. He is given order for stress test and referral to cardiology. I discussed the importance of close follow-up. If he has unrelenting/persistent chest pain I advised on immediate ED evaluation. 4.  Patient agreeable to influenza vaccine. Return in about 3 months (around 1/19/2023) for medication recheck, diabetes, blood pressure.     Orders Placed This Encounter   Procedures    NM MYOCARDIAL SPECT REST EXERCISE OR RX     Standing Status:   Future     Standing Expiration Date:   10/19/2023     Order Specific Question:   Reason for Exam?     Answer:   Chest pain     Order Specific Question:   Reason for Exam?     Answer:   Shortness of breath     Order Specific Question:   Procedure Type     Answer:   Rx    Influenza, FLUCELVAX, (age 10 mo+), IM, Preservative Free, 0.5 mL    Christian Mendoza DO, Cardiology, [facility]     Referral Priority:   Urgent     Referral Type:   Eval and Treat     Referral Reason:   Specialty Services Required     Referred to Provider:   Jess Mcdaniel DO     Requested Specialty:   Cardiology     Number of Visits Requested:   1    POCT glycosylated hemoglobin (Hb A1C)         Patient given educational materials - see patient instructions. Discussed use, benefit, and side effects of prescribed medications. All patient questions answered. Pt voiced understanding. Reviewed health maintenance. Instructed to continue current medications, diet and exercise. Patient agreed with treatment plan. Follow up as directed.         Electronically signed by Robbi Avalos PA-C on 10/19/2022 at 11:27 AM

## 2022-10-24 NOTE — PROGRESS NOTES
[address]  Dept: [phone]    NEW PATIENT PROGRESS NOTE  Date of patient's visit: 10/24/2022  Patient's Name:  Marilyn Lagos YOB: 1960            Patient Care Team:  Andry Maier PA-C as PCP - General (Physician Assistant)  Andry Maier PA-C as PCP - [facility]  Germán Gurrola DO as Consulting Physician (Pulmonology)  Tanvir Rutledge MD as Surgeon (Radiation Oncology)        Chief Complaint   Patient presents with    New Patient     Establish care    Foot Pain     Bl feet x 2 months         HPI:   Marilyn Lagos is a 57 y.o. male who presents to the office today complaining of bilateral foot pain. Symptoms began 2 month(s) ago. Patient relates pain is Absent . Pain is rated 0 out of 10 and is described as none. Treatments prior to today's visit include: visit to emergency room, xrays, visit with pcp. Currently denies F/C/N/V. Pt's primary care physician is Deborah Garcia last seen October 19 2022. Patient states one morning he woke up and could not put any pressure on his left foot. He states he has right foot pain as well and does have a history of right foot injury. No Known Allergies    Past Medical History:   Diagnosis Date    Adenomatous polyp 8/15/2017    Repeat colonoscopy recommended to patient in 2020. Chronic headache 11/6/2014    Chronic intermittent hypoxia with obstructive sleep apnea     Chronic neck pain 11/6/2014    Colitis 5/8/2017    CT scan dated May 6, 2017 reviewed. Colon polyp     COPD (chronic obstructive pulmonary disease) ([facility] [address]     Full dentures     Upper & Lower    Headache 12/7/2011    See's neurology.      Heart burn     GERD    Hepatic steatosis 6/2/2017    High cholesterol 6/25/2014    Hip arthritis 11/7/2014    Hyperlipidemia     Hypertension     On Meds    Impaired fasting glucose 12/3/2015    Monitor Laryngeal cancer [facility])     Consultant: Dr. Olsen Grade dated June 11, 2015 from Dr. Ravin Kimble reviewed on 07/24/2015, see Media tab of Chart Review for more information. Correspondence dated September 23, 2015 from Dr. Leidy Ellis reviewed on 9/24/2015, see Media tab of Chart Review for more informatiion Correspondence dated October 4, 2016 from H&P from [facility] reviewed on 10/4/2016, see Media tab of Chart Review     Larynx cancer ([facility] [address] 2007    On Rad. Tx.    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 Leidy Ellis reviewed on 9/13/16, see Media tab of Chart Review for more information. Migraines     Mixed hyperlipidemia 11/5/2018    Neck pain 4/16/2012    Obstructive sleep apnea 3/15/2013    Clinical Polysomnography dated January 29, 2013 and February 3, 2013 from Dr. Yoel Mendoza reviewed on 03/15/2013 see Media tab of Chart Review for more information. Correspondence dated 4/30/13 from Dr. Christophe Benson reviewed on 05/02/2013, see Media tab of Chart Review for more information. On home oxygen therapy 2013    2L/NC nightly    Osteoarthritis     rt. hip pain    Prediabetes 9/6/2019    Rectal polyp 8/11/2017    Repeat colonoscopy due on or around August 11, 2022    Reflux laryngitis 5/22/2013    Correspondence dated May 21, 2013 from Dr. Leidy Ellis reviewed on 05/22/2013, see Media tab of Chart Review for more information. Correspondence dated May 17, 2018 from Dr. Leidy Ellis reviewed on 5/17/18, see Media tab of Chart Review for more information. S/P colonoscopy with polypectomy 8/11/2017    Sleep apnea 2013    CPAP with Oxygen nightly    Vocal cord polyp 4/19/2013    Correspondence dated 4/15/13 from Dr. Leidy Ellis reviewed on 04/19/2013 see Media tab of Chart Review for more information. Correspondence dated 4/23/13 from Dr. Leidy Ellis reviewed on 04/27/2013 see Media tab of Chart Review for more information.      Wears eyeglasses        Prior to Admission medications    Medication Sig Start Date End Date Taking? Authorizing Provider   oxyCODONE-acetaminophen (PERCOCET) 5-325 MG per tablet Take 1 tablet by mouth every 6 hours as needed for Pain for up to 30 days.  10/21/22 11/20/22 Yes Andry Maier PA-C   amLODIPine (NORVASC) 5 MG tablet Take 1 tablet by mouth daily 10/19/22  Yes Andry Maier PA-C   furosemide (LASIX) 20 MG tablet take 1 tablet by mouth once daily 9/29/22  Yes Andry Maier PA-C   aspirin (ASPIRIN LOW DOSE) 81 MG EC tablet take 1 tablet by mouth once daily 9/29/22  Yes Andry Maier PA-C   lisinopril-hydroCHLOROthiazide (PRINZIDE;ZESTORETIC) 20-12.5 MG per tablet take 2 tablets by mouth once daily 9/27/22  Yes Andry Maier PA-C   umeclidinium-vilanterol (ANORO ELLIPTA) 62.5-25 MCG/INH AEPB inhaler Inhale 1 puff into the lungs every morning 7/22/22  Yes Ruchi Cardoso, DO   pantoprazole (PROTONIX) 40 MG tablet take 1 tablet by mouth once daily 5/25/22  Yes Andry Maier PA-C   albuterol (PROVENTIL) (2.5 MG/3ML) 0.083% nebulizer solution Take 3 mLs by nebulization every 6 hours as needed for Wheezing 1/7/22  Yes Andry Maier PA-C   atorvastatin (LIPITOR) 20 MG tablet Take 1 tablet by mouth daily 10/1/21  Yes Andry Maier PA-C   Blood Pressure Monitoring QUARTERMAIN) MISC Use it daily as needed 3/26/21  Yes Tray Mclaughlin MD   guaiFENesin (MUCINEX) 600 MG extended release tablet take 1 tablet by mouth twice a day if needed for congestion 6/4/20  Yes Tray Mclaughlin MD   [facility] [address] [facility] by Does not apply route Expires 2/13/2025  Diagnosis: COPD 2/17/20  Yes Ruchi Cardoso DO       Past Surgical History:   Procedure Laterality Date    CATARACT REMOVAL Bilateral     MULTIPLE MORE ON LT    COLONOSCOPY  2011    5 Polyps removed    COLONOSCOPY  08/11/2017    with biopsy    HIP ARTHROPLASTY Right 11/7/14    LARYNGOSCOPY  10/17/2016    with biopsies    MICROLARYNGOSCOPY W BIOPSY      MA COLSC FLX W/REMOVAL LESION BY HOT BX FORCEPS N/A 8/11/2017 COLONOSCOPY POLYPECTOMY HOT BIOPSY performed by Jacy Diaz MD at [address] [place] [address]       Family History   Problem Relation Age of Onset    Stomach Cancer Mother     Lung Cancer Mother     Lung Cancer Brother     Esophageal Cancer Brother        Social History     Tobacco Use    Smoking status: Former     Packs/day: 2.00     Years: 45.00     Pack years: 90.00     Types: Cigarettes     Start date: 10/4/1970     Quit date: 2013     Years since quittin.4    Smokeless tobacco: Never    Tobacco comments:     quit 3 yrs ago   Substance Use Topics    Alcohol use: No     Alcohol/week: 0.0 standard drinks       Review of Systems    Review of Systems:   History obtained from chart review and the patient  General ROS: negative for - chills, fatigue, fever, night sweats or weight gain  Constitutional: Negative for chills, diaphoresis, fatigue, fever and unexpected weight change. Musculoskeletal: Positive for arthralgias, gait problem and joint swelling. Neurological ROS: negative for - behavioral changes, confusion, headaches or seizures. Negative for weakness and numbness. Dermatological ROS: negative for - mole changes, rash  Cardiovascular: Negative for leg swelling. Gastrointestinal: Negative for constipation, diarrhea, nausea and vomiting. Lower Extremity Physical Examination:   Vitals: There were no vitals filed for this visit. General: AAO x 3 in NAD. Dermatologic Exam:  Skin lesion/ulceration Absent . Skin No rashes or nodules noted. .       Musculoskeletal:     1st MPJ ROM decreased, Bilateral.  Muscle strength 5/5, Bilateral. POP of the right and left plantar medial calcaneal tuberosity. Pain increased with Dorsiflexion of the right and left lesser toes. Negative pain on compression of the calcaneus bilaterally Medial longitudinal arch, Bilateral WNL.   Ankle ROM WNL,Bilateral.    Dorsally contracted digits absent digits 1-5 Bilateral.     Vascular: DP and PT pulses palpable 2/4, Bilateral.  CFT <3 seconds, Bilateral.  Hair growth present to the level of the digits, Bilateral.  Edema absent, Bilateral.  Varicosities absent, Bilateral. Erythema absent, Bilateral    Neurological: Sensation intact to light touch to level of digits, Bilateral.  Protective sensation intact 10/10 sites via 5.07/10g Dyer-Claudia Monofilament, Bilateral.  negative Tinel's, Bilateral.  negative Valleix sign, Bilateral.      Integument: Warm, dry, supple, Bilateral.  Open lesion absent, Bilateral.  Interdigital maceration absent to web spaces 1-4, Bilateral.  Nails are normal in length, thickness and color 1-5 bilateral.  Fissures absent, Bilateral.      Asessment: Patient is a 57 y.o. male with:    Diagnosis Orders   1. Plantar fasciitis, bilateral        2. Pain in both feet              Plan: Patient examined and evaluated. Current condition and treatment options discussed in detail. Discussed conservative and surgical options with the patient. Advised pt to his condition       Arch Pain: Exercises  Introduction  Here are some examples of exercises for you to try. The exercises may be suggested for a condition or for rehabilitation. Start each exercise slowly. Ease off the exercises if you start to have pain. You will be told when to start these exercises and which ones will work best for you. How to do the exercises  Plantar fascia stretch    Sit in a chair and put your affected foot on your other knee. Hold the heel of your foot in one hand, and grasp your toes with the other hand. Pull on your heel (toward your body), and at the same time pull your toes back with your other hand. You should feel a stretch along the bottom of your foot. Hold 15 to 30 seconds. Repeat 2 to 4 times. Plantar fascia stretch (kneeling)    Get on your hands and knees on the floor. Keep your heels pointing up and the balls of your feet and your toes on the floor. Slowly sit back toward your ankles.   If this is too hard, you can try doing it one leg at a time. Stand up, and then kneel on one knee and keep the other leg forward. Place the foot of your forward leg flat on the ground and bend that knee. The heel on the leg still behind you should point up. The ball and toes of that foot should be on the floor. Sit back toward that ankle. Hold 15 to 30 seconds. Repeat 2 to 4 times. Switch legs if you are doing this one leg at a time. Plantar fascia self-massage    Sit in a chair. Place your affected foot on a firm, tube-shaped object, such as a can or water bottle. Roll your foot back and forth over the object to massage the bottom of your foot. If you want to do ice massage, fill a water bottle about three-fourths of the way full and freeze before using. Continue for 2 to 5 minutes. Bilateral calf stretch (knees straight)    Place a book on the floor a few inches from a wall or countertop, and put the balls of your feet on it. Your heels should be on the floor. The book needs to be thick enough so that you can feel a gentle stretch in each calf. If you are not steady on your feet, hold on to a chair, counter, or wall while you do this stretch. Keep your knees straight, and lean forward until you feel a stretch in each calf. To get more stretch, add another book or use a thicker book, such as a phone book, a dictionary, or an encyclopedia. Hold the stretch for at least 15 to 30 seconds. Repeat 2 to 4 times. Bilateral calf stretch (knees bent)    Place a book on the floor a few inches from a wall or countertop, and put the balls of your feet on it. Your heels should be on the floor. The book needs to be thick enough so that you can feel a gentle stretch in each calf. If you are not steady on your feet, hold on to a chair, counter, or wall while you do this stretch. Bend your knees, and lean forward until you feel a stretch in each calf.   To get more stretch, add another book or use a thicker book, such as a phone book, a dictionary, or an encyclopedia. Hold the stretch for at least 15 to 30 seconds. Repeat 2 to 4 times. [place] pick-ups    Put some marbles on the floor next to a cup. Sit down, and use the toes of your affected foot to lift up one marble from the floor at a time. Then try to put the marble in the cup. Repeat 8 to 12 times. Towel scrunches    Sit down, and place your affected foot on a towel on the floor. You may also do this with both feet on the towel. Scrunch the towel toward you with your toes. Then use your toes to push the towel back into place. Repeat 8 to 12 times. Heel raises on a step    Stand on the bottom step of a staircase, facing up toward the stairs. Put the balls of your feet on the step. If you are not steady on your feet, hold on to the banister or wall. Keeping both knees straight, slowly lift your heels above the step so that you are standing on your toes. Then slowly lower your heels below the step and toward the floor. Return to the starting position, with your feet even with the step. Repeat 8 to 12 times. Follow-up care is a key part of your treatment and safety. Be sure to make and go to all appointments, and call your doctor if you are having problems. It's also a good idea to know your test results and keep a list of the medicines you take. Where can you learn more? Go to https://[employer]. org and sign in to your [employer] account. Enter H119 in the [facility] box to learn more about \"Arch Pain: Exercises. \"     If you do not have an account, please click on the \"Sign Up Now\" link. Current as of: September 20, 2018  Content Version: 12.1  © 3662-4461 [employer]wise, [employer]. Care instructions adapted under license by [facility] ([facility] [place]).  If you have questions about a medical condition or this instruction, always ask your healthcare professional. [place] [address] any warranty or liability for your use of this information. .  Verbal and written instructions given to patient. Contact office with any questions/problems/concerns. RTC in 9week(s).     10/24/2022    Electronically signed by Nakia Chaudhari DPM on 10/24/2022 at 9:08 AM  10/24/2022

## 2022-10-28 NOTE — PROGRESS NOTES
CST Lexiscan. Stress Tech performs patient preparation of physical comfort, review test procedures, pre-stress EKG. Lung Sounds diminished, slight wheezes upper lobes bilaterally. Pt currently using 4 L of O2 per NC. Consent verified. Educated patient on test procedure and possible side effects of Lexiscan. Cardiologist reviewed pre-test EKG and is present for test. Patient tolerated test well with minor SOB which resolved to baseline after test with caffeine. EKG portion of test complete, Nuc Med portion pending.   Pretest VS: /107 HR 80  Post test VS[de-identified] /93 HR 93

## 2022-11-09 NOTE — TELEPHONE ENCOUNTER
Patient should be taking lisinopril-hydrochlorothiazide 20-12.5 mg 2 tablets daily. His amlodipine currently is 5 mg daily. I would like for him to increase amlodipine to 10 mg daily in conjunction with current lisinopril-hydrochlorothiazide dosing. Thank you.

## 2022-11-09 NOTE — TELEPHONE ENCOUNTER
The patient called stating that he had the following BP readings:    11/08/222 after taking medication, reading at 11pm 168/87   Before taking 15/92 before that 158/90 and in am 178/95 and 135/83    11/06/22 139/89    10/25/22 137/82    The patient has been taking Lisinopril 5 mg, the patient is asking if he should increase 10mg to see if this would help with hypertension issues. Please advise.

## 2022-11-18 NOTE — TELEPHONE ENCOUNTER
Patient called and stated he was in [facility] ER last night and they stated his CPAP needed to \"blow more air\" Divya Mcclain will look into getting an order done and call patient to inform.

## 2022-11-21 NOTE — TELEPHONE ENCOUNTER
Since patient hasnt been seen since July can you please just add a note to this encounter, stating the need for a NIV Trilogy. As well as \"The patient has chronic respiratory failure, secondary to COPD and requires non-invasive ventilation via home ventilator to maintain acceptable PaCO2 levels to minimize readmissions. If CO2 levels are not maintained, this may harm and possible death to the patient. Due to the patient's chronic condition, BiPAP therapy is not effective and had been ruled out. Patient had a recent hospital stay and BIPAP was tried and failed. After further review of the patient's clinical data, patient would benefit from non-invasive ventilation with AVAPS is more clinically appropriate because of the need for augmented targeted tidal volume as demonstrated by serial ABGs. \"         Once note is completed by you I can send order for Trilogy to [address] [place] [address].    Thank you

## 2022-11-28 ENCOUNTER — TELEPHONE (OUTPATIENT)
Dept: PRIMARY CARE CLINIC | Age: 62
End: 2022-11-28

## 2022-11-28 NOTE — TELEPHONE ENCOUNTER
Dwayne Franco from [address] states patient passed from natural causes. Dwayne Franco will be dropping off death certificate later today.

## 2022-11-28 NOTE — TELEPHONE ENCOUNTER
Raghavendra Estevez from [facility] calling in office states that patient passed away at home on 22 and would like to know if one of our providers can sign the death certificate as lloyd is unable to being a PA-C. Please advise.

## 2024-03-05 NOTE — CARE COORDINATION
Ambulatory Care Coordination Note  3/22/2021  CM Risk Score: 5  [place] 10 Year Mortality Risk Score: 47%     ACC: Bethany Mcnamara, RN    Summary Note: patient was seen in the ED 3/19 with elevated BP. Outreach to review care management and enrollment. Declined care management or further calls. Feeling better and BP's improving, 147/87. Reports, has meds on hand and taking as prescribed.  He's keeping track of his BP  He will follow up with providers as needed with any questions/ concerns     Future Appointments   Date Time Provider Spike Carl   4/9/2021  1:00 PM [address]   4/29/2021 10:00 AM MD [address]

## (undated) DEVICE — 60 ML SYRINGE LUER-LOCK TIP: Brand: MONOJECT

## (undated) DEVICE — GOWN,AURORA,NONREINFORCED,LARGE: Brand: MEDLINE

## (undated) DEVICE — 4-PORT MANIFOLD: Brand: NEPTUNE 2

## (undated) DEVICE — MEDI-VAC NON-CONDUCTIVE SUCTION TUBING 7MM X 6.1M (20 FT.) L: Brand: CARDINAL HEALTH

## (undated) DEVICE — ELECTRODE PT RET AD L9FT HI MOIST COND ADH HYDRGEL CORDED

## (undated) DEVICE — SPONGE GZ W4XL4IN COT 4 PLY WVN

## (undated) DEVICE — EMESIS BASIN: Brand: DEROYAL

## (undated) DEVICE — FORCEPS BX L240CM JAW DIA2.2MM RAD JAW 4 HOT DISP

## (undated) DEVICE — SOLUTION IV IRRIG WATER 1000ML POUR BRL 2F7114

## (undated) DEVICE — BRUSH PRESOAK CLEAN BACTERIOSTATIC DUAL

## (undated) DEVICE — ADAPTER LAB  15GA INTMED LUER STUB